# Patient Record
Sex: FEMALE | Race: OTHER | Employment: PART TIME | URBAN - METROPOLITAN AREA
[De-identification: names, ages, dates, MRNs, and addresses within clinical notes are randomized per-mention and may not be internally consistent; named-entity substitution may affect disease eponyms.]

---

## 2017-01-25 ENCOUNTER — OFFICE VISIT (OUTPATIENT)
Dept: INTERNAL MEDICINE CLINIC | Facility: HOSPITAL | Age: 32
End: 2017-01-25
Attending: EMERGENCY MEDICINE

## 2017-01-25 DIAGNOSIS — J11.1 INFLUENZA: Primary | ICD-10-CM

## 2017-01-25 LAB
FLUAV + FLUBV RNA SPEC NAA+PROBE: NEGATIVE
FLUAV + FLUBV RNA SPEC NAA+PROBE: NEGATIVE
FLUAV + FLUBV RNA SPEC NAA+PROBE: POSITIVE

## 2017-01-25 PROCEDURE — 87631 RESP VIRUS 3-5 TARGETS: CPT

## 2017-11-27 ENCOUNTER — OFFICE VISIT (OUTPATIENT)
Dept: INTERNAL MEDICINE CLINIC | Facility: CLINIC | Age: 32
End: 2017-11-27

## 2017-11-27 ENCOUNTER — TELEPHONE (OUTPATIENT)
Dept: OTHER | Age: 32
End: 2017-11-27

## 2017-11-27 VITALS
HEART RATE: 81 BPM | TEMPERATURE: 98 F | SYSTOLIC BLOOD PRESSURE: 134 MMHG | DIASTOLIC BLOOD PRESSURE: 88 MMHG | HEIGHT: 62 IN | BODY MASS INDEX: 37.73 KG/M2 | WEIGHT: 205 LBS

## 2017-11-27 DIAGNOSIS — Z00.00 ANNUAL PHYSICAL EXAM: Primary | ICD-10-CM

## 2017-11-27 PROCEDURE — 99385 PREV VISIT NEW AGE 18-39: CPT | Performed by: INTERNAL MEDICINE

## 2017-11-27 RX ORDER — MONTELUKAST SODIUM 4 MG/1
4 TABLET, CHEWABLE ORAL DAILY
Qty: 90 TABLET | Refills: 3 | Status: SHIPPED | OUTPATIENT
Start: 2017-11-27 | End: 2017-11-27 | Stop reason: DRUGHIGH

## 2017-11-27 RX ORDER — MONTELUKAST SODIUM 10 MG/1
10 TABLET ORAL DAILY
Qty: 90 TABLET | Refills: 1 | Status: SHIPPED | OUTPATIENT
Start: 2017-11-27 | End: 2018-05-26

## 2017-11-27 NOTE — TELEPHONE ENCOUNTER
Pharmacy called to verify the dose of the Montelukast Sodium 4 MG as the usual adult dose is 10 mg. Please advise.

## 2017-11-27 NOTE — PROGRESS NOTES
Georgina Grant is a 29year old female. Patient presents with:  Physical      HPI:   Here for physical.  Doing well, no complaints. Exercised 2 times week. Tries to eat healthy, but has room for improvement. She is on BCP.     Family history -2 maternal lesions. HEENT: AT/NC, ear canal clear bilaterally, tympanic membrane clear bilaterally, nasal mucosa normal, posterior pharynx normal.  Tonsils not enlarged. No exudates.   NECK: supple, no thyromegaly, no thyroid nodules, No palpable lymphadenopathy, no

## 2017-11-28 ENCOUNTER — LAB ENCOUNTER (OUTPATIENT)
Dept: LAB | Facility: HOSPITAL | Age: 32
End: 2017-11-28
Attending: INTERNAL MEDICINE
Payer: COMMERCIAL

## 2017-11-28 DIAGNOSIS — Z00.00 ANNUAL PHYSICAL EXAM: ICD-10-CM

## 2017-11-28 PROCEDURE — 84443 ASSAY THYROID STIM HORMONE: CPT

## 2017-11-28 PROCEDURE — 36415 COLL VENOUS BLD VENIPUNCTURE: CPT

## 2017-11-28 PROCEDURE — 81015 MICROSCOPIC EXAM OF URINE: CPT

## 2017-11-28 PROCEDURE — 80061 LIPID PANEL: CPT

## 2017-11-28 PROCEDURE — 80053 COMPREHEN METABOLIC PANEL: CPT

## 2017-11-28 PROCEDURE — 85025 COMPLETE CBC W/AUTO DIFF WBC: CPT

## 2017-11-29 ENCOUNTER — TELEPHONE (OUTPATIENT)
Dept: INTERNAL MEDICINE CLINIC | Facility: CLINIC | Age: 32
End: 2017-11-29

## 2017-11-29 RX ORDER — SULFAMETHOXAZOLE AND TRIMETHOPRIM 800; 160 MG/1; MG/1
1 TABLET ORAL 2 TIMES DAILY
Qty: 3 TABLET | Refills: 0 | Status: SHIPPED | OUTPATIENT
Start: 2017-11-29 | End: 2017-12-02

## 2017-11-30 NOTE — TELEPHONE ENCOUNTER
Order for Bactrim sent to the pharmacy. UA was abnormal with bacteriuria. please inform the patient to stay hydrated and complete the course of antibiotics.

## 2017-12-27 ENCOUNTER — TELEPHONE (OUTPATIENT)
Dept: INTERNAL MEDICINE CLINIC | Facility: CLINIC | Age: 32
End: 2017-12-27

## 2017-12-27 DIAGNOSIS — N30.00 ACUTE CYSTITIS WITHOUT HEMATURIA: Primary | ICD-10-CM

## 2017-12-27 NOTE — TELEPHONE ENCOUNTER
Pt was sent antibiotics to pharmacy and still has same symptoms and also has line of bruising down right thigh

## 2017-12-28 NOTE — TELEPHONE ENCOUNTER
Spoke with patient (verified name and ), reviewed information, patient verbalized understanding and agrees with plan. Pt states she is still having frequency, urgency, cloudy, malodorous urine, nausea, very mild dysuria. She denies hematuria.  She took

## 2017-12-28 NOTE — TELEPHONE ENCOUNTER
Attempted to call the patient, no answer. Order for u/a and culture signed. Will treat for uti based on the results. It is difficult to treat the rash on the thigh to be evaluated via telephone, needs OV. Please inform th patient.

## 2017-12-28 NOTE — TELEPHONE ENCOUNTER
Advised patient on Dr. Ezra Chou information and recommendations. Patient verbalized understanding and will get the urinalysis/culture done. 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  Patient asked if Dr. Selam Galloway could see her bennie

## 2017-12-29 ENCOUNTER — LAB ENCOUNTER (OUTPATIENT)
Dept: LAB | Facility: HOSPITAL | Age: 32
End: 2017-12-29
Attending: INTERNAL MEDICINE
Payer: COMMERCIAL

## 2017-12-29 DIAGNOSIS — N30.00 ACUTE CYSTITIS WITHOUT HEMATURIA: ICD-10-CM

## 2017-12-29 PROCEDURE — 85025 COMPLETE CBC W/AUTO DIFF WBC: CPT

## 2017-12-29 PROCEDURE — 36415 COLL VENOUS BLD VENIPUNCTURE: CPT

## 2017-12-29 PROCEDURE — 87086 URINE CULTURE/COLONY COUNT: CPT

## 2017-12-29 PROCEDURE — 81003 URINALYSIS AUTO W/O SCOPE: CPT

## 2017-12-29 NOTE — TELEPHONE ENCOUNTER
Informed the patient to follow-up with me next week the earliest possible date to assess the bruises. Also ordering CBC with platelets to evaluate for possible thrombocytopenia.

## 2018-11-21 ENCOUNTER — LAB ENCOUNTER (OUTPATIENT)
Dept: LAB | Facility: HOSPITAL | Age: 33
End: 2018-11-21
Attending: INTERNAL MEDICINE
Payer: COMMERCIAL

## 2018-11-21 ENCOUNTER — OFFICE VISIT (OUTPATIENT)
Dept: INTERNAL MEDICINE CLINIC | Facility: CLINIC | Age: 33
End: 2018-11-21
Payer: COMMERCIAL

## 2018-11-21 VITALS
DIASTOLIC BLOOD PRESSURE: 86 MMHG | TEMPERATURE: 99 F | HEART RATE: 93 BPM | SYSTOLIC BLOOD PRESSURE: 121 MMHG | HEIGHT: 62 IN | BODY MASS INDEX: 37.69 KG/M2 | WEIGHT: 204.81 LBS

## 2018-11-21 DIAGNOSIS — Z00.00 ANNUAL PHYSICAL EXAM: Primary | ICD-10-CM

## 2018-11-21 DIAGNOSIS — Z00.00 ANNUAL PHYSICAL EXAM: ICD-10-CM

## 2018-11-21 PROCEDURE — 36415 COLL VENOUS BLD VENIPUNCTURE: CPT

## 2018-11-21 PROCEDURE — 99395 PREV VISIT EST AGE 18-39: CPT | Performed by: INTERNAL MEDICINE

## 2018-11-21 PROCEDURE — 85025 COMPLETE CBC W/AUTO DIFF WBC: CPT

## 2018-11-21 PROCEDURE — 84443 ASSAY THYROID STIM HORMONE: CPT

## 2018-11-21 PROCEDURE — 80053 COMPREHEN METABOLIC PANEL: CPT

## 2018-11-21 PROCEDURE — 80061 LIPID PANEL: CPT

## 2018-11-21 RX ORDER — TRIAMCINOLONE ACETONIDE OINTMENT USP, 0.05% 0.5 MG/G
1 OINTMENT TOPICAL 2 TIMES DAILY
Qty: 17 G | Refills: 0 | Status: SHIPPED | OUTPATIENT
Start: 2018-11-21 | End: 2018-11-28

## 2018-11-21 NOTE — PROGRESS NOTES
Benji Mello is a 34year old female. Patient presents with:  Physical      HPI:     HPI  Patient is here for physical.  Doing well  Requests for refill on triamcinolone cream for eczema.  Usually clears within 2-3 days of steroid use.       Family hist (BP Location: Right arm, Patient Position: Sitting, Cuff Size: adult)   Pulse 93   Temp 98.6 °F (37 °C) (Oral)   Ht 5' 2\" (1.575 m)   Wt 204 lb 12.8 oz (92.9 kg)   BMI 37.46 kg/m²   Physical Exam   Constitutional: She is oriented to person, place, and ina Future    Other orders  -     Triamcinolone Acetonide 0.05 % External Ointment; Apply 1 Application topically 2 (two) times daily for 7 days.     Had flu shot this year  Recommended jean marie D3 supplements 1000IU atleast 3-4 times a week in winter months    Tda

## 2019-01-03 ENCOUNTER — OFFICE VISIT (OUTPATIENT)
Dept: INTERNAL MEDICINE CLINIC | Facility: CLINIC | Age: 34
End: 2019-01-03
Payer: COMMERCIAL

## 2019-01-03 VITALS
DIASTOLIC BLOOD PRESSURE: 83 MMHG | WEIGHT: 206.88 LBS | BODY MASS INDEX: 38.07 KG/M2 | SYSTOLIC BLOOD PRESSURE: 123 MMHG | HEART RATE: 80 BPM | TEMPERATURE: 98 F | HEIGHT: 62 IN

## 2019-01-03 DIAGNOSIS — J03.90 ACUTE TONSILLITIS, UNSPECIFIED ETIOLOGY: Primary | ICD-10-CM

## 2019-01-03 PROCEDURE — 99213 OFFICE O/P EST LOW 20 MIN: CPT | Performed by: INTERNAL MEDICINE

## 2019-01-03 PROCEDURE — 99212 OFFICE O/P EST SF 10 MIN: CPT | Performed by: INTERNAL MEDICINE

## 2019-01-03 RX ORDER — AMOXICILLIN 875 MG/1
875 TABLET, COATED ORAL 2 TIMES DAILY
Qty: 20 TABLET | Refills: 0 | Status: SHIPPED | OUTPATIENT
Start: 2019-01-03 | End: 2019-01-13

## 2019-01-03 NOTE — PROGRESS NOTES
Lakeisha Aguirre is a 34year old female. Patient presents with:  Sore Throat  Ear Pain      HPI:     HPI  Patient here with complaints of sore throat, ear pain, body aches and fever for 7 days. Has difficulty swallowing and the throat is sore.   History o does not have insomnia.           EXAM:   /83 (BP Location: Right arm, Patient Position: Sitting, Cuff Size: large)   Pulse 80   Temp 98.2 °F (36.8 °C) (Oral)   Ht 5' 2\" (1.575 m)   Wt 206 lb 14.4 oz (93.8 kg)   BMI 37.84 kg/m²   Physical Exam   Cons and agrees to the plan.               Stefano Buenrostro MD  1/3/2019  3:11 PM

## 2019-01-09 ENCOUNTER — PATIENT MESSAGE (OUTPATIENT)
Dept: INTERNAL MEDICINE CLINIC | Facility: CLINIC | Age: 34
End: 2019-01-09

## 2019-01-09 NOTE — TELEPHONE ENCOUNTER
From: Christina Hdez  To: Evelyn Parks MD  Sent: 1/9/2019 12:56 PM CST  Subject: Visit Follow-up Question    Hi Dr. Celia Sethi,    Today is day 7 for the Amoxicillin that I’ve been taking, and my tonsils continue to be very swollen and painful (which is also

## 2019-01-10 ENCOUNTER — OFFICE VISIT (OUTPATIENT)
Dept: INTERNAL MEDICINE CLINIC | Facility: CLINIC | Age: 34
End: 2019-01-10
Payer: COMMERCIAL

## 2019-01-10 ENCOUNTER — APPOINTMENT (OUTPATIENT)
Dept: LAB | Facility: HOSPITAL | Age: 34
End: 2019-01-10
Attending: INTERNAL MEDICINE
Payer: COMMERCIAL

## 2019-01-10 VITALS
HEART RATE: 67 BPM | BODY MASS INDEX: 19.54 KG/M2 | TEMPERATURE: 99 F | DIASTOLIC BLOOD PRESSURE: 84 MMHG | HEIGHT: 62 IN | SYSTOLIC BLOOD PRESSURE: 125 MMHG | WEIGHT: 106.19 LBS

## 2019-01-10 DIAGNOSIS — J02.9 PHARYNGITIS, UNSPECIFIED ETIOLOGY: Primary | ICD-10-CM

## 2019-01-10 DIAGNOSIS — J02.9 PHARYNGITIS, UNSPECIFIED ETIOLOGY: ICD-10-CM

## 2019-01-10 PROCEDURE — 99213 OFFICE O/P EST LOW 20 MIN: CPT | Performed by: INTERNAL MEDICINE

## 2019-01-10 PROCEDURE — 86308 HETEROPHILE ANTIBODY SCREEN: CPT

## 2019-01-10 PROCEDURE — 99212 OFFICE O/P EST SF 10 MIN: CPT | Performed by: INTERNAL MEDICINE

## 2019-01-10 PROCEDURE — 36415 COLL VENOUS BLD VENIPUNCTURE: CPT

## 2019-01-10 NOTE — PROGRESS NOTES
Brianna Land is a 34year old female. Patient presents with: Tonsillitis: f/u  Earache      HPI:     HPI  Patient is here with the complaints of persistent sore throat despite of 8 days of amoxicillin. She was last seen on 1/3/2019 for pharyngitis. myalgias. Negative for joint pain. Skin: Negative for rash. Neurological: Negative for dizziness and headaches. Endo/Heme/Allergies: Negative for environmental allergies. Psychiatric/Behavioral: The patient does not have insomnia.           EXAM: day.  Over-the-counter Tylenol/Advil for pain. Patient to contact me next 3-4 days if the pain and symptoms persist, will consider steroids. The patient indicates understanding of these issues and agrees to the plan.               Saintclair Fleeting, MD  1

## 2019-01-11 LAB — HETEROPH AB SER QL: NEGATIVE

## 2019-01-24 ENCOUNTER — OFFICE VISIT (OUTPATIENT)
Dept: INTERNAL MEDICINE CLINIC | Facility: CLINIC | Age: 34
End: 2019-01-24
Payer: COMMERCIAL

## 2019-01-24 ENCOUNTER — NURSE TRIAGE (OUTPATIENT)
Dept: INTERNAL MEDICINE CLINIC | Facility: CLINIC | Age: 34
End: 2019-01-24

## 2019-01-24 VITALS
OXYGEN SATURATION: 98 % | BODY MASS INDEX: 37.56 KG/M2 | HEART RATE: 84 BPM | WEIGHT: 204.13 LBS | DIASTOLIC BLOOD PRESSURE: 85 MMHG | HEIGHT: 62 IN | SYSTOLIC BLOOD PRESSURE: 138 MMHG | TEMPERATURE: 98 F

## 2019-01-24 DIAGNOSIS — J40 BRONCHITIS: Primary | ICD-10-CM

## 2019-01-24 DIAGNOSIS — R09.81 SINUS CONGESTION: ICD-10-CM

## 2019-01-24 PROCEDURE — 99213 OFFICE O/P EST LOW 20 MIN: CPT | Performed by: INTERNAL MEDICINE

## 2019-01-24 PROCEDURE — 99212 OFFICE O/P EST SF 10 MIN: CPT | Performed by: INTERNAL MEDICINE

## 2019-01-24 RX ORDER — METHYLPREDNISOLONE 4 MG/1
TABLET ORAL
Qty: 1 KIT | Refills: 0 | Status: SHIPPED | OUTPATIENT
Start: 2019-01-24 | End: 2019-12-02 | Stop reason: ALTCHOICE

## 2019-01-24 RX ORDER — FLUTICASONE PROPIONATE 50 MCG
2 SPRAY, SUSPENSION (ML) NASAL DAILY
Qty: 1 BOTTLE | Refills: 0 | Status: SHIPPED | OUTPATIENT
Start: 2019-01-24 | End: 2019-02-20

## 2019-01-24 RX ORDER — ALBUTEROL SULFATE 90 UG/1
2 AEROSOL, METERED RESPIRATORY (INHALATION) EVERY 6 HOURS PRN
Qty: 1 INHALER | Refills: 2 | Status: SHIPPED | OUTPATIENT
Start: 2019-01-24 | End: 2021-11-16

## 2019-01-24 NOTE — TELEPHONE ENCOUNTER
Action Requested: Summary for Provider     []  Critical Lab, Recommendations Needed  [] Need Additional Advice  [x]   FYI    []   Need Orders  [] Need Medications Sent to Pharmacy  []  Other     SUMMARY: Three weeks ago was treated with amoxicillin for \"s

## 2019-01-24 NOTE — PROGRESS NOTES
Adelina Denney is a 34year old female. Patient presents with:  Shortness Of Breath  Tonsillitis: f/u       HPI:     HPI  Patient is here with complaints of shortness of breath for last 3-4 days and for tonsillitis follow-up.   She was prescribed amoxicil Never Smoker      Smokeless tobacco: Never Used    Alcohol use: Yes    Drug use: Not on file       REVIEW OF SYSTEMS:   Review of Systems   Constitutional: Positive for malaise/fatigue. Negative for chills, fever and weight loss.    HENT: Positive for conge Neck: Neck supple. Cardiovascular: Normal rate, regular rhythm and normal heart sounds. Pulmonary/Chest: Effort normal and breath sounds normal. She has no wheezes. Abdominal: Soft.  Bowel sounds are normal.   Neurological: She is alert and oriented Each Nare route daily.  -     Spacer/Aero Chamber Mouthpiece Does not apply Misc; Use for inhalation  -     triamcinolone acetonide 0.1 % External Cream; Apply topically 2 (two) times daily as needed. Cream.  Do not use for extended periods of time.

## 2019-01-24 NOTE — TELEPHONE ENCOUNTER
Pt scheduled appt through [address] with following sx:    Visit Type: MYCHART EXAM (2964)      1/24/2019   11:10 AM  20 mins. Roberto Carlos Valdivia MD          [facility]      Patient Comments:   shortness of breath     Please advise.

## 2019-02-03 ENCOUNTER — PATIENT MESSAGE (OUTPATIENT)
Dept: INTERNAL MEDICINE CLINIC | Facility: CLINIC | Age: 34
End: 2019-02-03

## 2019-02-04 ENCOUNTER — TELEPHONE (OUTPATIENT)
Dept: OTHER | Age: 34
End: 2019-02-04

## 2019-02-04 RX ORDER — VALACYCLOVIR HYDROCHLORIDE 1 G/1
TABLET, FILM COATED ORAL
Qty: 10 TABLET | Refills: 0 | Status: SHIPPED | OUTPATIENT
Start: 2019-02-04 | End: 2019-12-02

## 2019-02-04 NOTE — TELEPHONE ENCOUNTER
From: Rory Gutierrez  To: Princess Daniela MD  Sent: 2/3/2019 2:49 PM CST  Subject: Visit Follow-up Question    Dr. Little Brightly- I feel a lot better since finishing the steroids.  I however tend to get terrible cold sores after colds, and randomly at least once a m

## 2019-02-04 NOTE — TELEPHONE ENCOUNTER
[facility]  [facility]    Regarding: Visit Follow-up Question  Contact: [phone]  ----- Message from Mychart Generic sent at 2/3/2019  2:49 PM CST -----    Dr. Mily Smith- I feel a lot better since finishing the steroids.  I however tend to get terrible c

## 2019-02-20 DIAGNOSIS — Z20.822 SUSPECTED COVID-19 VIRUS INFECTION: Primary | ICD-10-CM

## 2019-02-20 RX ORDER — FLUTICASONE PROPIONATE 50 MCG
SPRAY, SUSPENSION (ML) NASAL
Qty: 3 BOTTLE | Refills: 0 | Status: SHIPPED | OUTPATIENT
Start: 2019-02-20 | End: 2019-12-02 | Stop reason: ALTCHOICE

## 2019-08-05 ENCOUNTER — OFFICE VISIT (OUTPATIENT)
Dept: OBGYN CLINIC | Facility: CLINIC | Age: 34
End: 2019-08-05
Payer: COMMERCIAL

## 2019-08-05 VITALS
WEIGHT: 208 LBS | DIASTOLIC BLOOD PRESSURE: 89 MMHG | HEART RATE: 76 BPM | BODY MASS INDEX: 38 KG/M2 | SYSTOLIC BLOOD PRESSURE: 129 MMHG

## 2019-08-05 DIAGNOSIS — Z30.432 ENCOUNTER FOR REMOVAL OF INTRAUTERINE CONTRACEPTIVE DEVICE: ICD-10-CM

## 2019-08-05 DIAGNOSIS — Z11.3 SCREENING FOR STD (SEXUALLY TRANSMITTED DISEASE): ICD-10-CM

## 2019-08-05 DIAGNOSIS — Z20.2 STD EXPOSURE: ICD-10-CM

## 2019-08-05 DIAGNOSIS — Z01.419 WELL WOMAN EXAM WITH ROUTINE GYNECOLOGICAL EXAM: Primary | ICD-10-CM

## 2019-08-05 DIAGNOSIS — Z12.4 CERVICAL CANCER SCREENING: ICD-10-CM

## 2019-08-05 DIAGNOSIS — Z30.432 ENCOUNTER FOR IUD REMOVAL: ICD-10-CM

## 2019-08-05 LAB
CONTROL LINE PRESENT WITH A CLEAR BACKGROUND (YES/NO): YES YES/NO
KIT LOT #: NORMAL NUMERIC

## 2019-08-05 PROCEDURE — 81025 URINE PREGNANCY TEST: CPT | Performed by: CLINICAL NURSE SPECIALIST

## 2019-08-05 PROCEDURE — 99385 PREV VISIT NEW AGE 18-39: CPT | Performed by: CLINICAL NURSE SPECIALIST

## 2019-08-05 PROCEDURE — 58301 REMOVE INTRAUTERINE DEVICE: CPT | Performed by: CLINICAL NURSE SPECIALIST

## 2019-08-05 NOTE — PROGRESS NOTES
Lauryn Manrique is a 34year old female  No LMP recorded. (Menstrual status: IUD - Intrauterine Device). Patient presents with:  Gyn Exam: NP, Annual, possible IUD removal pt has Jannie  New pt. Last annual exam and pap was 2017.  Pap was normal. Rafael Naranjo Days per week: Not on file        Minutes per session: Not on file      Stress: Not on file    Relationships      Social connections:        Talks on phone: Not on file        Gets together: Not on file        Attends [employer] witness service: Not on file dysuria, incontinence, abnormal vaginal discharge, vaginal itching  Musculoskeletal:  denies back pain. Skin/Breast:  Denies any breast pain, lumps, or discharge. Neurological:  denies headaches, extremity weakness or numbness.   Psychiatric: denies depr ONLY    Screening for STD (sexually transmitted disease)  -     CHLAMYDIA/GONOCOCCUS, MODE; Future  -     TRICH VAG BY MODE;  Future  -     TRICH VAG BY MODE  -     CHLAMYDIA/GONOCOCCUS, MODE    Cervical cancer screening  -     THINPREP PAP SMEAR B; Future  -

## 2019-08-06 LAB
C TRACH DNA SPEC QL NAA+PROBE: NEGATIVE
HPV I/H RISK 1 DNA SPEC QL NAA+PROBE: NEGATIVE
N GONORRHOEA DNA SPEC QL NAA+PROBE: NEGATIVE

## 2019-08-07 LAB — T VAGINALIS RRNA SPEC QL NAA+PROBE: POSITIVE

## 2019-08-08 ENCOUNTER — APPOINTMENT (OUTPATIENT)
Dept: LAB | Facility: HOSPITAL | Age: 34
End: 2019-08-08
Attending: CLINICAL NURSE SPECIALIST
Payer: COMMERCIAL

## 2019-08-08 DIAGNOSIS — Z20.2 EXPOSURE TO STD: Primary | ICD-10-CM

## 2019-08-08 PROCEDURE — 87808 TRICHOMONAS ASSAY W/OPTIC: CPT

## 2019-08-08 PROCEDURE — 87205 SMEAR GRAM STAIN: CPT

## 2019-08-08 PROCEDURE — 87591 N.GONORRHOEAE DNA AMP PROB: CPT

## 2019-08-08 PROCEDURE — 87491 CHLMYD TRACH DNA AMP PROBE: CPT

## 2019-08-08 PROCEDURE — 87106 FUNGI IDENTIFICATION YEAST: CPT

## 2019-08-08 NOTE — PROGRESS NOTES
Spoke with pt regarding pap with + BV and + trich. Pt is shocked by this. Repeat culture done today to confirm results.        MAF

## 2019-08-08 NOTE — PROCEDURES
IUD Removal     Pregnancy Results: negative from urine test   Birth control method(s) used:  Jannie IUD   Consent signed. Procedure discussed with the patient in detail including indication, risks, benefits, alternatives and complications.     Pelvic Exam F

## 2019-08-09 ENCOUNTER — TELEPHONE (OUTPATIENT)
Dept: OBGYN CLINIC | Facility: CLINIC | Age: 34
End: 2019-08-09

## 2019-08-09 LAB
C TRACH DNA SPEC QL NAA+PROBE: NEGATIVE
N GONORRHOEA DNA SPEC QL NAA+PROBE: NEGATIVE

## 2019-08-09 RX ORDER — METRONIDAZOLE 500 MG/1
500 TABLET ORAL 2 TIMES DAILY
Qty: 14 TABLET | Refills: 0 | Status: SHIPPED | OUTPATIENT
Start: 2019-08-09 | End: 2019-08-16

## 2019-08-09 NOTE — TELEPHONE ENCOUNTER
Spoke with pt on telephone and aware repeat vaginal culture is negative for Trich and + for BV, so the + trich from the pap must have been a false +. Will send in Rx for Flagyl 500 mg po BID x 7 days. Pt denies any questions at this time.     MAF

## 2019-08-10 LAB
GENITAL VAGINOSIS SCREEN: POSITIVE
TRICHOMONAS SCREEN: NEGATIVE

## 2019-08-29 ENCOUNTER — TELEPHONE (OUTPATIENT)
Dept: OBGYN CLINIC | Facility: CLINIC | Age: 34
End: 2019-08-29

## 2019-08-29 NOTE — TELEPHONE ENCOUNTER
Pt was treated for BV by [facility]. She was given rx for Metronidazole and did not take the last three pills. She stated that she left it in a hotel.   She is still having symptoms of vag discharge that is a yellowish brown thick discharge, vaginal itching and

## 2019-09-09 RX ORDER — METRONIDAZOLE 7.5 MG/G
1 GEL VAGINAL NIGHTLY
Qty: 1 TUBE | Refills: 0 | Status: SHIPPED | OUTPATIENT
Start: 2019-09-09 | End: 2019-09-14

## 2019-09-09 NOTE — TELEPHONE ENCOUNTER
Pt was advised Rx Megtrogel Vaginal Gel will be sent to her pharmacy. If pt does not have relief she will need to be seen in the office for repeat cultures. Pt agrees with plan. Rx sent to pharmacy.

## 2019-12-02 ENCOUNTER — LAB ENCOUNTER (OUTPATIENT)
Dept: LAB | Age: 34
End: 2019-12-02
Attending: FAMILY MEDICINE
Payer: COMMERCIAL

## 2019-12-02 ENCOUNTER — OFFICE VISIT (OUTPATIENT)
Dept: FAMILY MEDICINE CLINIC | Facility: CLINIC | Age: 34
End: 2019-12-02
Payer: COMMERCIAL

## 2019-12-02 VITALS
SYSTOLIC BLOOD PRESSURE: 128 MMHG | BODY MASS INDEX: 37.81 KG/M2 | TEMPERATURE: 98 F | RESPIRATION RATE: 18 BRPM | HEART RATE: 82 BPM | WEIGHT: 213.38 LBS | DIASTOLIC BLOOD PRESSURE: 86 MMHG | HEIGHT: 63 IN

## 2019-12-02 DIAGNOSIS — Z00.00 ANNUAL PHYSICAL EXAM: Primary | ICD-10-CM

## 2019-12-02 DIAGNOSIS — Z00.00 ANNUAL PHYSICAL EXAM: ICD-10-CM

## 2019-12-02 PROBLEM — J40 BRONCHITIS: Status: RESOLVED | Noted: 2019-01-24 | Resolved: 2019-12-02

## 2019-12-02 PROCEDURE — 90471 IMMUNIZATION ADMIN: CPT | Performed by: FAMILY MEDICINE

## 2019-12-02 PROCEDURE — 90715 TDAP VACCINE 7 YRS/> IM: CPT | Performed by: FAMILY MEDICINE

## 2019-12-02 PROCEDURE — 84443 ASSAY THYROID STIM HORMONE: CPT

## 2019-12-02 PROCEDURE — 85025 COMPLETE CBC W/AUTO DIFF WBC: CPT

## 2019-12-02 PROCEDURE — 99385 PREV VISIT NEW AGE 18-39: CPT | Performed by: FAMILY MEDICINE

## 2019-12-02 PROCEDURE — 80053 COMPREHEN METABOLIC PANEL: CPT

## 2019-12-02 PROCEDURE — 80061 LIPID PANEL: CPT

## 2019-12-02 PROCEDURE — 36415 COLL VENOUS BLD VENIPUNCTURE: CPT

## 2019-12-02 RX ORDER — VALACYCLOVIR HYDROCHLORIDE 1 G/1
TABLET, FILM COATED ORAL
Qty: 10 TABLET | Refills: 3 | Status: SHIPPED | OUTPATIENT
Start: 2019-12-02 | End: 2021-05-24

## 2019-12-03 NOTE — PROGRESS NOTES
HPI:    Kristyn Tomlinson is a 30year old female presents to clinic as a new patient to establish care and for physical exam.  Denies acute concerns. Patient denies chronic medical conditions, takes no over-the-counter/prescription medications.   Normal a membrane, external ear and ear canal normal.   Nose: Nose normal.   Mouth/Throat: Uvula is midline, oropharynx is clear and moist and mucous membranes are normal.   Eyes: Pupils are equal, round, and reactive to light.  Conjunctivae and EOM are normal.   Ne

## 2019-12-06 ENCOUNTER — TELEPHONE (OUTPATIENT)
Dept: FAMILY MEDICINE CLINIC | Facility: CLINIC | Age: 34
End: 2019-12-06

## 2020-01-13 ENCOUNTER — OFFICE VISIT (OUTPATIENT)
Dept: FAMILY MEDICINE CLINIC | Facility: CLINIC | Age: 35
End: 2020-01-13
Payer: COMMERCIAL

## 2020-01-13 VITALS
HEIGHT: 63 IN | WEIGHT: 209 LBS | RESPIRATION RATE: 18 BRPM | TEMPERATURE: 98 F | HEART RATE: 108 BPM | SYSTOLIC BLOOD PRESSURE: 137 MMHG | DIASTOLIC BLOOD PRESSURE: 89 MMHG | BODY MASS INDEX: 37.03 KG/M2

## 2020-01-13 DIAGNOSIS — J02.0 STREP PHARYNGITIS: Primary | ICD-10-CM

## 2020-01-13 LAB
CONTROL LINE PRESENT WITH A CLEAR BACKGROUND (YES/NO): YES YES/NO
KIT LOT #: NORMAL NUMERIC

## 2020-01-13 PROCEDURE — 99213 OFFICE O/P EST LOW 20 MIN: CPT | Performed by: FAMILY MEDICINE

## 2020-01-13 PROCEDURE — 87880 STREP A ASSAY W/OPTIC: CPT | Performed by: FAMILY MEDICINE

## 2020-01-13 RX ORDER — PENICILLIN V POTASSIUM 500 MG/1
500 TABLET ORAL 3 TIMES DAILY
Qty: 30 TABLET | Refills: 0 | Status: SHIPPED | OUTPATIENT
Start: 2020-01-13 | End: 2020-01-23

## 2020-01-13 NOTE — PROGRESS NOTES
HPI:    Ulysses Perking is a 30year old female presents to clinic with a 2-day history of sore throat. Patient has been congested for the past week. Reports occasional headaches and some fatigue. Had some chills last week that resolved.   Denies fever Posterior oropharyngeal edema and posterior oropharyngeal erythema present. Eyes: Pupils are equal, round, and reactive to light. Conjunctivae and EOM are normal.   Neck: Normal range of motion. Neck supple. No thyromegaly present.    Cardiovascular: Norm

## 2020-04-10 ENCOUNTER — NURSE TRIAGE (OUTPATIENT)
Dept: OTHER | Age: 35
End: 2020-04-10

## 2020-04-10 RX ORDER — POLYMYXIN B SULFATE AND TRIMETHOPRIM 1; 10000 MG/ML; [USP'U]/ML
1 SOLUTION OPHTHALMIC EVERY 4 HOURS
Qty: 1 BOTTLE | Refills: 0 | Status: SHIPPED | OUTPATIENT
Start: 2020-04-10 | End: 2020-04-17

## 2020-04-10 NOTE — TELEPHONE ENCOUNTER
Called patient and reviewed provider's recommendations from below. Also reviewed medication directions with patient. She verbalized understanding.

## 2020-04-10 NOTE — TELEPHONE ENCOUNTER
Action Requested: Summary for Provider     []  Critical Lab, Recommendations Needed  [x] Need Additional Advice  []   FYI    []   Need Orders  [] Need Medications Sent to Pharmacy  []  Other     SUMMARY: Patient calling with complaint of right eye itching,

## 2020-04-10 NOTE — TELEPHONE ENCOUNTER
Polytrim drops to pharmacy. Please instruct patient to throw out all of her eye make-up that she is used in the past 48 hours, and frequent handwashing.

## 2020-07-16 DIAGNOSIS — Z78.9 PARTICIPANT IN HEALTH AND WELLNESS PLAN: Primary | ICD-10-CM

## 2020-07-28 ENCOUNTER — NURSE ONLY (OUTPATIENT)
Dept: LAB | Age: 35
End: 2020-07-28
Attending: PREVENTIVE MEDICINE

## 2020-07-28 DIAGNOSIS — Z78.9 PARTICIPANT IN HEALTH AND WELLNESS PLAN: ICD-10-CM

## 2020-07-28 PROCEDURE — 86769 SARS-COV-2 COVID-19 ANTIBODY: CPT

## 2020-07-29 LAB — SARS-COV-2 IGG SERPLBLD QL IA.RAPID: NEGATIVE

## 2020-09-21 ENCOUNTER — OFFICE VISIT (OUTPATIENT)
Dept: OTHER | Facility: HOSPITAL | Age: 35
End: 2020-09-21
Attending: ORTHOPAEDIC SURGERY

## 2020-09-21 ENCOUNTER — HOSPITAL ENCOUNTER (OUTPATIENT)
Dept: GENERAL RADIOLOGY | Facility: HOSPITAL | Age: 35
Discharge: HOME | End: 2020-09-21
Attending: ORTHOPAEDIC SURGERY
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
## 2020-09-21 PROCEDURE — 73130 X-RAY EXAM OF HAND: CPT | Performed by: ORTHOPAEDIC SURGERY

## 2020-09-28 ENCOUNTER — APPOINTMENT (OUTPATIENT)
Dept: OTHER | Facility: HOSPITAL | Age: 35
End: 2020-09-28
Attending: ORTHOPAEDIC SURGERY

## 2020-11-04 ENCOUNTER — TELEPHONE (OUTPATIENT)
Dept: INTERNAL MEDICINE CLINIC | Facility: HOSPITAL | Age: 35
End: 2020-11-04

## 2020-11-04 ENCOUNTER — LAB ENCOUNTER (OUTPATIENT)
Dept: LAB | Age: 35
End: 2020-11-04
Attending: PREVENTIVE MEDICINE
Payer: COMMERCIAL

## 2020-11-04 DIAGNOSIS — Z20.822 SUSPECTED COVID-19 VIRUS INFECTION: ICD-10-CM

## 2020-11-04 DIAGNOSIS — Z20.822 SUSPECTED COVID-19 VIRUS INFECTION: Primary | ICD-10-CM

## 2020-12-02 ENCOUNTER — NURSE ONLY (OUTPATIENT)
Dept: OBGYN CLINIC | Facility: CLINIC | Age: 35
End: 2020-12-02
Payer: COMMERCIAL

## 2020-12-02 DIAGNOSIS — Z34.01 ENCOUNTER FOR SUPERVISION OF NORMAL FIRST PREGNANCY IN FIRST TRIMESTER: Primary | ICD-10-CM

## 2020-12-02 RX ORDER — CHOLECALCIFEROL (VITAMIN D3) 25 MCG
1 TABLET,CHEWABLE ORAL DAILY
COMMUNITY
End: 2021-11-16 | Stop reason: ALTCHOICE

## 2020-12-02 NOTE — PROGRESS NOTES
Pt seen for OBN appt today with no complaints. Normal PN labs, qual, 1 hr gtt, and hep c ordered. Pt advised all labs must be completed and resulted prior to MD appt. NPN appt with MD scheduled on 12/8/2020 with CAP in [address] [place] [address]. Pt desires FTS.     Par No   Neural tube defect (Meningomyelocele, Spina bifida, or Anencephaly): No   Congenital heart defect: No   Down syndrome: Yes (Comment: pt's maternal uncle has Down Syndrome)   Aaron ([address] [place]): No   Canavan disease (A

## 2020-12-05 ENCOUNTER — LAB ENCOUNTER (OUTPATIENT)
Dept: LAB | Facility: HOSPITAL | Age: 35
End: 2020-12-05
Attending: OBSTETRICS & GYNECOLOGY
Payer: COMMERCIAL

## 2020-12-05 DIAGNOSIS — Z34.01 ENCOUNTER FOR SUPERVISION OF NORMAL FIRST PREGNANCY IN FIRST TRIMESTER: ICD-10-CM

## 2020-12-05 PROCEDURE — 86850 RBC ANTIBODY SCREEN: CPT

## 2020-12-05 PROCEDURE — 86780 TREPONEMA PALLIDUM: CPT

## 2020-12-05 PROCEDURE — 84703 CHORIONIC GONADOTROPIN ASSAY: CPT

## 2020-12-05 PROCEDURE — 87389 HIV-1 AG W/HIV-1&-2 AB AG IA: CPT

## 2020-12-05 PROCEDURE — 85025 COMPLETE CBC W/AUTO DIFF WBC: CPT

## 2020-12-05 PROCEDURE — 87340 HEPATITIS B SURFACE AG IA: CPT

## 2020-12-05 PROCEDURE — 86803 HEPATITIS C AB TEST: CPT

## 2020-12-05 PROCEDURE — 86762 RUBELLA ANTIBODY: CPT

## 2020-12-05 PROCEDURE — 86900 BLOOD TYPING SEROLOGIC ABO: CPT

## 2020-12-05 PROCEDURE — 87086 URINE CULTURE/COLONY COUNT: CPT

## 2020-12-05 PROCEDURE — 86901 BLOOD TYPING SEROLOGIC RH(D): CPT

## 2020-12-05 PROCEDURE — 36415 COLL VENOUS BLD VENIPUNCTURE: CPT

## 2020-12-07 ENCOUNTER — LAB ENCOUNTER (OUTPATIENT)
Dept: LAB | Facility: HOSPITAL | Age: 35
End: 2020-12-07
Attending: OBSTETRICS & GYNECOLOGY
Payer: COMMERCIAL

## 2020-12-07 DIAGNOSIS — Z34.01 ENCOUNTER FOR SUPERVISION OF NORMAL FIRST PREGNANCY IN FIRST TRIMESTER: ICD-10-CM

## 2020-12-07 PROCEDURE — 36415 COLL VENOUS BLD VENIPUNCTURE: CPT

## 2020-12-07 PROCEDURE — 82950 GLUCOSE TEST: CPT

## 2020-12-08 ENCOUNTER — HOSPITAL ENCOUNTER (OUTPATIENT)
Dept: ULTRASOUND IMAGING | Age: 35
Discharge: HOME | End: 2020-12-08
Attending: OBSTETRICS & GYNECOLOGY
Payer: COMMERCIAL

## 2020-12-08 ENCOUNTER — INITIAL PRENATAL (OUTPATIENT)
Dept: OBGYN CLINIC | Facility: CLINIC | Age: 35
End: 2020-12-08
Payer: COMMERCIAL

## 2020-12-08 VITALS
DIASTOLIC BLOOD PRESSURE: 84 MMHG | SYSTOLIC BLOOD PRESSURE: 131 MMHG | HEART RATE: 89 BPM | WEIGHT: 221.38 LBS | BODY MASS INDEX: 39 KG/M2

## 2020-12-08 DIAGNOSIS — Z34.91 PREGNANCY WITH UNCERTAIN DATES IN FIRST TRIMESTER: ICD-10-CM

## 2020-12-08 DIAGNOSIS — Z34.01 ENCOUNTER FOR SUPERVISION OF NORMAL FIRST PREGNANCY IN FIRST TRIMESTER: Primary | ICD-10-CM

## 2020-12-08 PROBLEM — N39.0 FREQUENT UTI: Status: ACTIVE | Noted: 2020-12-08

## 2020-12-08 PROCEDURE — 3075F SYST BP GE 130 - 139MM HG: CPT | Performed by: OBSTETRICS & GYNECOLOGY

## 2020-12-08 PROCEDURE — 76817 TRANSVAGINAL US OBSTETRIC: CPT | Performed by: OBSTETRICS & GYNECOLOGY

## 2020-12-08 PROCEDURE — 81002 URINALYSIS NONAUTO W/O SCOPE: CPT | Performed by: OBSTETRICS & GYNECOLOGY

## 2020-12-08 PROCEDURE — 3079F DIAST BP 80-89 MM HG: CPT | Performed by: OBSTETRICS & GYNECOLOGY

## 2020-12-08 NOTE — PROGRESS NOTES
Gest sac not well visualized on office US- sent for TVUS- suspect wrong dates- no menses in October but preg test neg x2 during that month.   Wants FTS, h/o freq UTI- d/w pt q trimester urine cxs, gc/chlamydia done

## 2020-12-09 ENCOUNTER — TELEPHONE (OUTPATIENT)
Dept: OBGYN CLINIC | Facility: CLINIC | Age: 35
End: 2020-12-09

## 2020-12-09 NOTE — TELEPHONE ENCOUNTER
Order placed, mychart sent, [facility] number provided     OK to add Level 2 per CAP and KIRSTY due to BMI

## 2021-01-08 ENCOUNTER — ROUTINE PRENATAL (OUTPATIENT)
Dept: OBGYN CLINIC | Facility: CLINIC | Age: 36
End: 2021-01-08
Payer: COMMERCIAL

## 2021-01-08 VITALS
WEIGHT: 225 LBS | HEART RATE: 92 BPM | DIASTOLIC BLOOD PRESSURE: 86 MMHG | BODY MASS INDEX: 40 KG/M2 | SYSTOLIC BLOOD PRESSURE: 130 MMHG

## 2021-01-08 DIAGNOSIS — Z34.92 ENCOUNTER FOR SUPERVISION OF NORMAL PREGNANCY IN SECOND TRIMESTER, UNSPECIFIED GRAVIDITY: Primary | ICD-10-CM

## 2021-01-08 LAB
APPEARANCE: CLEAR
MULTISTIX LOT#: NORMAL NUMERIC
PH, URINE: 7 (ref 4.5–8)
SPECIFIC GRAVITY: 1.01 (ref 1–1.03)
URINE-COLOR: YELLOW

## 2021-01-08 PROCEDURE — 81002 URINALYSIS NONAUTO W/O SCOPE: CPT | Performed by: OBSTETRICS & GYNECOLOGY

## 2021-01-08 PROCEDURE — 3075F SYST BP GE 130 - 139MM HG: CPT | Performed by: OBSTETRICS & GYNECOLOGY

## 2021-01-08 PROCEDURE — 3079F DIAST BP 80-89 MM HG: CPT | Performed by: OBSTETRICS & GYNECOLOGY

## 2021-01-14 ENCOUNTER — IMMUNIZATION (OUTPATIENT)
Dept: LAB | Facility: HOSPITAL | Age: 36
End: 2021-01-14
Attending: EMERGENCY MEDICINE
Payer: COMMERCIAL

## 2021-01-14 DIAGNOSIS — Z23 NEED FOR VACCINATION: Primary | ICD-10-CM

## 2021-01-14 PROCEDURE — 0011A SARSCOV2 VAC 100MCG/0.5ML IM: CPT

## 2021-01-18 ENCOUNTER — APPOINTMENT (OUTPATIENT)
Dept: ULTRASOUND IMAGING | Facility: HOSPITAL | Age: 36
End: 2021-01-18
Attending: NURSE PRACTITIONER
Payer: COMMERCIAL

## 2021-01-18 ENCOUNTER — TELEPHONE (OUTPATIENT)
Dept: OBGYN CLINIC | Facility: CLINIC | Age: 36
End: 2021-01-18

## 2021-01-18 ENCOUNTER — HOSPITAL ENCOUNTER (EMERGENCY)
Facility: HOSPITAL | Age: 36
Discharge: HOME | End: 2021-01-18
Payer: COMMERCIAL

## 2021-01-18 VITALS
SYSTOLIC BLOOD PRESSURE: 135 MMHG | BODY MASS INDEX: 41.41 KG/M2 | DIASTOLIC BLOOD PRESSURE: 85 MMHG | TEMPERATURE: 97 F | WEIGHT: 225 LBS | RESPIRATION RATE: 20 BRPM | OXYGEN SATURATION: 98 % | HEIGHT: 62 IN | HEART RATE: 72 BPM

## 2021-01-18 DIAGNOSIS — O46.90 VAGINAL BLEEDING DURING PREGNANCY: Primary | ICD-10-CM

## 2021-01-18 LAB
ANTIBODY SCREEN: NEGATIVE
B-HCG SERPL-ACNC: ABNORMAL MIU/ML
BASOPHILS # BLD AUTO: 0.04 X10(3) UL (ref 0–0.2)
BASOPHILS NFR BLD AUTO: 0.4 %
DEPRECATED RDW RBC AUTO: 38.2 FL (ref 35.1–46.3)
EOSINOPHIL # BLD AUTO: 0.13 X10(3) UL (ref 0–0.7)
EOSINOPHIL NFR BLD AUTO: 1.2 %
ERYTHROCYTE [DISTWIDTH] IN BLOOD BY AUTOMATED COUNT: 11.9 % (ref 11–15)
HCT VFR BLD AUTO: 38.9 %
HGB BLD-MCNC: 13.3 G/DL
IMM GRANULOCYTES # BLD AUTO: 0.04 X10(3) UL (ref 0–1)
IMM GRANULOCYTES NFR BLD: 0.4 %
LYMPHOCYTES # BLD AUTO: 2.6 X10(3) UL (ref 1–4)
LYMPHOCYTES NFR BLD AUTO: 23.2 %
MCH RBC QN AUTO: 30 PG (ref 26–34)
MCHC RBC AUTO-ENTMCNC: 34.2 G/DL (ref 31–37)
MCV RBC AUTO: 87.6 FL
MONOCYTES # BLD AUTO: 0.77 X10(3) UL (ref 0.1–1)
MONOCYTES NFR BLD AUTO: 6.9 %
NEUTROPHILS # BLD AUTO: 7.65 X10 (3) UL (ref 1.5–7.7)
NEUTROPHILS # BLD AUTO: 7.65 X10(3) UL (ref 1.5–7.7)
NEUTROPHILS NFR BLD AUTO: 67.9 %
PLATELET # BLD AUTO: 261 10(3)UL (ref 150–450)
RBC # BLD AUTO: 4.44 X10(6)UL
RH BLOOD TYPE: NEGATIVE
WBC # BLD AUTO: 11.2 X10(3) UL (ref 4–11)

## 2021-01-18 PROCEDURE — 85025 COMPLETE CBC W/AUTO DIFF WBC: CPT | Performed by: NURSE PRACTITIONER

## 2021-01-18 PROCEDURE — 96361 HYDRATE IV INFUSION ADD-ON: CPT

## 2021-01-18 PROCEDURE — 99284 EMERGENCY DEPT VISIT MOD MDM: CPT

## 2021-01-18 PROCEDURE — 86901 BLOOD TYPING SEROLOGIC RH(D): CPT | Performed by: NURSE PRACTITIONER

## 2021-01-18 PROCEDURE — 86850 RBC ANTIBODY SCREEN: CPT | Performed by: NURSE PRACTITIONER

## 2021-01-18 PROCEDURE — 76815 OB US LIMITED FETUS(S): CPT | Performed by: NURSE PRACTITIONER

## 2021-01-18 PROCEDURE — 86900 BLOOD TYPING SEROLOGIC ABO: CPT | Performed by: NURSE PRACTITIONER

## 2021-01-18 PROCEDURE — 96374 THER/PROPH/DIAG INJ IV PUSH: CPT

## 2021-01-18 PROCEDURE — 84702 CHORIONIC GONADOTROPIN TEST: CPT | Performed by: NURSE PRACTITIONER

## 2021-01-18 NOTE — TELEPHONE ENCOUNTER
13w0d states was laying down to take a nap and left a \"little gush\" 10 minutes ago. States went to the bathroom and noticed red spotting when wiping x3 times and on toilet bowl. Denies any cramping or pain. States IC last night. Denies any straining.  Pt Please follow up in 5-6 weeks. You are advised to contact us if your condition worsens. Look into a [employer] ankle brace at [email or web address]  Address: [address] [place] [address], [place], [facility] [address]  Phone: [phone]      Broken Lower Leg: Care Instructions  Your Care Instructions    Treatment for your broken leg will depend on how bad the break is. Your doctor may have put your lower leg in a splint or a cast to allow it to heal or keep it stable until you see another doctor. It may take weeks or months for your leg to heal. You can help it heal with some care at home. You heal best when you take good care of yourself. Eat a variety of healthy foods, and don't smoke. Follow-up care is a key part of your treatment and safety. Be sure to make and go to all appointments, and call your doctor if you are having problems. It's also a good idea to know your test results and keep a list of the medicines you take. How can you care for yourself at home? · Put ice or a cold pack on your lower leg for 10 to 20 minutes at a time. Try to do this every 1 to 2 hours for the next 3 days (when you are awake). Put a thin cloth between the ice and your cast or splint. Keep your cast or splint dry. · Follow the cast care instructions your doctor gives you. If you have a splint, do not take it off unless your doctor tells you to. · Be safe with medicines. Take pain medicines exactly as directed. ? If the doctor gave you a prescription medicine for pain, take it as prescribed. ? If you are not taking a prescription pain medicine, ask your doctor if you can take an over-the-counter medicine. · Do not put weight on your leg unless your doctor tells you to. Use crutches to walk. · Prop up your leg on pillows when you sit or lie down in the first few days after the injury. Keep your leg higher than the level of your heart. This will help reduce swelling.   · Follow instructions for exercises to keep your leg strong. · Wiggle your toes often to reduce swelling and stiffness. When should you call for help? Call 911 anytime you think you may need emergency care. For example, call if:    · You have chest pain, are short of breath, or you cough up blood.     · You are very sleepy and you have trouble waking up.    Call your doctor now or seek immediate medical care if:    · You have new or worse nausea or vomiting.     · You have new or worse pain.     · Your foot is cool or pale or changes color.     · You have tingling, weakness, or numbness in your toes.     · Your cast or splint feels too tight.     · You have signs of a blood clot in your leg (called a deep vein thrombosis), such as:  ? Pain in your calf, back of the knee, thigh, or groin. ? Redness or swelling in your leg.    Watch closely for changes in your health, and be sure to contact your doctor if:    · You have a problem with your splint or cast.     · You do not get better as expected. Where can you learn more? Go to http://angela-michelle.info/. Enter L198 in the search box to learn more about \"Broken Lower Leg: Care Instructions. \"  Current as of: September 20, 2018  Content Version: 11.9  © 9410-8559 [employer]. Care instructions adapted under license by [employer] (which disclaims liability or warranty for this information). If you have questions about a medical condition or this instruction, always ask your healthcare professional. [place] [address] any warranty or liability for your use of this information.

## 2021-01-18 NOTE — TELEPHONE ENCOUNTER
Pt now calling to report has experienced 3 episodes of bleeding like a period. States she soaked one pad in one hour. And about 1/2 pad the other two bleeding episodes. Denies any cramping or pain. Denies passing any clots. Denies SOB, or dizziness.  States

## 2021-01-19 NOTE — ED NOTES
Patient safe to DC home per NP. Able to dress self. DC teaching done, instructions reviewed with patient including when and how to follow up with healthcare providers and when to seek emergency care. The patient verbalizes understanding.   Patient ambulator

## 2021-01-19 NOTE — ED PROVIDER NOTES
Patient Seen in: [facility] AND [facility] Emergency Department      History   Patient presents with:  Pregnancy Issues    Stated Complaint: 15 w preg vag bleed    HPI/Subjective:   42-year-old female who is approximately 13 weeks gestation presenting with vagin (Within Days)   SpO2 98%   BMI 41.15 kg/m²         Physical Exam  Vitals signs and nursing note reviewed. Exam conducted with a chaperone present. Constitutional:       Appearance: Normal appearance. HENT:      Head: Normocephalic.       Right Ear: Exte Abnormality         Status                     ---------                               -----------         ------                     CBC W/ DIFFERENTIAL[[id number]]          Abnormal            Final result                 Please view res weeks, 1 day ñ 6 days 07/25/2021          CONCLUSION:  1. Single live intrauterine pregnancy with sonographic gestational age of 17 weeks, 0 days and sonographic estimated date of delivery of 07/26/2021 (established by the 12/08/2020 examination).  Measurem

## 2021-01-19 NOTE — ED INITIAL ASSESSMENT (HPI)
Pt , approximately 13 weeks pregnant. Pt c/o pink vaginal bleeding that is darkening into deep red vaginal bleeding. Denies abd cramping. Denies passing tissue.

## 2021-01-19 NOTE — TELEPHONE ENCOUNTER
Called pt for f/u. Pt stated that her bleeding is much less then yesterday and states that it is mild spotting (light pink in color) just with wiping. Pt stated she a minor cramping but it is not painful.  Pt has ER F/u appt with [facility] tomorrow AM. Pt confirm

## 2021-01-19 NOTE — ED NOTES
Patient presents to ER With complaints of vaginal bleeding. Patient admits she is about 13 weeks pregnant, and that the bleeding began earlier today. States it started out as like a pinkish tint, and has gradually gotten darker. Denies any cramping.

## 2021-01-20 ENCOUNTER — HOSPITAL ENCOUNTER (OUTPATIENT)
Dept: PERINATAL CARE | Facility: HOSPITAL | Age: 36
Discharge: HOME | End: 2021-01-20
Attending: OBSTETRICS & GYNECOLOGY
Payer: COMMERCIAL

## 2021-01-20 ENCOUNTER — ROUTINE PRENATAL (OUTPATIENT)
Dept: OBGYN CLINIC | Facility: CLINIC | Age: 36
End: 2021-01-20
Payer: COMMERCIAL

## 2021-01-20 VITALS
WEIGHT: 228 LBS | HEART RATE: 90 BPM | BODY MASS INDEX: 42 KG/M2 | SYSTOLIC BLOOD PRESSURE: 124 MMHG | DIASTOLIC BLOOD PRESSURE: 80 MMHG

## 2021-01-20 VITALS
HEART RATE: 88 BPM | SYSTOLIC BLOOD PRESSURE: 139 MMHG | WEIGHT: 225 LBS | DIASTOLIC BLOOD PRESSURE: 84 MMHG | BODY MASS INDEX: 41 KG/M2

## 2021-01-20 DIAGNOSIS — Z36.9 FIRST TRIMESTER SCREENING: ICD-10-CM

## 2021-01-20 DIAGNOSIS — E66.01 MORBID OBESITY (HCC): ICD-10-CM

## 2021-01-20 DIAGNOSIS — Z36.9 FIRST TRIMESTER SCREENING: Primary | ICD-10-CM

## 2021-01-20 DIAGNOSIS — Z34.92 ENCOUNTER FOR SUPERVISION OF NORMAL PREGNANCY IN SECOND TRIMESTER, UNSPECIFIED GRAVIDITY: Primary | ICD-10-CM

## 2021-01-20 PROBLEM — O26.899 RH NEGATIVE STATE IN ANTEPARTUM PERIOD (HCC): Status: ACTIVE | Noted: 2021-01-20

## 2021-01-20 PROBLEM — Z67.91 RH NEGATIVE STATE IN ANTEPARTUM PERIOD (HCC): Status: ACTIVE | Noted: 2021-01-20

## 2021-01-20 PROBLEM — O26.899 RH NEGATIVE STATE IN ANTEPARTUM PERIOD: Status: ACTIVE | Noted: 2021-01-20

## 2021-01-20 PROBLEM — Z67.91 RH NEGATIVE STATE IN ANTEPARTUM PERIOD: Status: ACTIVE | Noted: 2021-01-20

## 2021-01-20 LAB
APPEARANCE: CLEAR
MULTISTIX LOT#: NORMAL NUMERIC
PH, URINE: 6.5 (ref 4.5–8)
SPECIFIC GRAVITY: 1.02 (ref 1–1.03)
URINE-COLOR: YELLOW

## 2021-01-20 PROCEDURE — 81002 URINALYSIS NONAUTO W/O SCOPE: CPT | Performed by: OBSTETRICS & GYNECOLOGY

## 2021-01-20 PROCEDURE — 76813 OB US NUCHAL MEAS 1 GEST: CPT | Performed by: OBSTETRICS & GYNECOLOGY

## 2021-01-20 PROCEDURE — 3074F SYST BP LT 130 MM HG: CPT | Performed by: OBSTETRICS & GYNECOLOGY

## 2021-01-20 PROCEDURE — 3079F DIAST BP 80-89 MM HG: CPT | Performed by: OBSTETRICS & GYNECOLOGY

## 2021-01-20 PROCEDURE — 99243 OFF/OP CNSLTJ NEW/EST LOW 30: CPT | Performed by: OBSTETRICS & GYNECOLOGY

## 2021-01-20 NOTE — PROGRESS NOTES
Problem visit:   Seen in ED on 1/18 for bleeding. FHTs and IUP on US. Bleeding now light brown spotting. No issues. FTS scheduled for today.  Reviewed hydration and rest. Received Rhogam in ED  RTC as scheduled

## 2021-01-20 NOTE — PROGRESS NOTES
Reason for Consult:   Dear Dr. Kirstin Snyder,    Thank you for requesting ultrasound evaluation and maternal fetal medicine consultation on [facility]. As you are aware she is a 37year old female with a Cash pregnancy at 13w2d.   A maternal-fetal medi Abdomen:  soft, nontender, no contractions noted.            extremities:  nontender, no edema    DISCUSSION  During her visit we discussed and reviewed the following issues:    OBESITY:  Obesity during pregnancy is associated with numerous maternal and risk factors for preeclampsia.              Studies have found that the increased risk of  birth in obese gravidas is primarily associated with obesity-related medical and  complications, rather than an intrinsic predisposition to spontaneou rate.  We discussed options for additional testing should the screen return with an increased risk. We discussed amniocentesis and cell free DNA screening testing.   We discussed the detection rate for Down syndrome is ~90% and the false positive rate is 5 The nasal bone is present. Fetus: arms and legs seen suboptimally. Fetal head/skull and abdomen appeared normal. Stomach and bladder seen. Uterus and adnexa appeared normal    Uterus  Fibroid(s)   1. Size 75 mm x 70 mm x 63 mm. Mean 69.3 mm.  Vol 172.51

## 2021-01-25 ENCOUNTER — TELEPHONE (OUTPATIENT)
Dept: PERINATAL CARE | Facility: HOSPITAL | Age: 36
End: 2021-01-25

## 2021-01-25 NOTE — TELEPHONE ENCOUNTER
After some thought, patient called back. She desires NIPT screen due to abnormal hormone level that is low for BHCG.  We will order NIPT>

## 2021-01-25 NOTE — TELEPHONE ENCOUNTER
We discussed her first trimester screen results. Her screen is low risk for Down syndrome, trisomy 25, trisomy 15. The beta-hCG hormone level is low at the 2.5 percentile.   In this case, we recommend a level 2  anatomy ultrasound at 20 weeks which she is

## 2021-02-01 ENCOUNTER — TELEPHONE (OUTPATIENT)
Dept: OBGYN CLINIC | Facility: CLINIC | Age: 36
End: 2021-02-01

## 2021-02-01 ENCOUNTER — ROUTINE PRENATAL (OUTPATIENT)
Dept: OBGYN CLINIC | Facility: CLINIC | Age: 36
End: 2021-02-01
Payer: COMMERCIAL

## 2021-02-01 VITALS
HEART RATE: 98 BPM | DIASTOLIC BLOOD PRESSURE: 84 MMHG | BODY MASS INDEX: 42 KG/M2 | SYSTOLIC BLOOD PRESSURE: 139 MMHG | WEIGHT: 229.19 LBS

## 2021-02-01 DIAGNOSIS — Z34.02 ENCOUNTER FOR SUPERVISION OF NORMAL FIRST PREGNANCY IN SECOND TRIMESTER: Primary | ICD-10-CM

## 2021-02-01 LAB
APPEARANCE: CLEAR
MULTISTIX LOT#: 1044 NUMERIC
PH, URINE: 6.5 (ref 4.5–8)
SPECIFIC GRAVITY: 1.02 (ref 1–1.03)
URINE-COLOR: YELLOW
UROBILINOGEN,SEMI-QN: 0.2 MG/DL (ref 0–1.9)

## 2021-02-01 PROCEDURE — 81002 URINALYSIS NONAUTO W/O SCOPE: CPT | Performed by: OBSTETRICS & GYNECOLOGY

## 2021-02-01 PROCEDURE — 3075F SYST BP GE 130 - 139MM HG: CPT | Performed by: OBSTETRICS & GYNECOLOGY

## 2021-02-01 PROCEDURE — 3079F DIAST BP 80-89 MM HG: CPT | Performed by: OBSTETRICS & GYNECOLOGY

## 2021-02-01 NOTE — PROGRESS NOTES
Problem visit. Works in [employer]. Having spotting and cramping for 3 days. No recent intercourse. VE-- small amount of brown discharge. No active bleeding.    Keep scheduled PN visit

## 2021-02-01 NOTE — TELEPHONE ENCOUNTER
Pt states she has been cramping for the last few days and started spotting.  Please advise pt is 15 weeks

## 2021-02-01 NOTE — TELEPHONE ENCOUNTER
Pt states that she has been having cramping for the third day. She has brown spotting for three days. Pt has lower abd pelvic area cramping and lower back cramping. Pt states she did have a bm in the am before all the cramping and spotting started.     A

## 2021-02-02 ENCOUNTER — TELEPHONE (OUTPATIENT)
Dept: OBGYN CLINIC | Facility: CLINIC | Age: 36
End: 2021-02-02

## 2021-02-09 ENCOUNTER — ROUTINE PRENATAL (OUTPATIENT)
Dept: OBGYN CLINIC | Facility: CLINIC | Age: 36
End: 2021-02-09
Payer: COMMERCIAL

## 2021-02-09 VITALS
HEART RATE: 93 BPM | BODY MASS INDEX: 41 KG/M2 | SYSTOLIC BLOOD PRESSURE: 126 MMHG | DIASTOLIC BLOOD PRESSURE: 88 MMHG | WEIGHT: 225 LBS

## 2021-02-09 DIAGNOSIS — Z34.02 ENCOUNTER FOR SUPERVISION OF NORMAL FIRST PREGNANCY IN SECOND TRIMESTER: Primary | ICD-10-CM

## 2021-02-09 LAB
APPEARANCE: CLEAR
MULTISTIX LOT#: 5077 NUMERIC
URINE-COLOR: YELLOW

## 2021-02-09 PROCEDURE — 81002 URINALYSIS NONAUTO W/O SCOPE: CPT | Performed by: OBSTETRICS & GYNECOLOGY

## 2021-02-09 PROCEDURE — 3074F SYST BP LT 130 MM HG: CPT | Performed by: OBSTETRICS & GYNECOLOGY

## 2021-02-09 PROCEDURE — 3079F DIAST BP 80-89 MM HG: CPT | Performed by: OBSTETRICS & GYNECOLOGY

## 2021-02-09 NOTE — TELEPHONE ENCOUNTER
Dr. Gary Cross,    Pt is requesting intermittent FMLA due to recurrent vaginal bleeding. Pt is requesting 1-2 flare up per month until the end of of her pregnancy. Do you support? Thank you.   Kristy Michelle

## 2021-02-11 ENCOUNTER — IMMUNIZATION (OUTPATIENT)
Dept: LAB | Facility: HOSPITAL | Age: 36
End: 2021-02-11
Attending: PREVENTIVE MEDICINE
Payer: COMMERCIAL

## 2021-02-11 DIAGNOSIS — Z23 NEED FOR VACCINATION: Primary | ICD-10-CM

## 2021-02-11 PROCEDURE — 0012A SARSCOV2 VAC 100MCG/0.5ML IM: CPT

## 2021-02-15 PROBLEM — D25.9 LEIOMYOMA OF UTERUS AFFECTING PREGNANCY, ANTEPARTUM (HCC): Status: ACTIVE | Noted: 2021-02-15

## 2021-02-15 PROBLEM — O46.92 VAGINAL BLEEDING IN PREGNANCY, SECOND TRIMESTER: Status: ACTIVE | Noted: 2021-02-15

## 2021-02-15 PROBLEM — O34.10 LEIOMYOMA OF UTERUS AFFECTING PREGNANCY, ANTEPARTUM (HCC): Status: ACTIVE | Noted: 2021-02-15

## 2021-02-15 PROBLEM — O34.10 LEIOMYOMA OF UTERUS AFFECTING PREGNANCY, ANTEPARTUM: Status: ACTIVE | Noted: 2021-02-15

## 2021-02-15 PROBLEM — O46.92 VAGINAL BLEEDING IN PREGNANCY, SECOND TRIMESTER (HCC): Status: ACTIVE | Noted: 2021-02-15

## 2021-02-15 PROBLEM — D25.9 LEIOMYOMA OF UTERUS AFFECTING PREGNANCY, ANTEPARTUM: Status: ACTIVE | Noted: 2021-02-15

## 2021-03-08 ENCOUNTER — TELEPHONE (OUTPATIENT)
Dept: PERINATAL CARE | Facility: HOSPITAL | Age: 36
End: 2021-03-08

## 2021-03-10 ENCOUNTER — ROUTINE PRENATAL (OUTPATIENT)
Dept: OBGYN CLINIC | Facility: CLINIC | Age: 36
End: 2021-03-10
Payer: COMMERCIAL

## 2021-03-10 ENCOUNTER — HOSPITAL ENCOUNTER (OUTPATIENT)
Dept: PERINATAL CARE | Facility: HOSPITAL | Age: 36
Discharge: HOME | End: 2021-03-10
Attending: OBSTETRICS & GYNECOLOGY
Payer: COMMERCIAL

## 2021-03-10 VITALS
BODY MASS INDEX: 42 KG/M2 | DIASTOLIC BLOOD PRESSURE: 83 MMHG | WEIGHT: 232 LBS | SYSTOLIC BLOOD PRESSURE: 145 MMHG | HEART RATE: 111 BPM

## 2021-03-10 VITALS
SYSTOLIC BLOOD PRESSURE: 130 MMHG | DIASTOLIC BLOOD PRESSURE: 89 MMHG | BODY MASS INDEX: 42 KG/M2 | HEART RATE: 98 BPM | WEIGHT: 231.38 LBS

## 2021-03-10 DIAGNOSIS — O34.10 LEIOMYOMA OF UTERUS AFFECTING PREGNANCY, ANTEPARTUM: ICD-10-CM

## 2021-03-10 DIAGNOSIS — Z36.3 SCREENING, ANTENATAL, FOR MALFORMATION BY ULTRASOUND: ICD-10-CM

## 2021-03-10 DIAGNOSIS — D25.9 LEIOMYOMA OF UTERUS AFFECTING PREGNANCY, ANTEPARTUM: ICD-10-CM

## 2021-03-10 DIAGNOSIS — O99.212 OBESITY AFFECTING PREGNANCY IN SECOND TRIMESTER: Primary | ICD-10-CM

## 2021-03-10 DIAGNOSIS — O99.212 OBESITY AFFECTING PREGNANCY IN SECOND TRIMESTER: ICD-10-CM

## 2021-03-10 DIAGNOSIS — Z34.02 ENCOUNTER FOR SUPERVISION OF NORMAL FIRST PREGNANCY IN SECOND TRIMESTER: Primary | ICD-10-CM

## 2021-03-10 DIAGNOSIS — E66.01 MORBID OBESITY (HCC): ICD-10-CM

## 2021-03-10 LAB
APPEARANCE: CLEAR
MULTISTIX LOT#: 5077 NUMERIC
PH, URINE: 6.5 (ref 4.5–8)
SPECIFIC GRAVITY: 1.02 (ref 1–1.03)
URINE-COLOR: YELLOW
UROBILINOGEN,SEMI-QN: 0.2 MG/DL (ref 0–1.9)

## 2021-03-10 PROCEDURE — 81002 URINALYSIS NONAUTO W/O SCOPE: CPT | Performed by: OBSTETRICS & GYNECOLOGY

## 2021-03-10 PROCEDURE — 3079F DIAST BP 80-89 MM HG: CPT | Performed by: OBSTETRICS & GYNECOLOGY

## 2021-03-10 PROCEDURE — 99214 OFFICE O/P EST MOD 30 MIN: CPT | Performed by: OBSTETRICS & GYNECOLOGY

## 2021-03-10 PROCEDURE — 76811 OB US DETAILED SNGL FETUS: CPT | Performed by: OBSTETRICS & GYNECOLOGY

## 2021-03-10 PROCEDURE — 3075F SYST BP GE 130 - 139MM HG: CPT | Performed by: OBSTETRICS & GYNECOLOGY

## 2021-03-10 NOTE — PROGRESS NOTES
Reason for Consult:   Dear Dr. Melanie Ulloa,    Thank you for requesting ultrasound evaluation and maternal fetal medicine consultation on [facility]. As you are aware she is a 37year old female with a Cash pregnancy.   A maternal-fetal medicine cons Oriented. No acute distress          Abdomen:  soft, nontender, no contractions noted.            extremities:  nontender, no edema    DISCUSSION  During her visit we discussed and reviewed the following issues:      Low beta-hCG - Adverse outcomes have be x 30 mm. Mean 27.6 mm. Left lateral      Fetal Anatomy:  Visualized with normal appearance: head, face, spine, neck, skin, chest, abdominal wall, gastrointestinal tract, kidneys, bladder, extremities.    Brain: Visualized and normal appearances: brain paren

## 2021-04-07 ENCOUNTER — ROUTINE PRENATAL (OUTPATIENT)
Dept: OBGYN CLINIC | Facility: CLINIC | Age: 36
End: 2021-04-07
Payer: COMMERCIAL

## 2021-04-07 ENCOUNTER — LAB ENCOUNTER (OUTPATIENT)
Dept: LAB | Facility: HOSPITAL | Age: 36
End: 2021-04-07
Attending: OBSTETRICS & GYNECOLOGY
Payer: COMMERCIAL

## 2021-04-07 VITALS
SYSTOLIC BLOOD PRESSURE: 126 MMHG | WEIGHT: 236 LBS | BODY MASS INDEX: 43 KG/M2 | HEART RATE: 97 BPM | DIASTOLIC BLOOD PRESSURE: 85 MMHG

## 2021-04-07 DIAGNOSIS — R30.0 DYSURIA: ICD-10-CM

## 2021-04-07 DIAGNOSIS — Z34.92 ENCOUNTER FOR SUPERVISION OF NORMAL PREGNANCY IN SECOND TRIMESTER, UNSPECIFIED GRAVIDITY: Primary | ICD-10-CM

## 2021-04-07 PROCEDURE — 87086 URINE CULTURE/COLONY COUNT: CPT

## 2021-04-07 PROCEDURE — 87186 SC STD MICRODIL/AGAR DIL: CPT

## 2021-04-07 PROCEDURE — 3079F DIAST BP 80-89 MM HG: CPT | Performed by: OBSTETRICS & GYNECOLOGY

## 2021-04-07 PROCEDURE — 3074F SYST BP LT 130 MM HG: CPT | Performed by: OBSTETRICS & GYNECOLOGY

## 2021-04-07 PROCEDURE — 81002 URINALYSIS NONAUTO W/O SCOPE: CPT | Performed by: OBSTETRICS & GYNECOLOGY

## 2021-04-07 PROCEDURE — 87077 CULTURE AEROBIC IDENTIFY: CPT

## 2021-04-07 PROCEDURE — 81001 URINALYSIS AUTO W/SCOPE: CPT

## 2021-04-07 RX ORDER — VALACYCLOVIR HYDROCHLORIDE 1 G/1
TABLET, FILM COATED ORAL
Qty: 10 TABLET | Refills: 3 | OUTPATIENT
Start: 2021-04-07

## 2021-04-08 ENCOUNTER — TELEPHONE (OUTPATIENT)
Dept: OBGYN CLINIC | Facility: CLINIC | Age: 36
End: 2021-04-08

## 2021-04-08 RX ORDER — NITROFURANTOIN 25; 75 MG/1; MG/1
100 CAPSULE ORAL 2 TIMES DAILY
Qty: 14 CAPSULE | Refills: 0 | Status: SHIPPED | OUTPATIENT
Start: 2021-04-08 | End: 2021-04-15

## 2021-04-08 NOTE — TELEPHONE ENCOUNTER
Reviewed with NJG on call. Pt to start Macrobid BID x 7 days. Pt to call tomorrow to be sure it is the appropriate treatment. Pt verbalized understanding. Rx sent.

## 2021-04-08 NOTE — TELEPHONE ENCOUNTER
Pt is 24w3d, here at office asking for UA/Culture results. + for infection. Will review with NJG on call and notify pt once rx is sent. Pt works here. Will her here til 4.

## 2021-04-09 RX ORDER — CEPHALEXIN 500 MG/1
500 CAPSULE ORAL 2 TIMES DAILY
Qty: 14 CAPSULE | Refills: 0 | Status: SHIPPED | OUTPATIENT
Start: 2021-04-09 | End: 2021-06-02

## 2021-04-09 NOTE — TELEPHONE ENCOUNTER
Pt asking for urine cx results. Informed pt Macrobid is intermediate. Informed pt message will be sent to [address] [place] [address] (on-call). Pt verbalized understanding. NJG- should pt continue with Macrobid? Or switch Rx?

## 2021-04-20 ENCOUNTER — LAB ENCOUNTER (OUTPATIENT)
Dept: LAB | Facility: HOSPITAL | Age: 36
End: 2021-04-20
Attending: OBSTETRICS & GYNECOLOGY
Payer: COMMERCIAL

## 2021-04-20 DIAGNOSIS — Z34.92 ENCOUNTER FOR SUPERVISION OF NORMAL PREGNANCY IN SECOND TRIMESTER, UNSPECIFIED GRAVIDITY: ICD-10-CM

## 2021-04-20 PROCEDURE — 82950 GLUCOSE TEST: CPT

## 2021-04-20 PROCEDURE — 86901 BLOOD TYPING SEROLOGIC RH(D): CPT

## 2021-04-20 PROCEDURE — 85027 COMPLETE CBC AUTOMATED: CPT

## 2021-04-20 PROCEDURE — 86850 RBC ANTIBODY SCREEN: CPT

## 2021-04-20 PROCEDURE — 36415 COLL VENOUS BLD VENIPUNCTURE: CPT

## 2021-04-20 PROCEDURE — 86900 BLOOD TYPING SEROLOGIC ABO: CPT

## 2021-05-05 ENCOUNTER — HOSPITAL ENCOUNTER (OUTPATIENT)
Dept: PERINATAL CARE | Facility: HOSPITAL | Age: 36
Discharge: HOME | End: 2021-05-05
Attending: OBSTETRICS & GYNECOLOGY
Payer: COMMERCIAL

## 2021-05-05 ENCOUNTER — ROUTINE PRENATAL (OUTPATIENT)
Dept: OBGYN CLINIC | Facility: CLINIC | Age: 36
End: 2021-05-05
Payer: COMMERCIAL

## 2021-05-05 VITALS
BODY MASS INDEX: 45 KG/M2 | SYSTOLIC BLOOD PRESSURE: 151 MMHG | WEIGHT: 245 LBS | HEART RATE: 114 BPM | DIASTOLIC BLOOD PRESSURE: 82 MMHG

## 2021-05-05 VITALS
DIASTOLIC BLOOD PRESSURE: 84 MMHG | WEIGHT: 243 LBS | BODY MASS INDEX: 44 KG/M2 | HEART RATE: 97 BPM | SYSTOLIC BLOOD PRESSURE: 126 MMHG

## 2021-05-05 DIAGNOSIS — E66.01 MORBID OBESITY (HCC): ICD-10-CM

## 2021-05-05 DIAGNOSIS — O26.893 RH NEGATIVE STATUS DURING PREGNANCY IN THIRD TRIMESTER: ICD-10-CM

## 2021-05-05 DIAGNOSIS — O99.213 OBESITY AFFECTING PREGNANCY IN THIRD TRIMESTER: ICD-10-CM

## 2021-05-05 DIAGNOSIS — Z34.92 ENCOUNTER FOR SUPERVISION OF NORMAL PREGNANCY IN SECOND TRIMESTER, UNSPECIFIED GRAVIDITY: Primary | ICD-10-CM

## 2021-05-05 DIAGNOSIS — O99.213 OBESITY AFFECTING PREGNANCY IN THIRD TRIMESTER: Primary | ICD-10-CM

## 2021-05-05 DIAGNOSIS — D25.9 LEIOMYOMA OF UTERUS AFFECTING PREGNANCY, ANTEPARTUM: ICD-10-CM

## 2021-05-05 DIAGNOSIS — O34.10 LEIOMYOMA OF UTERUS AFFECTING PREGNANCY, ANTEPARTUM: ICD-10-CM

## 2021-05-05 DIAGNOSIS — Z67.91 RH NEGATIVE STATUS DURING PREGNANCY IN THIRD TRIMESTER: ICD-10-CM

## 2021-05-05 PROCEDURE — 76816 OB US FOLLOW-UP PER FETUS: CPT | Performed by: OBSTETRICS & GYNECOLOGY

## 2021-05-05 PROCEDURE — 81002 URINALYSIS NONAUTO W/O SCOPE: CPT | Performed by: CLINICAL NURSE SPECIALIST

## 2021-05-05 PROCEDURE — 3074F SYST BP LT 130 MM HG: CPT | Performed by: CLINICAL NURSE SPECIALIST

## 2021-05-05 PROCEDURE — 96372 THER/PROPH/DIAG INJ SC/IM: CPT | Performed by: CLINICAL NURSE SPECIALIST

## 2021-05-05 PROCEDURE — 3079F DIAST BP 80-89 MM HG: CPT | Performed by: CLINICAL NURSE SPECIALIST

## 2021-05-05 PROCEDURE — 99213 OFFICE O/P EST LOW 20 MIN: CPT | Performed by: OBSTETRICS & GYNECOLOGY

## 2021-05-05 NOTE — PROGRESS NOTES
Prenatal patient in today for Routine Rhogam injection. Patient is 28w2d today. Patient without complaints. Injection given Left Ventrogluteal . Patient tolerated well. Rhogam information sheet provided.  Patient instructed to contact office with any questi

## 2021-05-05 NOTE — PROGRESS NOTES
Rhogam given today. Fibroids stable on US this week. Next US with MFM at 34 weeks and aware she will start NST's at that time. Denies any vaginal bleeding or cramping.  RTO in 2 weeks

## 2021-05-05 NOTE — PROGRESS NOTES
Reason for Consult:   Dear Dr. Vikas Baker,    Thank you for requesting ultrasound evaluation and maternal fetal medicine consultation on [facility]. As you are aware she is a 37year old female with a Cash pregnancy.   A maternal-fetal medicine cons 151/82   Pulse 114   Wt 245 lb (111.1 kg)   LMP 09/15/2020 (Within Days)   BMI 44.81 kg/m²             Alert and Oriented. No acute distress          Abdomen:  soft, nontender, no contractions noted.            extremities:  nontender, no edema    DISCUSSI 98 mm x 92 mm. Right lateral  2. 54 mm x 21 mm x 52 mm. Anterior   3. 39 mm x 20 mm x 40 mm. Left lateral       IMPRESSION:   1. IUP @  28w2d   2. Scan consistent with dates  3. No fetal structural abnormalities seen   4. Morbid obesity BMI 41  5.   Fibro

## 2021-05-05 NOTE — PROGRESS NOTES
Pt for Growth US  Denies pregnancy complaints  States active fetus  Initial /82, P 114. Repeat /85, P 93. Dr Nithya Duran aware.

## 2021-05-14 ENCOUNTER — TELEPHONE (OUTPATIENT)
Dept: OBGYN CLINIC | Facility: CLINIC | Age: 36
End: 2021-05-14

## 2021-05-14 NOTE — TELEPHONE ENCOUNTER
Received breast pump order via fax. Order form completed and faxed back to [facility].

## 2021-05-19 ENCOUNTER — ROUTINE PRENATAL (OUTPATIENT)
Dept: OBGYN CLINIC | Facility: CLINIC | Age: 36
End: 2021-05-19
Payer: COMMERCIAL

## 2021-05-19 VITALS
SYSTOLIC BLOOD PRESSURE: 122 MMHG | HEART RATE: 92 BPM | DIASTOLIC BLOOD PRESSURE: 81 MMHG | WEIGHT: 244 LBS | BODY MASS INDEX: 45 KG/M2

## 2021-05-19 DIAGNOSIS — Z34.03 ENCOUNTER FOR SUPERVISION OF NORMAL FIRST PREGNANCY IN THIRD TRIMESTER: Primary | ICD-10-CM

## 2021-05-19 PROCEDURE — 3074F SYST BP LT 130 MM HG: CPT | Performed by: OBSTETRICS & GYNECOLOGY

## 2021-05-19 PROCEDURE — 3079F DIAST BP 80-89 MM HG: CPT | Performed by: OBSTETRICS & GYNECOLOGY

## 2021-05-19 PROCEDURE — 90715 TDAP VACCINE 7 YRS/> IM: CPT | Performed by: OBSTETRICS & GYNECOLOGY

## 2021-05-19 PROCEDURE — 90471 IMMUNIZATION ADMIN: CPT | Performed by: OBSTETRICS & GYNECOLOGY

## 2021-05-19 PROCEDURE — 81002 URINALYSIS NONAUTO W/O SCOPE: CPT | Performed by: OBSTETRICS & GYNECOLOGY

## 2021-05-19 NOTE — PROGRESS NOTES
Tdap vaccine administered to pts right deltoid. pt tolerated injection well. VIS info sheet regarding tdap given to pt.

## 2021-05-24 ENCOUNTER — TELEMEDICINE (OUTPATIENT)
Dept: FAMILY MEDICINE CLINIC | Facility: CLINIC | Age: 36
End: 2021-05-24

## 2021-05-24 DIAGNOSIS — B00.1 RECURRENT COLD SORES: Primary | ICD-10-CM

## 2021-05-24 DIAGNOSIS — B35.1 ONYCHOMYCOSIS: ICD-10-CM

## 2021-05-24 PROCEDURE — 99214 OFFICE O/P EST MOD 30 MIN: CPT | Performed by: FAMILY MEDICINE

## 2021-05-24 RX ORDER — VALACYCLOVIR HYDROCHLORIDE 1 G/1
TABLET, FILM COATED ORAL
Qty: 30 TABLET | Refills: 1 | Status: ON HOLD | OUTPATIENT
Start: 2021-05-24 | End: 2021-07-11

## 2021-05-24 NOTE — PROGRESS NOTES
HPI:    Marlys Erickson is a 37year old female presents for video visit with multiple concerns. Cold sores-recurrent issue for patient. Typically takes Valtrex. Outbreaks, needs refill.   Also, patient has noticed that her large toenail is thick/yello visit.  Physical Exam  Constitutional:       General: She is not in acute distress. Pulmonary:      Comments: Breathing appears nonlabored  Neurological:      Mental Status: She is alert.          ASSESSMENT/PLAN:   (B00.1) Recurrent cold sores  (primary e 5/24/2021  Meme Weaver MD

## 2021-06-02 ENCOUNTER — ROUTINE PRENATAL (OUTPATIENT)
Dept: OBGYN CLINIC | Facility: CLINIC | Age: 36
End: 2021-06-02
Payer: COMMERCIAL

## 2021-06-02 VITALS
SYSTOLIC BLOOD PRESSURE: 117 MMHG | WEIGHT: 249 LBS | HEART RATE: 97 BPM | BODY MASS INDEX: 46 KG/M2 | DIASTOLIC BLOOD PRESSURE: 79 MMHG

## 2021-06-02 DIAGNOSIS — O99.210 OBESITY IN PREGNANCY: ICD-10-CM

## 2021-06-02 DIAGNOSIS — Z34.93 ENCOUNTER FOR SUPERVISION OF NORMAL PREGNANCY IN THIRD TRIMESTER, UNSPECIFIED GRAVIDITY: Primary | ICD-10-CM

## 2021-06-02 PROCEDURE — 3078F DIAST BP <80 MM HG: CPT | Performed by: OBSTETRICS & GYNECOLOGY

## 2021-06-02 PROCEDURE — 81002 URINALYSIS NONAUTO W/O SCOPE: CPT | Performed by: OBSTETRICS & GYNECOLOGY

## 2021-06-02 PROCEDURE — 3074F SYST BP LT 130 MM HG: CPT | Performed by: OBSTETRICS & GYNECOLOGY

## 2021-06-16 ENCOUNTER — HOSPITAL ENCOUNTER (OUTPATIENT)
Dept: PERINATAL CARE | Facility: HOSPITAL | Age: 36
Discharge: HOME | End: 2021-06-16
Attending: OBSTETRICS & GYNECOLOGY
Payer: COMMERCIAL

## 2021-06-16 ENCOUNTER — ROUTINE PRENATAL (OUTPATIENT)
Dept: OBGYN CLINIC | Facility: CLINIC | Age: 36
End: 2021-06-16
Payer: COMMERCIAL

## 2021-06-16 VITALS
SYSTOLIC BLOOD PRESSURE: 127 MMHG | HEART RATE: 86 BPM | WEIGHT: 254.81 LBS | BODY MASS INDEX: 47 KG/M2 | DIASTOLIC BLOOD PRESSURE: 85 MMHG

## 2021-06-16 VITALS
HEART RATE: 88 BPM | DIASTOLIC BLOOD PRESSURE: 93 MMHG | WEIGHT: 255 LBS | SYSTOLIC BLOOD PRESSURE: 135 MMHG | BODY MASS INDEX: 47 KG/M2

## 2021-06-16 DIAGNOSIS — O34.10 LEIOMYOMA OF UTERUS AFFECTING PREGNANCY, ANTEPARTUM: ICD-10-CM

## 2021-06-16 DIAGNOSIS — O99.213 OBESITY AFFECTING PREGNANCY IN THIRD TRIMESTER: ICD-10-CM

## 2021-06-16 DIAGNOSIS — D25.9 LEIOMYOMA OF UTERUS AFFECTING PREGNANCY, ANTEPARTUM: ICD-10-CM

## 2021-06-16 DIAGNOSIS — Z34.91 ENCOUNTER FOR SUPERVISION OF NORMAL PREGNANCY IN FIRST TRIMESTER, UNSPECIFIED GRAVIDITY: Primary | ICD-10-CM

## 2021-06-16 DIAGNOSIS — O99.213 OBESITY AFFECTING PREGNANCY IN THIRD TRIMESTER: Primary | ICD-10-CM

## 2021-06-16 PROCEDURE — 99213 OFFICE O/P EST LOW 20 MIN: CPT | Performed by: OBSTETRICS & GYNECOLOGY

## 2021-06-16 PROCEDURE — 76819 FETAL BIOPHYS PROFIL W/O NST: CPT | Performed by: OBSTETRICS & GYNECOLOGY

## 2021-06-16 PROCEDURE — 76816 OB US FOLLOW-UP PER FETUS: CPT | Performed by: OBSTETRICS & GYNECOLOGY

## 2021-06-16 PROCEDURE — 81002 URINALYSIS NONAUTO W/O SCOPE: CPT | Performed by: OBSTETRICS & GYNECOLOGY

## 2021-06-16 PROCEDURE — 3074F SYST BP LT 130 MM HG: CPT | Performed by: OBSTETRICS & GYNECOLOGY

## 2021-06-16 PROCEDURE — 3079F DIAST BP 80-89 MM HG: CPT | Performed by: OBSTETRICS & GYNECOLOGY

## 2021-06-16 NOTE — PROGRESS NOTES
Pt for Growth US  Denies pregnancy complaints  States active fetus  Initial /93, P 88.    Repeat /81, P 82

## 2021-06-16 NOTE — PROGRESS NOTES
Reason for Consult:   Dear Dr. Melanie Ulloa,    Thank you for requesting ultrasound evaluation and maternal fetal medicine consultation on [facility]. As you are aware she is a 37year old female with a Cash pregnancy.   A maternal-fetal medicine cons Oriented. No acute distress          Abdomen:  soft, nontender, no contractions noted.            extremities:  nontender, no edema    DISCUSSION  During her visit we discussed and reviewed the following issues:      Low beta-hCG - Adverse outcomes have be lateral         38 x 20 x 44 mm      IMPRESSION:   1. IUP @  34w2d   2. Scan consistent with dates  3. No fetal structural abnormalities seen   4. Morbid obesity BMI 41  5. Fibroids  6. Low hCG on First trimester screen  RECOMMENDATIONS:   1.   Weekly NS

## 2021-06-16 NOTE — PROGRESS NOTES
No issues. MFM growth today. Needs to schedule NSTs. Has 3T labs and I added urine culture due to recurrent UTIs. RTC 2 wks .

## 2021-06-28 ENCOUNTER — NST DOCUMENTATION (OUTPATIENT)
Dept: OBGYN CLINIC | Facility: CLINIC | Age: 36
End: 2021-06-28

## 2021-06-28 ENCOUNTER — HOSPITAL ENCOUNTER (OUTPATIENT)
Facility: HOSPITAL | Age: 36
Discharge: HOME | End: 2021-06-28
Attending: OBSTETRICS & GYNECOLOGY | Admitting: OBSTETRICS & GYNECOLOGY
Payer: COMMERCIAL

## 2021-06-28 ENCOUNTER — APPOINTMENT (OUTPATIENT)
Dept: OBGYN CLINIC | Facility: HOSPITAL | Age: 36
End: 2021-06-28
Payer: COMMERCIAL

## 2021-06-28 VITALS — HEART RATE: 74 BPM | DIASTOLIC BLOOD PRESSURE: 53 MMHG | SYSTOLIC BLOOD PRESSURE: 133 MMHG

## 2021-06-28 PROCEDURE — 59025 FETAL NON-STRESS TEST: CPT

## 2021-06-28 PROCEDURE — 59025 FETAL NON-STRESS TEST: CPT | Performed by: OBSTETRICS & GYNECOLOGY

## 2021-06-29 ENCOUNTER — LAB ENCOUNTER (OUTPATIENT)
Dept: LAB | Facility: HOSPITAL | Age: 36
End: 2021-06-29
Attending: OBSTETRICS & GYNECOLOGY
Payer: COMMERCIAL

## 2021-06-29 DIAGNOSIS — Z34.91 ENCOUNTER FOR SUPERVISION OF NORMAL PREGNANCY IN FIRST TRIMESTER, UNSPECIFIED GRAVIDITY: ICD-10-CM

## 2021-06-29 LAB
DEPRECATED RDW RBC AUTO: 42.2 FL (ref 35.1–46.3)
ERYTHROCYTE [DISTWIDTH] IN BLOOD BY AUTOMATED COUNT: 13.1 % (ref 11–15)
HCT VFR BLD AUTO: 36.7 %
HGB BLD-MCNC: 12 G/DL
MCH RBC QN AUTO: 29.1 PG (ref 26–34)
MCHC RBC AUTO-ENTMCNC: 32.7 G/DL (ref 31–37)
MCV RBC AUTO: 88.9 FL
PLATELET # BLD AUTO: 225 10(3)UL (ref 150–450)
RBC # BLD AUTO: 4.13 X10(6)UL
WBC # BLD AUTO: 10.8 X10(3) UL (ref 4–11)

## 2021-06-29 PROCEDURE — 86780 TREPONEMA PALLIDUM: CPT

## 2021-06-29 PROCEDURE — 85027 COMPLETE CBC AUTOMATED: CPT

## 2021-06-29 PROCEDURE — 87389 HIV-1 AG W/HIV-1&-2 AB AG IA: CPT

## 2021-06-29 PROCEDURE — 36415 COLL VENOUS BLD VENIPUNCTURE: CPT

## 2021-06-29 PROCEDURE — 87086 URINE CULTURE/COLONY COUNT: CPT

## 2021-06-29 NOTE — NST
Nonstress Test   Patient: Dyllan Amin    Gestation: 36w0d    Diagnosis from order: Obesity in pregnancy       NST:         NST DOCUMENTATION 6/28/2021   Variability 6-25 BPM   Accelerations Yes   Decelerations None   Baseline 135   Uterine Irritability

## 2021-06-30 ENCOUNTER — ROUTINE PRENATAL (OUTPATIENT)
Dept: OBGYN CLINIC | Facility: CLINIC | Age: 36
End: 2021-06-30
Payer: COMMERCIAL

## 2021-06-30 ENCOUNTER — HOSPITAL ENCOUNTER (OUTPATIENT)
Facility: HOSPITAL | Age: 36
Setting detail: OBSERVATION
Discharge: HOME | End: 2021-06-30
Attending: OBSTETRICS & GYNECOLOGY | Admitting: OBSTETRICS & GYNECOLOGY
Payer: COMMERCIAL

## 2021-06-30 ENCOUNTER — TELEPHONE (OUTPATIENT)
Dept: OBGYN CLINIC | Facility: CLINIC | Age: 36
End: 2021-06-30

## 2021-06-30 VITALS — DIASTOLIC BLOOD PRESSURE: 78 MMHG | SYSTOLIC BLOOD PRESSURE: 136 MMHG | HEART RATE: 65 BPM | RESPIRATION RATE: 16 BRPM

## 2021-06-30 VITALS
HEART RATE: 84 BPM | WEIGHT: 257 LBS | DIASTOLIC BLOOD PRESSURE: 89 MMHG | SYSTOLIC BLOOD PRESSURE: 137 MMHG | BODY MASS INDEX: 47 KG/M2

## 2021-06-30 DIAGNOSIS — Z34.93 ENCOUNTER FOR SUPERVISION OF NORMAL PREGNANCY IN THIRD TRIMESTER, UNSPECIFIED GRAVIDITY: Primary | ICD-10-CM

## 2021-06-30 PROBLEM — R03.0 ELEVATED BP WITHOUT DIAGNOSIS OF HYPERTENSION: Status: ACTIVE | Noted: 2021-06-30

## 2021-06-30 LAB
ALBUMIN SERPL-MCNC: 2.7 G/DL (ref 3.4–5)
ALBUMIN/GLOB SERPL: 0.7 {RATIO} (ref 1–2)
ALP LIVER SERPL-CCNC: 95 U/L
ALT SERPL-CCNC: 16 U/L
ANION GAP SERPL CALC-SCNC: 9 MMOL/L (ref 0–18)
AST SERPL-CCNC: 13 U/L (ref 15–37)
BASOPHILS # BLD AUTO: 0.03 X10(3) UL (ref 0–0.2)
BASOPHILS NFR BLD AUTO: 0.3 %
BILIRUB SERPL-MCNC: 0.3 MG/DL (ref 0.1–2)
BUN BLD-MCNC: 8 MG/DL (ref 7–18)
BUN/CREAT SERPL: 18.6 (ref 10–20)
CALCIUM BLD-MCNC: 9 MG/DL (ref 8.5–10.1)
CHLORIDE SERPL-SCNC: 107 MMOL/L (ref 98–112)
CO2 SERPL-SCNC: 21 MMOL/L (ref 21–32)
CREAT BLD-MCNC: 0.43 MG/DL
CREAT UR-SCNC: 117 MG/DL
DEPRECATED RDW RBC AUTO: 42.5 FL (ref 35.1–46.3)
EOSINOPHIL # BLD AUTO: 0.08 X10(3) UL (ref 0–0.7)
EOSINOPHIL NFR BLD AUTO: 0.8 %
ERYTHROCYTE [DISTWIDTH] IN BLOOD BY AUTOMATED COUNT: 13.2 % (ref 11–15)
GLOBULIN PLAS-MCNC: 4.1 G/DL (ref 2.8–4.4)
GLUCOSE BLD-MCNC: 80 MG/DL (ref 70–99)
HCT VFR BLD AUTO: 37.3 %
HGB BLD-MCNC: 12.4 G/DL
IMM GRANULOCYTES # BLD AUTO: 0.05 X10(3) UL (ref 0–1)
IMM GRANULOCYTES NFR BLD: 0.5 %
LYMPHOCYTES # BLD AUTO: 1.33 X10(3) UL (ref 1–4)
LYMPHOCYTES NFR BLD AUTO: 13.9 %
M PROTEIN MFR SERPL ELPH: 6.8 G/DL (ref 6.4–8.2)
MCH RBC QN AUTO: 29.5 PG (ref 26–34)
MCHC RBC AUTO-ENTMCNC: 33.2 G/DL (ref 31–37)
MCV RBC AUTO: 88.6 FL
MONOCYTES # BLD AUTO: 0.68 X10(3) UL (ref 0.1–1)
MONOCYTES NFR BLD AUTO: 7.1 %
NEUTROPHILS # BLD AUTO: 7.4 X10 (3) UL (ref 1.5–7.7)
NEUTROPHILS # BLD AUTO: 7.4 X10(3) UL (ref 1.5–7.7)
NEUTROPHILS NFR BLD AUTO: 77.4 %
OSMOLALITY SERPL CALC.SUM OF ELEC: 281 MOSM/KG (ref 275–295)
PLATELET # BLD AUTO: 214 10(3)UL (ref 150–450)
POTASSIUM SERPL-SCNC: 4.2 MMOL/L (ref 3.5–5.1)
PROT UR-MCNC: 36.7 MG/DL
PROT/CREAT UR-RTO: 0.31
RBC # BLD AUTO: 4.21 X10(6)UL
SODIUM SERPL-SCNC: 137 MMOL/L (ref 136–145)
T PALLIDUM AB SER QL: NEGATIVE
WBC # BLD AUTO: 9.6 X10(3) UL (ref 4–11)

## 2021-06-30 PROCEDURE — 3075F SYST BP GE 130 - 139MM HG: CPT | Performed by: OBSTETRICS & GYNECOLOGY

## 2021-06-30 PROCEDURE — 84156 ASSAY OF PROTEIN URINE: CPT | Performed by: OBSTETRICS & GYNECOLOGY

## 2021-06-30 PROCEDURE — 80053 COMPREHEN METABOLIC PANEL: CPT | Performed by: OBSTETRICS & GYNECOLOGY

## 2021-06-30 PROCEDURE — 3079F DIAST BP 80-89 MM HG: CPT | Performed by: OBSTETRICS & GYNECOLOGY

## 2021-06-30 PROCEDURE — 81002 URINALYSIS NONAUTO W/O SCOPE: CPT | Performed by: OBSTETRICS & GYNECOLOGY

## 2021-06-30 PROCEDURE — 59025 FETAL NON-STRESS TEST: CPT

## 2021-06-30 PROCEDURE — 99213 OFFICE O/P EST LOW 20 MIN: CPT

## 2021-06-30 PROCEDURE — 82570 ASSAY OF URINE CREATININE: CPT | Performed by: OBSTETRICS & GYNECOLOGY

## 2021-06-30 PROCEDURE — 85025 COMPLETE CBC W/AUTO DIFF WBC: CPT | Performed by: OBSTETRICS & GYNECOLOGY

## 2021-06-30 PROCEDURE — 36415 COLL VENOUS BLD VENIPUNCTURE: CPT

## 2021-06-30 PROCEDURE — 59025 FETAL NON-STRESS TEST: CPT | Performed by: OBSTETRICS & GYNECOLOGY

## 2021-06-30 NOTE — PROGRESS NOTES
Pt is a 37year old female admitted to TR2/TR2-A. Patient presents with:  R/o Pih: pt. sent from the office to r/o Preeclampsia. Labwork ordered  Non-stress Test     Pt is  36w2d intra-uterine pregnancy. History obtained, pt.  Oriented to room,

## 2021-06-30 NOTE — TELEPHONE ENCOUNTER
Dr. Latonia Moya,     Please sign off on 2 forms:  **FMLA and short term disability**    -Highlight the patient and hit \"Chart\" button.   -In Chart Review, w/in the Encounter tab - click 1 time on the Telephone call encounter for 6/30/21 Scroll down the tele

## 2021-06-30 NOTE — PROGRESS NOTES
No issues. Swelling worsening. No preE symptoms otherwise. Had elevated BP with MFM growth and borderline today with 2+ protein on Udip. Missed recent NST so will send to [facility] for NST, labs, BP monitoring. GBS collected. RTC 1 wk.

## 2021-07-01 ENCOUNTER — LAB ENCOUNTER (OUTPATIENT)
Dept: LAB | Facility: HOSPITAL | Age: 36
End: 2021-07-01
Attending: OBSTETRICS & GYNECOLOGY
Payer: COMMERCIAL

## 2021-07-01 ENCOUNTER — TELEPHONE (OUTPATIENT)
Dept: OBGYN CLINIC | Facility: CLINIC | Age: 36
End: 2021-07-01

## 2021-07-01 DIAGNOSIS — Z34.93 ENCOUNTER FOR SUPERVISION OF NORMAL PREGNANCY IN THIRD TRIMESTER: Primary | ICD-10-CM

## 2021-07-01 PROBLEM — O12.13 PROTEINURIA AFFECTING PREGNANCY IN THIRD TRIMESTER (HCC): Status: ACTIVE | Noted: 2021-07-01

## 2021-07-01 PROBLEM — O12.13 PROTEINURIA AFFECTING PREGNANCY IN THIRD TRIMESTER: Status: ACTIVE | Noted: 2021-07-01

## 2021-07-01 LAB
CREAT UR-SCNC: 1.56 G/24 HR (ref 0.6–1.8)
M PROTEIN 24H UR ELPH-MRATE: 319.6 MG/24 HR (ref ?–149.1)
SPECIMEN VOL UR: 1700 ML
SPECIMEN VOL UR: 1700 ML

## 2021-07-01 PROCEDURE — 84156 ASSAY OF PROTEIN URINE: CPT

## 2021-07-01 PROCEDURE — 82570 ASSAY OF URINE CREATININE: CPT

## 2021-07-02 ENCOUNTER — ROUTINE PRENATAL (OUTPATIENT)
Dept: OBGYN CLINIC | Facility: CLINIC | Age: 36
End: 2021-07-02
Payer: COMMERCIAL

## 2021-07-02 ENCOUNTER — TELEPHONE (OUTPATIENT)
Dept: OBGYN CLINIC | Facility: CLINIC | Age: 36
End: 2021-07-02

## 2021-07-02 ENCOUNTER — NST DOCUMENTATION (OUTPATIENT)
Dept: OBGYN CLINIC | Facility: CLINIC | Age: 36
End: 2021-07-02

## 2021-07-02 ENCOUNTER — ORDERS FOR ADMISSION (OUTPATIENT)
Dept: OBGYN CLINIC | Facility: CLINIC | Age: 36
End: 2021-07-02

## 2021-07-02 VITALS
BODY MASS INDEX: 47 KG/M2 | DIASTOLIC BLOOD PRESSURE: 86 MMHG | WEIGHT: 256.19 LBS | SYSTOLIC BLOOD PRESSURE: 139 MMHG | HEART RATE: 83 BPM

## 2021-07-02 DIAGNOSIS — Z34.03 ENCOUNTER FOR SUPERVISION OF NORMAL FIRST PREGNANCY IN THIRD TRIMESTER: Primary | ICD-10-CM

## 2021-07-02 DIAGNOSIS — Z34.93 ENCOUNTER FOR SUPERVISION OF NORMAL PREGNANCY IN THIRD TRIMESTER, UNSPECIFIED GRAVIDITY: Primary | ICD-10-CM

## 2021-07-02 PROBLEM — O14.93 PRE-ECLAMPSIA IN THIRD TRIMESTER (HCC): Status: ACTIVE | Noted: 2021-07-02

## 2021-07-02 PROBLEM — O14.93 PRE-ECLAMPSIA IN THIRD TRIMESTER: Status: ACTIVE | Noted: 2021-07-02

## 2021-07-02 LAB
BILIRUBIN: NEGATIVE
GLUCOSE (URINE DIPSTICK): NEGATIVE MG/DL
LEUKOCYTES: NEGATIVE
MULTISTIX EXPIRATION DATE: NORMAL DATE
MULTISTIX LOT#: 1027 NUMERIC
NITRITE, URINE: NEGATIVE
OCCULT BLOOD: NEGATIVE
PH, URINE: 7.5 (ref 4.5–8)
SPECIFIC GRAVITY: 1.01 (ref 1–1.03)
UROBILINOGEN,SEMI-QN: 0.2 MG/DL (ref 0–1.9)

## 2021-07-02 PROCEDURE — 3079F DIAST BP 80-89 MM HG: CPT | Performed by: OBSTETRICS & GYNECOLOGY

## 2021-07-02 PROCEDURE — 81002 URINALYSIS NONAUTO W/O SCOPE: CPT | Performed by: OBSTETRICS & GYNECOLOGY

## 2021-07-02 PROCEDURE — 3075F SYST BP GE 130 - 139MM HG: CPT | Performed by: OBSTETRICS & GYNECOLOGY

## 2021-07-02 RX ORDER — ONDANSETRON 2 MG/ML
4 INJECTION INTRAMUSCULAR; INTRAVENOUS EVERY 6 HOURS PRN
Status: CANCELLED | OUTPATIENT
Start: 2021-07-02

## 2021-07-02 RX ORDER — AMMONIA INHALANTS 0.04 G/.3ML
0.3 INHALANT RESPIRATORY (INHALATION) AS NEEDED
Status: CANCELLED | OUTPATIENT
Start: 2021-07-02

## 2021-07-02 RX ORDER — SODIUM CHLORIDE, SODIUM LACTATE, POTASSIUM CHLORIDE, CALCIUM CHLORIDE 600; 310; 30; 20 MG/100ML; MG/100ML; MG/100ML; MG/100ML
INJECTION, SOLUTION INTRAVENOUS CONTINUOUS
Status: CANCELLED | OUTPATIENT
Start: 2021-07-02

## 2021-07-02 RX ORDER — MISOPROSTOL 100 UG/1
25 TABLET ORAL EVERY 4 HOURS
Status: CANCELLED | OUTPATIENT
Start: 2021-07-02 | End: 2021-07-03

## 2021-07-02 RX ORDER — LIDOCAINE HYDROCHLORIDE 10 MG/ML
30 INJECTION, SOLUTION EPIDURAL; INFILTRATION; INTRACAUDAL; PERINEURAL ONCE
Status: CANCELLED | OUTPATIENT
Start: 2021-07-02 | End: 2021-07-02

## 2021-07-02 RX ORDER — IBUPROFEN 600 MG/1
600 TABLET ORAL EVERY 6 HOURS PRN
Status: CANCELLED | OUTPATIENT
Start: 2021-07-02 | End: 2021-07-04

## 2021-07-02 RX ORDER — ACETAMINOPHEN 500 MG
500 TABLET ORAL EVERY 6 HOURS PRN
Status: CANCELLED | OUTPATIENT
Start: 2021-07-02

## 2021-07-02 RX ORDER — NALBUPHINE HCL 10 MG/ML
10 AMPUL (ML) INJECTION
Status: CANCELLED | OUTPATIENT
Start: 2021-07-02

## 2021-07-02 RX ORDER — TERBUTALINE SULFATE 1 MG/ML
0.25 INJECTION, SOLUTION SUBCUTANEOUS AS NEEDED
Status: CANCELLED | OUTPATIENT
Start: 2021-07-02

## 2021-07-02 RX ORDER — DEXTROSE, SODIUM CHLORIDE, SODIUM LACTATE, POTASSIUM CHLORIDE, AND CALCIUM CHLORIDE 5; .6; .31; .03; .02 G/100ML; G/100ML; G/100ML; G/100ML; G/100ML
INJECTION, SOLUTION INTRAVENOUS AS NEEDED
Status: CANCELLED | OUTPATIENT
Start: 2021-07-02

## 2021-07-02 RX ORDER — TRISODIUM CITRATE DIHYDRATE AND CITRIC ACID MONOHYDRATE 500; 334 MG/5ML; MG/5ML
30 SOLUTION ORAL AS NEEDED
Status: CANCELLED | OUTPATIENT
Start: 2021-07-02

## 2021-07-02 NOTE — TELEPHONE ENCOUNTER
Called and spoke to pt regarding COVID testing, informed pt she would need to be tested no later then 7/3. Number to CS given to schedule test. Pt states understanding and has no questions.

## 2021-07-02 NOTE — TELEPHONE ENCOUNTER
Abnormal protein -- has OB nurse appt for BP check tomorrow.  Need to review w/ MD in office plan of IOL at 37 wks based on BP check in office given >300 protein on urine

## 2021-07-02 NOTE — TELEPHONE ENCOUNTER
Spoke to DR Maritza Brothers from [place] [address]. She recs IOL at 37 weeks for preE. IOL scheduled 7/6 at 6pm due to [facility] availability. Reviewed with [facility] and instructions given. Pt to check BP TID and call with severe range BPs or preE symptoms.   Will have RNs coordinate

## 2021-07-02 NOTE — PROGRESS NOTES
Here for [facility] follow up. Was sent earlier this week for borderline BP and 2+ protein on dip  2 elevated BPs in triage but otherwise BP okay. Her PC ratio was 0.31. Dr Ajit Meraz sent patient home with 24 h urine protein collection. Resulted 319mg.   Pt feels we

## 2021-07-02 NOTE — NST
Nonstress Test   Patient: Toya Estrada    Gestation: 36w4d    Diagnosis from order: Obesity in pregnancy, PIH       NST:         NST DOCUMENTATION 6/30/2021   Variability 6-25 BPM   Accelerations Yes   Decelerations None   Baseline 135   Uterine Irritab

## 2021-07-02 NOTE — TELEPHONE ENCOUNTER
Pt has an appt today, 7/2, at 1140am for BP and urine dip.     (Note from [address] yesterday stated abnormal protein --  Need to review w/ MD in office plan of IOL at 37 wks based on BP check in office given >300 protein on urine.)

## 2021-07-02 NOTE — TELEPHONE ENCOUNTER
Pt. discussed at provider meeting this morning. Pt. To be seen by MD today instead of nurse schedule. Appt scheduled with KIRSTY at 11:40 and BP appt with RN at 1pm cancelled. Pt. Notified.

## 2021-07-03 ENCOUNTER — LAB ENCOUNTER (OUTPATIENT)
Dept: LAB | Facility: HOSPITAL | Age: 36
End: 2021-07-03
Attending: OBSTETRICS & GYNECOLOGY
Payer: COMMERCIAL

## 2021-07-03 DIAGNOSIS — Z34.93 ENCOUNTER FOR SUPERVISION OF NORMAL PREGNANCY IN THIRD TRIMESTER, UNSPECIFIED GRAVIDITY: ICD-10-CM

## 2021-07-03 LAB — SARS-COV-2 RNA RESP QL NAA+PROBE: NOT DETECTED

## 2021-07-05 ENCOUNTER — APPOINTMENT (OUTPATIENT)
Dept: OBGYN CLINIC | Facility: HOSPITAL | Age: 36
End: 2021-07-05
Payer: COMMERCIAL

## 2021-07-05 ENCOUNTER — HOSPITAL ENCOUNTER (OUTPATIENT)
Facility: HOSPITAL | Age: 36
Discharge: HOME | End: 2021-07-05
Attending: OBSTETRICS & GYNECOLOGY | Admitting: OBSTETRICS & GYNECOLOGY
Payer: COMMERCIAL

## 2021-07-05 VITALS — SYSTOLIC BLOOD PRESSURE: 139 MMHG | HEART RATE: 79 BPM | DIASTOLIC BLOOD PRESSURE: 78 MMHG

## 2021-07-05 PROCEDURE — 59025 FETAL NON-STRESS TEST: CPT

## 2021-07-06 ENCOUNTER — HOSPITAL ENCOUNTER (INPATIENT)
Facility: HOSPITAL | Age: 36
LOS: 6 days | Discharge: HOME | End: 2021-07-12
Attending: OBSTETRICS & GYNECOLOGY | Admitting: OBSTETRICS & GYNECOLOGY
Payer: COMMERCIAL

## 2021-07-06 ENCOUNTER — APPOINTMENT (OUTPATIENT)
Dept: OBGYN CLINIC | Facility: HOSPITAL | Age: 36
End: 2021-07-06
Attending: OBSTETRICS & GYNECOLOGY
Payer: COMMERCIAL

## 2021-07-06 PROBLEM — O14.90 PREECLAMPSIA (HCC): Status: ACTIVE | Noted: 2021-07-06

## 2021-07-06 PROBLEM — O14.90 PREECLAMPSIA: Status: ACTIVE | Noted: 2021-07-06

## 2021-07-06 LAB
ALBUMIN SERPL-MCNC: 2.5 G/DL (ref 3.4–5)
ALBUMIN/GLOB SERPL: 0.6 {RATIO} (ref 1–2)
ALP LIVER SERPL-CCNC: 105 U/L
ALT SERPL-CCNC: 16 U/L
ANION GAP SERPL CALC-SCNC: 10 MMOL/L (ref 0–18)
ANTIBODY SCREEN: POSITIVE
AST SERPL-CCNC: 20 U/L (ref 15–37)
BASOPHILS # BLD AUTO: 0.02 X10(3) UL (ref 0–0.2)
BASOPHILS NFR BLD AUTO: 0.2 %
BILIRUB SERPL-MCNC: 0.2 MG/DL (ref 0.1–2)
BUN BLD-MCNC: 9 MG/DL (ref 7–18)
BUN/CREAT SERPL: 17.6 (ref 10–20)
CALCIUM BLD-MCNC: 9.1 MG/DL (ref 8.5–10.1)
CHLORIDE SERPL-SCNC: 107 MMOL/L (ref 98–112)
CO2 SERPL-SCNC: 23 MMOL/L (ref 21–32)
CREAT BLD-MCNC: 0.51 MG/DL
DEPRECATED RDW RBC AUTO: 43.9 FL (ref 35.1–46.3)
DIRECT COOMBS POLY: NEGATIVE
EOSINOPHIL # BLD AUTO: 0.07 X10(3) UL (ref 0–0.7)
EOSINOPHIL NFR BLD AUTO: 0.7 %
ERYTHROCYTE [DISTWIDTH] IN BLOOD BY AUTOMATED COUNT: 13.2 % (ref 11–15)
GLOBULIN PLAS-MCNC: 3.9 G/DL (ref 2.8–4.4)
GLUCOSE BLD-MCNC: 74 MG/DL (ref 70–99)
HCT VFR BLD AUTO: 35.8 %
HGB BLD-MCNC: 11.7 G/DL
IMM GRANULOCYTES # BLD AUTO: 0.05 X10(3) UL (ref 0–1)
IMM GRANULOCYTES NFR BLD: 0.5 %
LYMPHOCYTES # BLD AUTO: 1.75 X10(3) UL (ref 1–4)
LYMPHOCYTES NFR BLD AUTO: 18 %
M PROTEIN MFR SERPL ELPH: 6.4 G/DL (ref 6.4–8.2)
MCH RBC QN AUTO: 29.7 PG (ref 26–34)
MCHC RBC AUTO-ENTMCNC: 32.7 G/DL (ref 31–37)
MCV RBC AUTO: 90.9 FL
MONOCYTES # BLD AUTO: 0.88 X10(3) UL (ref 0.1–1)
MONOCYTES NFR BLD AUTO: 9 %
NEUTROPHILS # BLD AUTO: 6.97 X10 (3) UL (ref 1.5–7.7)
NEUTROPHILS # BLD AUTO: 6.97 X10(3) UL (ref 1.5–7.7)
NEUTROPHILS NFR BLD AUTO: 71.6 %
OSMOLALITY SERPL CALC.SUM OF ELEC: 287 MOSM/KG (ref 275–295)
PLATELET # BLD AUTO: 243 10(3)UL (ref 150–450)
POTASSIUM SERPL-SCNC: 3.7 MMOL/L (ref 3.5–5.1)
RBC # BLD AUTO: 3.94 X10(6)UL
RH BLOOD TYPE: NEGATIVE
SODIUM SERPL-SCNC: 140 MMOL/L (ref 136–145)
WBC # BLD AUTO: 9.7 X10(3) UL (ref 4–11)

## 2021-07-06 PROCEDURE — 3E033VJ INTRODUCTION OF OTHER HORMONE INTO PERIPHERAL VEIN, PERCUTANEOUS APPROACH: ICD-10-PCS | Performed by: OBSTETRICS & GYNECOLOGY

## 2021-07-06 RX ORDER — ZOLPIDEM TARTRATE 5 MG/1
10 TABLET ORAL NIGHTLY PRN
Status: DISCONTINUED | OUTPATIENT
Start: 2021-07-06 | End: 2021-07-08 | Stop reason: HOSPADM

## 2021-07-06 RX ORDER — ACETAMINOPHEN 500 MG
500 TABLET ORAL EVERY 6 HOURS PRN
Status: DISCONTINUED | OUTPATIENT
Start: 2021-07-06 | End: 2021-07-08 | Stop reason: HOSPADM

## 2021-07-06 RX ORDER — NALBUPHINE HCL 10 MG/ML
10 AMPUL (ML) INJECTION
Status: DISCONTINUED | OUTPATIENT
Start: 2021-07-06 | End: 2021-07-08 | Stop reason: HOSPADM

## 2021-07-06 RX ORDER — IBUPROFEN 600 MG/1
600 TABLET ORAL EVERY 6 HOURS PRN
Status: DISCONTINUED | OUTPATIENT
Start: 2021-07-06 | End: 2021-07-08 | Stop reason: HOSPADM

## 2021-07-06 RX ORDER — ONDANSETRON 2 MG/ML
4 INJECTION INTRAMUSCULAR; INTRAVENOUS EVERY 6 HOURS PRN
Status: DISCONTINUED | OUTPATIENT
Start: 2021-07-06 | End: 2021-07-08 | Stop reason: HOSPADM

## 2021-07-06 RX ORDER — TRISODIUM CITRATE DIHYDRATE AND CITRIC ACID MONOHYDRATE 500; 334 MG/5ML; MG/5ML
30 SOLUTION ORAL AS NEEDED
Status: COMPLETED | OUTPATIENT
Start: 2021-07-06 | End: 2021-07-08

## 2021-07-06 RX ORDER — DEXTROSE, SODIUM CHLORIDE, SODIUM LACTATE, POTASSIUM CHLORIDE, AND CALCIUM CHLORIDE 5; .6; .31; .03; .02 G/100ML; G/100ML; G/100ML; G/100ML; G/100ML
INJECTION, SOLUTION INTRAVENOUS AS NEEDED
Status: DISCONTINUED | OUTPATIENT
Start: 2021-07-06 | End: 2021-07-08 | Stop reason: HOSPADM

## 2021-07-06 RX ORDER — AMMONIA INHALANTS 0.04 G/.3ML
0.3 INHALANT RESPIRATORY (INHALATION) AS NEEDED
Status: DISCONTINUED | OUTPATIENT
Start: 2021-07-06 | End: 2021-07-08 | Stop reason: HOSPADM

## 2021-07-06 RX ORDER — SODIUM CHLORIDE, SODIUM LACTATE, POTASSIUM CHLORIDE, CALCIUM CHLORIDE 600; 310; 30; 20 MG/100ML; MG/100ML; MG/100ML; MG/100ML
INJECTION, SOLUTION INTRAVENOUS CONTINUOUS
Status: DISCONTINUED | OUTPATIENT
Start: 2021-07-06 | End: 2021-07-08 | Stop reason: HOSPADM

## 2021-07-06 RX ORDER — LIDOCAINE HYDROCHLORIDE 10 MG/ML
30 INJECTION, SOLUTION EPIDURAL; INFILTRATION; INTRACAUDAL; PERINEURAL ONCE
Status: DISCONTINUED | OUTPATIENT
Start: 2021-07-06 | End: 2021-07-08 | Stop reason: HOSPADM

## 2021-07-06 RX ORDER — TERBUTALINE SULFATE 1 MG/ML
0.25 INJECTION, SOLUTION SUBCUTANEOUS AS NEEDED
Status: DISCONTINUED | OUTPATIENT
Start: 2021-07-06 | End: 2021-07-08 | Stop reason: HOSPADM

## 2021-07-07 RX ORDER — HYDROXYZINE HYDROCHLORIDE 50 MG/ML
50 INJECTION, SOLUTION INTRAMUSCULAR ONCE
Status: COMPLETED | OUTPATIENT
Start: 2021-07-07 | End: 2021-07-07

## 2021-07-07 NOTE — PROGRESS NOTES
Pt is a 37year old female admitted to 377/377-A. No chief complaint on file. Pt is  37w1d intra-uterine pregnancy. History obtained, consents signed. Oriented to room, staff, and plan of care.

## 2021-07-07 NOTE — PROGRESS NOTES
7/7/2021, 10:06 AM    Subjective:  Patient is feeling cramping. Denies vb, lof. +FM. Denies ha, blurry vision, cp, sob, ruq pain.      Objective:   07/07/21  0700 07/07/21  0715 07/07/21  0900 07/07/21  0930   BP: 136/75 142/81 148/86 145/89   BP Loc

## 2021-07-07 NOTE — H&P
[address] Patient Status:  Inpatient    1985 MRN [id number]   Location [address] [facility] Attending Kate Willingham, [address] [facility] Day # 0 PCP Ranjith Lauren MD     Date of A work since she will have to stop work with             bleeding episodes.                          Had low bhcg on FTS      Rh negative state in antepartum period            1/18/21: Received Rhogam in ED for bleeding             WILL NEED RHOGAM AT 28 WEES Alert and Oriented  Abdomen: Soft, Gravid  Leopolds: 7#  Cervix 2/5/-3  BSUS: cephalic by US    761/EQZ/T  Irregular UCs    Lab Review:  Results for orders placed or performed during the hospital encounter of 57/28/43   Comp Metabolic Panel (14)   Result V Basophil % 0.2 %    Immature Granulocyte % 0.5 %        ASSESSMENT:    Patient is a  at 37w1d with mild preE    PLAN:    1. Admit  2. cefm/toco  3. GBS positive: start amp with labor  4. IOL: plan for cytotec til labor or max 8 doses.  Pitocin to fol

## 2021-07-08 ENCOUNTER — ANESTHESIA EVENT (OUTPATIENT)
Dept: OBGYN UNIT | Facility: HOSPITAL | Age: 36
End: 2021-07-08
Payer: COMMERCIAL

## 2021-07-08 ENCOUNTER — ANESTHESIA (OUTPATIENT)
Dept: OBGYN UNIT | Facility: HOSPITAL | Age: 36
End: 2021-07-08
Payer: COMMERCIAL

## 2021-07-08 PROBLEM — O36.8390 NON-REASSURING ELECTRONIC FETAL MONITORING TRACING: Status: ACTIVE | Noted: 2021-07-08

## 2021-07-08 PROBLEM — O36.8390 NON-REASSURING ELECTRONIC FETAL MONITORING TRACING (HCC): Status: ACTIVE | Noted: 2021-07-08

## 2021-07-08 LAB — FETAL SCREEN RESULT: NEGATIVE

## 2021-07-08 PROCEDURE — 59514 CESAREAN DELIVERY ONLY: CPT | Performed by: OBSTETRICS & GYNECOLOGY

## 2021-07-08 PROCEDURE — 3E0P7VZ INTRODUCTION OF HORMONE INTO FEMALE REPRODUCTIVE, VIA NATURAL OR ARTIFICIAL OPENING: ICD-10-PCS | Performed by: OBSTETRICS & GYNECOLOGY

## 2021-07-08 PROCEDURE — 59510 CESAREAN DELIVERY: CPT | Performed by: OBSTETRICS & GYNECOLOGY

## 2021-07-08 RX ORDER — SIMETHICONE 80 MG
80 TABLET,CHEWABLE ORAL 3 TIMES DAILY PRN
Status: DISCONTINUED | OUTPATIENT
Start: 2021-07-08 | End: 2021-07-12

## 2021-07-08 RX ORDER — NALBUPHINE HCL 10 MG/ML
2.5 AMPUL (ML) INJECTION EVERY 4 HOURS PRN
Status: ACTIVE | OUTPATIENT
Start: 2021-07-08 | End: 2021-07-09

## 2021-07-08 RX ORDER — NALOXONE HYDROCHLORIDE 0.4 MG/ML
0.08 INJECTION, SOLUTION INTRAMUSCULAR; INTRAVENOUS; SUBCUTANEOUS
Status: ACTIVE | OUTPATIENT
Start: 2021-07-08 | End: 2021-07-09

## 2021-07-08 RX ORDER — HYDROMORPHONE HYDROCHLORIDE 1 MG/ML
0.4 INJECTION, SOLUTION INTRAMUSCULAR; INTRAVENOUS; SUBCUTANEOUS
Status: ACTIVE | OUTPATIENT
Start: 2021-07-08 | End: 2021-07-09

## 2021-07-08 RX ORDER — DIPHENHYDRAMINE HYDROCHLORIDE 50 MG/ML
12.5 INJECTION INTRAMUSCULAR; INTRAVENOUS EVERY 4 HOURS PRN
Status: ACTIVE | OUTPATIENT
Start: 2021-07-08 | End: 2021-07-09

## 2021-07-08 RX ORDER — MORPHINE SULFATE 1 MG/ML
INJECTION, SOLUTION EPIDURAL; INTRATHECAL; INTRAVENOUS AS NEEDED
Status: DISCONTINUED | OUTPATIENT
Start: 2021-07-08 | End: 2021-07-08 | Stop reason: SURG

## 2021-07-08 RX ORDER — GABAPENTIN 300 MG/1
300 CAPSULE ORAL EVERY 8 HOURS PRN
Status: DISCONTINUED | OUTPATIENT
Start: 2021-07-08 | End: 2021-07-12

## 2021-07-08 RX ORDER — PROCHLORPERAZINE EDISYLATE 5 MG/ML
5 INJECTION INTRAMUSCULAR; INTRAVENOUS ONCE AS NEEDED
Status: ACTIVE | OUTPATIENT
Start: 2021-07-08 | End: 2021-07-08

## 2021-07-08 RX ORDER — BISACODYL 10 MG
10 SUPPOSITORY, RECTAL RECTAL
Status: DISCONTINUED | OUTPATIENT
Start: 2021-07-08 | End: 2021-07-12

## 2021-07-08 RX ORDER — KETOROLAC TROMETHAMINE 30 MG/ML
INJECTION, SOLUTION INTRAMUSCULAR; INTRAVENOUS
Status: COMPLETED
Start: 2021-07-08 | End: 2021-07-08

## 2021-07-08 RX ORDER — HYDROCODONE BITARTRATE AND ACETAMINOPHEN 7.5; 325 MG/1; MG/1
2 TABLET ORAL EVERY 6 HOURS PRN
Status: ACTIVE | OUTPATIENT
Start: 2021-07-08 | End: 2021-07-09

## 2021-07-08 RX ORDER — DIPHENHYDRAMINE HYDROCHLORIDE 50 MG/ML
25 INJECTION INTRAMUSCULAR; INTRAVENOUS ONCE AS NEEDED
Status: ACTIVE | OUTPATIENT
Start: 2021-07-08 | End: 2021-07-08

## 2021-07-08 RX ORDER — DEXTROSE, SODIUM CHLORIDE, SODIUM LACTATE, POTASSIUM CHLORIDE, AND CALCIUM CHLORIDE 5; .6; .31; .03; .02 G/100ML; G/100ML; G/100ML; G/100ML; G/100ML
INJECTION, SOLUTION INTRAVENOUS CONTINUOUS
Status: DISCONTINUED | OUTPATIENT
Start: 2021-07-08 | End: 2021-07-12

## 2021-07-08 RX ORDER — DEXAMETHASONE SODIUM PHOSPHATE 4 MG/ML
VIAL (ML) INJECTION AS NEEDED
Status: DISCONTINUED | OUTPATIENT
Start: 2021-07-08 | End: 2021-07-08 | Stop reason: SURG

## 2021-07-08 RX ORDER — LIDOCAINE HYDROCHLORIDE 10 MG/ML
INJECTION, SOLUTION EPIDURAL; INFILTRATION; INTRACAUDAL; PERINEURAL AS NEEDED
Status: DISCONTINUED | OUTPATIENT
Start: 2021-07-08 | End: 2021-07-08 | Stop reason: SURG

## 2021-07-08 RX ORDER — ENOXAPARIN SODIUM 100 MG/ML
30 INJECTION SUBCUTANEOUS EVERY 12 HOURS SCHEDULED
Status: DISCONTINUED | OUTPATIENT
Start: 2021-07-08 | End: 2021-07-12

## 2021-07-08 RX ORDER — POLYETHYLENE GLYCOL 3350 17 G/17G
17 POWDER, FOR SOLUTION ORAL DAILY PRN
Status: DISCONTINUED | OUTPATIENT
Start: 2021-07-08 | End: 2021-07-12

## 2021-07-08 RX ORDER — ONDANSETRON 2 MG/ML
4 INJECTION INTRAMUSCULAR; INTRAVENOUS EVERY 6 HOURS PRN
Status: DISCONTINUED | OUTPATIENT
Start: 2021-07-08 | End: 2021-07-12

## 2021-07-08 RX ORDER — FAMOTIDINE 20 MG/1
20 TABLET ORAL ONCE
Status: COMPLETED | OUTPATIENT
Start: 2021-07-08 | End: 2021-07-08

## 2021-07-08 RX ORDER — CEFAZOLIN SODIUM/WATER 2 G/20 ML
SYRINGE (ML) INTRAVENOUS
Status: DISPENSED
Start: 2021-07-08 | End: 2021-07-09

## 2021-07-08 RX ORDER — KETOROLAC TROMETHAMINE 30 MG/ML
30 INJECTION, SOLUTION INTRAMUSCULAR; INTRAVENOUS ONCE AS NEEDED
Status: COMPLETED | OUTPATIENT
Start: 2021-07-08 | End: 2021-07-08

## 2021-07-08 RX ORDER — CHOLECALCIFEROL (VITAMIN D3) 25 MCG
1 TABLET,CHEWABLE ORAL DAILY
Status: DISCONTINUED | OUTPATIENT
Start: 2021-07-08 | End: 2021-07-12

## 2021-07-08 RX ORDER — HALOPERIDOL 5 MG/ML
0.5 INJECTION INTRAMUSCULAR ONCE AS NEEDED
Status: ACTIVE | OUTPATIENT
Start: 2021-07-08 | End: 2021-07-08

## 2021-07-08 RX ORDER — CEFAZOLIN SODIUM/WATER 2 G/20 ML
2 SYRINGE (ML) INTRAVENOUS ONCE
Status: DISCONTINUED | OUTPATIENT
Start: 2021-07-08 | End: 2021-07-08 | Stop reason: HOSPADM

## 2021-07-08 RX ORDER — IBUPROFEN 600 MG/1
600 TABLET ORAL EVERY 6 HOURS
Status: DISCONTINUED | OUTPATIENT
Start: 2021-07-09 | End: 2021-07-12

## 2021-07-08 RX ORDER — ACETAMINOPHEN 500 MG
1000 TABLET ORAL EVERY 6 HOURS
Status: DISCONTINUED | OUTPATIENT
Start: 2021-07-08 | End: 2021-07-12

## 2021-07-08 RX ORDER — HYDROMORPHONE HYDROCHLORIDE 1 MG/ML
0.6 INJECTION, SOLUTION INTRAMUSCULAR; INTRAVENOUS; SUBCUTANEOUS
Status: ACTIVE | OUTPATIENT
Start: 2021-07-08 | End: 2021-07-09

## 2021-07-08 RX ORDER — ONDANSETRON 2 MG/ML
INJECTION INTRAMUSCULAR; INTRAVENOUS AS NEEDED
Status: DISCONTINUED | OUTPATIENT
Start: 2021-07-08 | End: 2021-07-08 | Stop reason: SURG

## 2021-07-08 RX ORDER — FAMOTIDINE 10 MG/ML
20 INJECTION, SOLUTION INTRAVENOUS ONCE
Status: COMPLETED | OUTPATIENT
Start: 2021-07-08 | End: 2021-07-08

## 2021-07-08 RX ORDER — METOCLOPRAMIDE HYDROCHLORIDE 5 MG/ML
10 INJECTION INTRAMUSCULAR; INTRAVENOUS ONCE
Status: COMPLETED | OUTPATIENT
Start: 2021-07-08 | End: 2021-07-08

## 2021-07-08 RX ORDER — AMMONIA INHALANTS 0.04 G/.3ML
0.3 INHALANT RESPIRATORY (INHALATION) AS NEEDED
Status: DISCONTINUED | OUTPATIENT
Start: 2021-07-08 | End: 2021-07-12

## 2021-07-08 RX ORDER — KETOROLAC TROMETHAMINE 30 MG/ML
30 INJECTION, SOLUTION INTRAMUSCULAR; INTRAVENOUS EVERY 6 HOURS
Status: COMPLETED | OUTPATIENT
Start: 2021-07-08 | End: 2021-07-09

## 2021-07-08 RX ORDER — METOCLOPRAMIDE HYDROCHLORIDE 5 MG/ML
INJECTION INTRAMUSCULAR; INTRAVENOUS
Status: COMPLETED
Start: 2021-07-08 | End: 2021-07-08

## 2021-07-08 RX ORDER — BUPIVACAINE HYDROCHLORIDE 7.5 MG/ML
INJECTION, SOLUTION INTRASPINAL AS NEEDED
Status: DISCONTINUED | OUTPATIENT
Start: 2021-07-08 | End: 2021-07-08 | Stop reason: SURG

## 2021-07-08 RX ORDER — DIPHENHYDRAMINE HCL 25 MG
25 CAPSULE ORAL EVERY 4 HOURS PRN
Status: ACTIVE | OUTPATIENT
Start: 2021-07-08 | End: 2021-07-09

## 2021-07-08 RX ORDER — SODIUM CHLORIDE, SODIUM LACTATE, POTASSIUM CHLORIDE, CALCIUM CHLORIDE 600; 310; 30; 20 MG/100ML; MG/100ML; MG/100ML; MG/100ML
INJECTION, SOLUTION INTRAVENOUS CONTINUOUS
Status: DISCONTINUED | OUTPATIENT
Start: 2021-07-08 | End: 2021-07-12

## 2021-07-08 RX ORDER — FAMOTIDINE 10 MG/ML
INJECTION, SOLUTION INTRAVENOUS
Status: COMPLETED
Start: 2021-07-08 | End: 2021-07-08

## 2021-07-08 RX ORDER — TRISODIUM CITRATE DIHYDRATE AND CITRIC ACID MONOHYDRATE 500; 334 MG/5ML; MG/5ML
SOLUTION ORAL
Status: COMPLETED
Start: 2021-07-08 | End: 2021-07-08

## 2021-07-08 RX ORDER — HYDROCODONE BITARTRATE AND ACETAMINOPHEN 7.5; 325 MG/1; MG/1
1 TABLET ORAL EVERY 6 HOURS PRN
Status: ACTIVE | OUTPATIENT
Start: 2021-07-08 | End: 2021-07-09

## 2021-07-08 RX ORDER — ONDANSETRON 2 MG/ML
4 INJECTION INTRAMUSCULAR; INTRAVENOUS ONCE AS NEEDED
Status: ACTIVE | OUTPATIENT
Start: 2021-07-08 | End: 2021-07-08

## 2021-07-08 RX ORDER — DOCUSATE SODIUM 100 MG/1
100 CAPSULE, LIQUID FILLED ORAL
Status: DISCONTINUED | OUTPATIENT
Start: 2021-07-08 | End: 2021-07-12

## 2021-07-08 RX ORDER — METOCLOPRAMIDE 10 MG/1
10 TABLET ORAL ONCE
Status: COMPLETED | OUTPATIENT
Start: 2021-07-08 | End: 2021-07-08

## 2021-07-08 RX ORDER — NALBUPHINE HCL 10 MG/ML
2.5 AMPUL (ML) INJECTION
Status: DISCONTINUED | OUTPATIENT
Start: 2021-07-08 | End: 2021-07-08

## 2021-07-08 RX ORDER — ACETAMINOPHEN 325 MG/1
650 TABLET ORAL EVERY 6 HOURS PRN
Status: DISPENSED | OUTPATIENT
Start: 2021-07-08 | End: 2021-07-09

## 2021-07-08 RX ORDER — MISOPROSTOL 200 UG/1
1000 TABLET ORAL ONCE AS NEEDED
Status: ACTIVE | OUTPATIENT
Start: 2021-07-08 | End: 2021-07-08

## 2021-07-08 RX ORDER — EPHEDRINE SULFATE 50 MG/ML
INJECTION INTRAVENOUS AS NEEDED
Status: DISCONTINUED | OUTPATIENT
Start: 2021-07-08 | End: 2021-07-08 | Stop reason: SURG

## 2021-07-08 RX ADMIN — LIDOCAINE HYDROCHLORIDE 3 ML: 10 INJECTION, SOLUTION EPIDURAL; INFILTRATION; INTRACAUDAL; PERINEURAL at 14:56:00

## 2021-07-08 RX ADMIN — SODIUM CHLORIDE, SODIUM LACTATE, POTASSIUM CHLORIDE, CALCIUM CHLORIDE: 600; 310; 30; 20 INJECTION, SOLUTION INTRAVENOUS at 15:14:00

## 2021-07-08 RX ADMIN — DEXAMETHASONE SODIUM PHOSPHATE 8 MG: 4 MG/ML VIAL (ML) INJECTION at 15:02:00

## 2021-07-08 RX ADMIN — MORPHINE SULFATE 0.3 MG: 1 INJECTION, SOLUTION EPIDURAL; INTRATHECAL; INTRAVENOUS at 14:58:00

## 2021-07-08 RX ADMIN — BUPIVACAINE HYDROCHLORIDE 1.5 ML: 7.5 INJECTION, SOLUTION INTRASPINAL at 14:58:00

## 2021-07-08 RX ADMIN — ONDANSETRON 4 MG: 2 INJECTION INTRAMUSCULAR; INTRAVENOUS at 15:02:00

## 2021-07-08 RX ADMIN — EPHEDRINE SULFATE 10 MG: 50 INJECTION INTRAVENOUS at 15:02:00

## 2021-07-08 RX ADMIN — SODIUM CHLORIDE, SODIUM LACTATE, POTASSIUM CHLORIDE, CALCIUM CHLORIDE: 600; 310; 30; 20 INJECTION, SOLUTION INTRAVENOUS at 15:40:00

## 2021-07-08 NOTE — PROGRESS NOTES
7/8/2021, 12:09 PM    Subjective:    Having intermittent late decels since receiving nubain/vistral.   Received 8 doses of cytotec    Objective:   07/07/21  2145 07/08/21  0100 07/08/21  0130 07/08/21  0430   BP: 130/69 154/85 (!) 146/92 158/78   BP Locati

## 2021-07-08 NOTE — LACTATION NOTE
LACTATION NOTE - MOTHER      Evaluation Type: Inpatient    Problems identified  Problems identified: Knowledge deficit;Milk supply not WNL  Milk supply not WNL: Reduced (potential)  Problems Identified Other: baby is not latching    Maternal history  Mater

## 2021-07-08 NOTE — ANESTHESIA PREPROCEDURE EVALUATION
Anesthesia PreOp Note    HPI:     Queen Sherman is a 37year old female who presents for preoperative consultation requested by: Constanza Moreno DO    Date of Surgery: 2021    Procedure(s):   SECTION  Indication: * No pre-op diagnosis enter (ANCEF/KEFZOL) 2 GM/20ML premix IV syringe 2 g, 2 g, Intravenous, Once, Andrew Nolasco, DO  ceFAZolin sodium (ANCEF/KEFZOL) 2 GM/20ML premix IV syringe, , , ,   dextrose 5 % /lactated ringers infusion, , Intravenous, PRN, Zack Goldberg, DO, Last Rat file.        Compazine [Prochlor*        Family History   Problem Relation Age of Onset   • Other (Other) Mother         hysterectomy-menorrhagia   • Cancer Maternal Aunt         Dx with breast cancer in her 42's and passed in 52's.      Social History    S 07/06/2021    MCV 90.9 07/06/2021    MCH 29.7 07/06/2021    MCHC 32.7 07/06/2021    RDW 13.2 07/06/2021    .0 07/06/2021     Lab Results   Component Value Date     07/06/2021    K 3.7 07/06/2021     07/06/2021    CO2 23.0 07/06/2021    B relevant, major complications, and any alternative forms of anesthetic management. All of the patient's questions were answered to the best of my ability. The patient desires the anesthetic management as planned.   Marquis Jang  7/8/2021 2:44 PM

## 2021-07-08 NOTE — ANESTHESIA PROCEDURE NOTES
Spinal Block  Performed by: Hiram Lim MD  Authorized by: Hiram Lim MD       General Information and Staff    Start Time:  7/8/2021 2:50 PM  End Time:  7/8/2021 2:55 PM  Anesthesiologist:  Hiram Lim MD  Performed by:   Anesthesiologist  Brendon Fisher

## 2021-07-08 NOTE — PROGRESS NOTES
[facility]     Section Delivery / Brief Operative Note    Andrés Hudson Patient Status:  Inpatient    1985 MRN [id number]   Location [facility] Attending Lenka Johnston, [address] [facility] Day # 2 PCP

## 2021-07-08 NOTE — PROGRESS NOTES
7/7/2021, 9:56 PM    Subjective:    Received 7 doses of cytotec.   Starting to feel stronger ctx    Objective:   07/07/21  1330 07/07/21  1400 07/07/21  1715 07/07/21  2145   BP: 146/79 136/73 145/69 130/69   BP Location:   Left arm Left arm   Pulse: 63 71

## 2021-07-08 NOTE — DISCHARGE SUMMARY
[facility]    Discharge Summary    Lashay Proper Patient Status:  Inpatient    1985 MRN [id number]   Location [address] Attending Kishor Betancourt, 160[address] [facility] Day # 5       Admit date:  2021    D with self care    Plan:     Follow-up appointment in 6 weeks with  [id number]

## 2021-07-08 NOTE — PROGRESS NOTES
[facility]    Labor Progress Note    Tara Bedolla Patient Status:  Inpatient    1985 MRN [id number]   Location [address] Attending Daniel Chadwick, [address] [facility] Day # 2 PCP MD Tita Hopkins

## 2021-07-08 NOTE — LACTATION NOTE
This note was copied from a baby's chart.   LACTATION NOTE - INFANT    Evaluation Type  Evaluation Type: Inpatient    Problems & Assessment  Problems Diagnosed or Identified: Latch difficulty;Sleepy  Problems: comment/detail: fetal distress  Infant Assessme

## 2021-07-09 LAB
BASOPHILS # BLD AUTO: 0.02 X10(3) UL (ref 0–0.2)
BASOPHILS NFR BLD AUTO: 0.2 %
DEPRECATED RDW RBC AUTO: 43.2 FL (ref 35.1–46.3)
EOSINOPHIL # BLD AUTO: 0.02 X10(3) UL (ref 0–0.7)
EOSINOPHIL NFR BLD AUTO: 0.2 %
ERYTHROCYTE [DISTWIDTH] IN BLOOD BY AUTOMATED COUNT: 13.2 % (ref 11–15)
HCT VFR BLD AUTO: 28.1 %
HGB BLD-MCNC: 9.4 G/DL
IMM GRANULOCYTES # BLD AUTO: 0.07 X10(3) UL (ref 0–1)
IMM GRANULOCYTES NFR BLD: 0.5 %
LYMPHOCYTES # BLD AUTO: 1.4 X10(3) UL (ref 1–4)
LYMPHOCYTES NFR BLD AUTO: 10.5 %
MCH RBC QN AUTO: 29.9 PG (ref 26–34)
MCHC RBC AUTO-ENTMCNC: 33.5 G/DL (ref 31–37)
MCV RBC AUTO: 89.5 FL
MONOCYTES # BLD AUTO: 1.35 X10(3) UL (ref 0.1–1)
MONOCYTES NFR BLD AUTO: 10.2 %
NEUTROPHILS # BLD AUTO: 10.42 X10 (3) UL (ref 1.5–7.7)
NEUTROPHILS # BLD AUTO: 10.42 X10(3) UL (ref 1.5–7.7)
NEUTROPHILS NFR BLD AUTO: 78.4 %
PLATELET # BLD AUTO: 179 10(3)UL (ref 150–450)
RBC # BLD AUTO: 3.14 X10(6)UL
WBC # BLD AUTO: 13.3 X10(3) UL (ref 4–11)

## 2021-07-09 NOTE — PROGRESS NOTES
[facility] - [facility]    OB/GYNE Progress Note      Demetri Wilson Patient Status:  Inpatient    1985 MRN [id number]   Location [facility] 3SE Attending Isidro Morning, DO   Hosp Day # 3 PCP Yoon Art MD       Subjective     Denmelodie Manus

## 2021-07-09 NOTE — PLAN OF CARE
Problem: Patient Centered Care  Goal: Patient preferences are identified and integrated in the patient's plan of care  Description: Interventions:  - Provide timely, complete, and accurate information to patient/family  - Incorporate patient and family k (e.g., rooting, lip smacking, sucking fingers/hand) versus late cue of crying.  - Discuss/demonstrate breast feeding aids (e.g., infant sling, nursing footstool/pillows, and breast pumps).   - Encourage mother/other family members to express feelings/concer

## 2021-07-09 NOTE — ANESTHESIA POST-OP FOLLOW-UP NOTE
S/p c/s IT Duramorph  POD # 1, doing well  No HA no BA no new neurologic signs ir symptoms  Site is clean dry intact  Discharged from service

## 2021-07-09 NOTE — OPERATIVE REPORT
[facility]    PATIENT'S NAME: [facility]   ATTENDING PHYSICIAN: Israel Curry DO   OPERATING PHYSICIAN: Jessica Thacker.  DO Ayo   PATIENT ACCOUNT#:   [de-identified]    LOCATION:  [address] [place] [address]. [place] [address] #:   [id number]       DATE OF BIR bluntly as well. The vesicouterine peritoneal reflection was identified, incised, and carried bilaterally. Bladder flap was developed with sharp and blunt dissection.   A second knife was used to make a low-transverse uterine incision, carried down to the followed with skin closure using a running subcuticular suture of 4-0 Monocryl. All final counts were noted to be correct. The patient was then covered with Steri-Strips and a sterile dressing and taken to postanesthesia care unit in stable condition.

## 2021-07-09 NOTE — PROGRESS NOTES
Received patient into room 368 via bed. Bedside shift report received from [facility]. Pt transferred to bed. Bed in lowest and locked position. Side rails up x2. VSS. IV site unremarkable. Baby present in open crib. ID bands matched with L&D RN.   Patient

## 2021-07-10 RX ORDER — HYDROCODONE BITARTRATE AND ACETAMINOPHEN 5; 325 MG/1; MG/1
1 TABLET ORAL EVERY 6 HOURS PRN
Status: DISCONTINUED | OUTPATIENT
Start: 2021-07-10 | End: 2021-07-10

## 2021-07-10 NOTE — PLAN OF CARE
Problem: POSTPARTUM  Goal: Long Term Goal:Experiences normal postpartum course  Description: INTERVENTIONS:  - Assess and monitor vital signs and lab values. - Assess fundus and lochia. - Provide ice/sitz baths for perineum discomfort.   - Monitor heali for signs of nipple pain/trauma. - Instruct and provide assistance with proper latch. - Review techniques for milk expression (breast pumping) and storage of breast milk. Provide pumping equipment/supplies, instructions and assistance, as needed.   Sveta Bedolla ordered and tolerated  - Evaluate effectiveness of GI medications  - Encourage mobilization and activity  - Obtain nutritional consult as needed  - Establish a toileting routine/schedule  - Consider collaborating with pharmacy to review patient's medicatio

## 2021-07-10 NOTE — LACTATION NOTE
This note was copied from a baby's chart. LACTATION NOTE - INFANT    Evaluation Type  Evaluation Type: Inpatient    Problems & Assessment  Problems Diagnosed or Identified: Sleepy; Shallow latch  Problems: comment/detail: fetal distress  Infant Assessment:

## 2021-07-10 NOTE — PROGRESS NOTES
[facility]    OB/GYNE Progress Note      Benji Downy Patient Status:  Inpatient    1985 MRN [id number]   Location [facility] 3SE Attending Og Lemos, DO   Hosp Day # 4 PCP Danette Mccormick MD       Subjective     Alex Puga

## 2021-07-11 ENCOUNTER — TELEPHONE (OUTPATIENT)
Dept: OBGYN CLINIC | Facility: CLINIC | Age: 36
End: 2021-07-11

## 2021-07-11 RX ORDER — IBUPROFEN 600 MG/1
600 TABLET ORAL EVERY 6 HOURS PRN
Qty: 30 TABLET | Refills: 0 | Status: SHIPPED | OUTPATIENT
Start: 2021-07-11

## 2021-07-11 RX ORDER — LABETALOL 200 MG/1
200 TABLET, FILM COATED ORAL EVERY 12 HOURS SCHEDULED
Status: DISCONTINUED | OUTPATIENT
Start: 2021-07-11 | End: 2021-07-12

## 2021-07-11 RX ORDER — PSEUDOEPHEDRINE HCL 30 MG
100 TABLET ORAL 2 TIMES DAILY PRN
Qty: 30 CAPSULE | Refills: 1 | Status: SHIPPED | OUTPATIENT
Start: 2021-07-11 | End: 2021-08-27

## 2021-07-11 NOTE — PROGRESS NOTES
[facility]    OB/Gyne Post  Section Progress Note      Christen Mendoza Patient Status:  Inpatient    1985 MRN [id number]   Location [facility] 3SE Attending Lori Garcia, [address] [facility] Day # 5 PCP Bonita Delgado MD

## 2021-07-12 VITALS
RESPIRATION RATE: 16 BRPM | OXYGEN SATURATION: 100 % | SYSTOLIC BLOOD PRESSURE: 136 MMHG | HEART RATE: 89 BPM | HEIGHT: 62 IN | TEMPERATURE: 98 F | DIASTOLIC BLOOD PRESSURE: 69 MMHG | WEIGHT: 256 LBS | BODY MASS INDEX: 47.11 KG/M2

## 2021-07-12 RX ORDER — LABETALOL 200 MG/1
200 TABLET, FILM COATED ORAL 2 TIMES DAILY
Qty: 30 TABLET | Refills: 0 | Status: SHIPPED | OUTPATIENT
Start: 2021-07-12 | End: 2021-11-16 | Stop reason: ALTCHOICE

## 2021-07-12 NOTE — LACTATION NOTE
This note was copied from a baby's chart.   LACTATION NOTE - INFANT         Problems & Assessment  Problems Diagnosed or Identified: HMZHLI;18-24 weeks gestation  Infant Assessment: Minimal hunger cues present;Skin color: pink or appropriate for ethnicity

## 2021-07-12 NOTE — TELEPHONE ENCOUNTER
FMLA and disab updated with current ovn, completed and faxed to [facility] [phone].  Sent pt [facility] message

## 2021-07-12 NOTE — PROGRESS NOTES
[facility]    OB/Gyne Post  Section Progress Note      Rocío Hargrove Patient Status:  Inpatient    1985 MRN [id number]   Location [facility] 3SE Attending Andres Ortega, [address] [facility] Day # 6 PCP Lindy Amato MD

## 2021-07-13 NOTE — PAYOR COMM NOTE
--------------  DISCHARGE REVIEW    Payor: Dorinda CLARKE [employer]  Subscriber #:  [id number]  Authorization Number: [id number]    Admit date: 7/6/21  Admit time:   6:37 PM  Discharge Date: 7/12/2021 12:30 PM     Admitting Physician: Courtney Lewis not yet in labor. Hospital Course: Primary  section. No complications.  Routine delivery and postpartum care    Discharge Physical Exam:   /77 (BP Location: Left arm)   Pulse 72   Temp 97.6 °F (36.4 °C) (Oral)   Resp 18   Ht 5' 2\" (1.57

## 2021-07-13 NOTE — PAYOR COMM NOTE
--------------  ADMISSION REVIEW     Payor: Shaila CLARKE [employer]  Subscriber #:  [id number]  Authorization Number: [id number]    Admit date: 7/6/21  Admit time:  6:37 PM       Admitting Physician: Lori Garcia DO  Attending Physician: 5/5/21--  Fibroids 9.5 cm, 5.4 cm, 4 cm            3/10/21--  Fibroids  10cm, 4.6cm, 3.2cm.              01-20-21  MFM FTS shows two fibroids: 75 mm on            right and 25 mm anteriorly.                    Vaginal bleeding in pregnancy, second trimeste times daily, Disp: 30 tablet, Rfl: 1  prenatal multivitamin plus DHA 27-0.8-228 MG Oral Cap, Take 1 capsule by mouth daily.   , Disp: , Rfl:   Albuterol Sulfate  (90 Base) MCG/ACT Inhalation Aero Soln, Inhale 2 puffs into the lungs every 6 (six) hour MCH 29.7 26.0 - 34.0 pg    MCHC 32.7 31.0 - 37.0 g/dL    RDW-SD 43.9 35.1 - 46.3 fL    RDW 13.2 11.0 - 15.0 %    .0 150.0 - 450.0 10(3)uL    Neutrophil Absolute Prelim 6.97 1.50 - 7.70 x10 (3) uL    Neutrophil Absolute 6.97 1.50 - 7.70 x10(3) uL minutes respectively.   There was no nuchal cord and clear fluid was noted.     OPERATIVE TECHNIQUE:  After consent was obtained, patient was taken to the operating suite where a spinal anesthetic was administered, and the patient was then placed in the sup incision, swabbed the uterine cavity of any excess clot and membranous fragments, and the uterus was closed with a running lock suture of #1 chromic. A second layer was placed in an imbricating fashion, and no bleeding was noted.   We then removed the self bed        Results:          Recent Labs     07/09/21  0556   WBC 13.3*   HGB 9.4*   HCT 28.1*         No results found.     Assessment/Plan   POD 2: Discussed increased ambulation. Bowel stim if necessary. Anticipate DC tomorrow.      Andrew Nolasco, DO

## 2021-07-14 ENCOUNTER — NURSE ONLY (OUTPATIENT)
Dept: OBGYN CLINIC | Facility: CLINIC | Age: 36
End: 2021-07-14
Payer: COMMERCIAL

## 2021-07-14 VITALS — SYSTOLIC BLOOD PRESSURE: 122 MMHG | HEART RATE: 84 BPM | DIASTOLIC BLOOD PRESSURE: 90 MMHG

## 2021-07-14 DIAGNOSIS — Z01.30 BLOOD PRESSURE CHECK: Primary | ICD-10-CM

## 2021-07-14 PROCEDURE — 3074F SYST BP LT 130 MM HG: CPT | Performed by: OBSTETRICS & GYNECOLOGY

## 2021-07-14 PROCEDURE — 3080F DIAST BP >= 90 MM HG: CPT | Performed by: OBSTETRICS & GYNECOLOGY

## 2021-07-14 NOTE — PROGRESS NOTES
Pt is 6 days s/p c-sec and presents to clinic today for B/P check due to preeclampsia. Pt is taking labetalol 200 mg BID. B/P log verbal report 138/86, 136/88, 134/82, 140/22, 136/88. Pt reports slight HA and bilateral edema noted to feet.  No swelling t

## 2021-07-26 ENCOUNTER — TELEPHONE (OUTPATIENT)
Dept: OBGYN UNIT | Facility: HOSPITAL | Age: 36
End: 2021-07-26

## 2021-07-26 NOTE — PROGRESS NOTES
Reviewed self and infant care with mom, she verbalizes understanding of instruction reviewed. Encouraged to follow up with MD's as directed with questions/concerns.      Reviewed parameters for blood pressure and when to notify md.

## 2021-07-30 NOTE — ANESTHESIA POSTPROCEDURE EVALUATION
Patient: Felipa David    Procedure Summary     Date: 21 Room / Location: [address] [place] [address] L+D OR  [facility] L+D OR    Anesthesia Start:  Anesthesia Stop: 8274    Procedure:  SECTION (N/A ) Diagnosis:     Surgeons: Dalila Fears, DO Anesthesiologis Rifampin Counseling: I discussed with the patient the risks of rifampin including but not limited to liver damage, kidney damage, red-orange body fluids, nausea/vomiting and severe allergy.

## 2021-08-27 ENCOUNTER — POSTPARTUM (OUTPATIENT)
Dept: OBGYN CLINIC | Facility: CLINIC | Age: 36
End: 2021-08-27
Payer: COMMERCIAL

## 2021-08-27 VITALS
BODY MASS INDEX: 43 KG/M2 | HEART RATE: 80 BPM | DIASTOLIC BLOOD PRESSURE: 84 MMHG | WEIGHT: 234.19 LBS | SYSTOLIC BLOOD PRESSURE: 128 MMHG

## 2021-08-27 PROBLEM — O14.93 PRE-ECLAMPSIA IN THIRD TRIMESTER (HCC): Status: RESOLVED | Noted: 2021-07-02 | Resolved: 2021-08-27

## 2021-08-27 PROBLEM — O46.92 VAGINAL BLEEDING IN PREGNANCY, SECOND TRIMESTER (HCC): Status: RESOLVED | Noted: 2021-02-15 | Resolved: 2021-08-27

## 2021-08-27 PROBLEM — O12.13 PROTEINURIA AFFECTING PREGNANCY IN THIRD TRIMESTER (HCC): Status: RESOLVED | Noted: 2021-07-01 | Resolved: 2021-08-27

## 2021-08-27 PROBLEM — O36.8390 NON-REASSURING ELECTRONIC FETAL MONITORING TRACING (HCC): Status: RESOLVED | Noted: 2021-07-08 | Resolved: 2021-08-27

## 2021-08-27 PROBLEM — O46.92 VAGINAL BLEEDING IN PREGNANCY, SECOND TRIMESTER: Status: RESOLVED | Noted: 2021-02-15 | Resolved: 2021-08-27

## 2021-08-27 PROBLEM — O26.899 RH NEGATIVE STATE IN ANTEPARTUM PERIOD (HCC): Status: RESOLVED | Noted: 2021-01-20 | Resolved: 2021-08-27

## 2021-08-27 PROBLEM — O34.10 LEIOMYOMA OF UTERUS AFFECTING PREGNANCY, ANTEPARTUM (HCC): Status: RESOLVED | Noted: 2021-02-15 | Resolved: 2021-08-27

## 2021-08-27 PROBLEM — Z67.91 RH NEGATIVE STATE IN ANTEPARTUM PERIOD (HCC): Status: RESOLVED | Noted: 2021-01-20 | Resolved: 2021-08-27

## 2021-08-27 PROBLEM — D25.9 LEIOMYOMA OF UTERUS AFFECTING PREGNANCY, ANTEPARTUM (HCC): Status: RESOLVED | Noted: 2021-02-15 | Resolved: 2021-08-27

## 2021-08-27 PROBLEM — O34.10 LEIOMYOMA OF UTERUS AFFECTING PREGNANCY, ANTEPARTUM: Status: RESOLVED | Noted: 2021-02-15 | Resolved: 2021-08-27

## 2021-08-27 PROBLEM — O14.90 PREECLAMPSIA: Status: RESOLVED | Noted: 2021-07-06 | Resolved: 2021-08-27

## 2021-08-27 PROBLEM — D25.9 LEIOMYOMA OF UTERUS AFFECTING PREGNANCY, ANTEPARTUM: Status: RESOLVED | Noted: 2021-02-15 | Resolved: 2021-08-27

## 2021-08-27 PROBLEM — O26.899 RH NEGATIVE STATE IN ANTEPARTUM PERIOD: Status: RESOLVED | Noted: 2021-01-20 | Resolved: 2021-08-27

## 2021-08-27 PROBLEM — Z67.91 RH NEGATIVE STATE IN ANTEPARTUM PERIOD: Status: RESOLVED | Noted: 2021-01-20 | Resolved: 2021-08-27

## 2021-08-27 PROBLEM — O14.93 PRE-ECLAMPSIA IN THIRD TRIMESTER: Status: RESOLVED | Noted: 2021-07-02 | Resolved: 2021-08-27

## 2021-08-27 PROBLEM — O14.90 PREECLAMPSIA (HCC): Status: RESOLVED | Noted: 2021-07-06 | Resolved: 2021-08-27

## 2021-08-27 PROBLEM — O12.13 PROTEINURIA AFFECTING PREGNANCY IN THIRD TRIMESTER: Status: RESOLVED | Noted: 2021-07-01 | Resolved: 2021-08-27

## 2021-08-27 PROBLEM — O36.8390 NON-REASSURING ELECTRONIC FETAL MONITORING TRACING: Status: RESOLVED | Noted: 2021-07-08 | Resolved: 2021-08-27

## 2021-08-27 PROCEDURE — 3074F SYST BP LT 130 MM HG: CPT | Performed by: OBSTETRICS & GYNECOLOGY

## 2021-08-27 PROCEDURE — 3079F DIAST BP 80-89 MM HG: CPT | Performed by: OBSTETRICS & GYNECOLOGY

## 2021-08-29 RX ORDER — MISOPROSTOL 200 UG/1
400 TABLET ORAL ONCE
Qty: 2 TABLET | Refills: 0 | Status: SHIPPED | OUTPATIENT
Start: 2021-08-29 | End: 2021-08-29

## 2021-08-30 NOTE — PROGRESS NOTES
HPI    Franny Rogers is a 31year old female  here for 6 week post-partum visit. Patient delivered a  male infant on [address]. Patient unsure on method for contraception. Patient is bottle feeding since 3 weeks PP.    Patient denies symp distribution, and no lesions  Vagina:  Normal appearance without lesions, no abnormal discharge  Cervix:  Normal without tenderness on motion  Uterus: normal in size, contour, position, mobility, without tenderness  Adnexa: normal without masses or tendern

## 2021-09-21 ENCOUNTER — PATIENT MESSAGE (OUTPATIENT)
Dept: OBGYN CLINIC | Facility: CLINIC | Age: 36
End: 2021-09-21

## 2021-09-21 NOTE — TELEPHONE ENCOUNTER
From: Marlys Erickson  To: Dionicio Etienne DO  Sent: 9/21/2021 12:24 PM CDT  Subject: Return to work letter    Hello,  Can I please have a letter to be cleared to return back to work effective 9/28/2021? The letter can be sent via [employer]. Thank you.

## 2021-11-08 ENCOUNTER — TELEPHONE (OUTPATIENT)
Dept: INTERNAL MEDICINE CLINIC | Facility: HOSPITAL | Age: 36
End: 2021-11-08

## 2021-11-08 DIAGNOSIS — Z20.822 SUSPECTED COVID-19 VIRUS INFECTION: Primary | ICD-10-CM

## 2021-11-08 NOTE — TELEPHONE ENCOUNTER
Results and RTW guidelines:    COVID RESULT:    [] Viewed by employee in [address] RTW plan and instructions as indicated on triage call. Manager notified. Estimated RTW date:   [x] Discussed with employee   [] Unable to reach by phone.   Sent via [employer] worsen                - If outside testing completed, bring a copy of result to RTW appointment      Notes: Fully Vaccinated, Tested Strep Neg.   Give the [facility] \" another day to heal, before going to PCP or IC.    RTW PLAN:    [] RTW 10 days with clearanc Yes []          • Cough                          Yes [x]      • Shortness of breath  Yes []      • Congestion                 Yes [x]      • Runny nose                Yes [x]        • Loss of Smell              Yes []       •  Loss of T persistent symptoms or high suspicion, repeat with [facility] in 1-2 days.                No- Follow below guidelines-     [] High risk non-work exposure (includes residing with known COVID-19 positive individual):     [] Asymptomatic and NOT vaccinated and no result in [address] and remains fever, vomiting, and diarrhea free x 24 hours  []  May continue to work if remains asymptomatic and views negative result in [address]  [x]  Plan noted above  [x]  Length of time to obtain results  [x]  Quarantine instructions

## 2021-11-09 ENCOUNTER — NURSE ONLY (OUTPATIENT)
Dept: LAB | Facility: HOSPITAL | Age: 36
End: 2021-11-09
Attending: PREVENTIVE MEDICINE

## 2021-11-09 DIAGNOSIS — Z20.822 SUSPECTED COVID-19 VIRUS INFECTION: ICD-10-CM

## 2021-11-16 ENCOUNTER — OFFICE VISIT (OUTPATIENT)
Dept: FAMILY MEDICINE CLINIC | Facility: CLINIC | Age: 36
End: 2021-11-16
Payer: COMMERCIAL

## 2021-11-16 VITALS
SYSTOLIC BLOOD PRESSURE: 130 MMHG | DIASTOLIC BLOOD PRESSURE: 84 MMHG | RESPIRATION RATE: 17 BRPM | HEART RATE: 72 BPM | BODY MASS INDEX: 42.58 KG/M2 | WEIGHT: 231.38 LBS | HEIGHT: 62 IN | TEMPERATURE: 97 F

## 2021-11-16 DIAGNOSIS — Z00.00 ANNUAL PHYSICAL EXAM: Primary | ICD-10-CM

## 2021-11-16 PROCEDURE — 3075F SYST BP GE 130 - 139MM HG: CPT | Performed by: FAMILY MEDICINE

## 2021-11-16 PROCEDURE — 3079F DIAST BP 80-89 MM HG: CPT | Performed by: FAMILY MEDICINE

## 2021-11-16 PROCEDURE — 99395 PREV VISIT EST AGE 18-39: CPT | Performed by: FAMILY MEDICINE

## 2021-11-16 PROCEDURE — 3008F BODY MASS INDEX DOCD: CPT | Performed by: FAMILY MEDICINE

## 2021-11-16 RX ORDER — TRIAMCINOLONE ACETONIDE 0.25 MG/G
CREAM TOPICAL 2 TIMES DAILY
COMMUNITY
End: 2021-11-16

## 2021-11-16 RX ORDER — VALACYCLOVIR HYDROCHLORIDE 1 G/1
1000 TABLET, FILM COATED ORAL EVERY 12 HOURS SCHEDULED
Qty: 30 TABLET | Refills: 3 | Status: SHIPPED | OUTPATIENT
Start: 2021-11-16

## 2021-11-16 RX ORDER — ALBUTEROL SULFATE 90 UG/1
2 AEROSOL, METERED RESPIRATORY (INHALATION) EVERY 6 HOURS PRN
Qty: 1 EACH | Refills: 0 | Status: SHIPPED | OUTPATIENT
Start: 2021-11-16 | End: 2021-12-21

## 2021-11-16 RX ORDER — VALACYCLOVIR HYDROCHLORIDE 1 G/1
TABLET, FILM COATED ORAL EVERY 12 HOURS SCHEDULED
COMMUNITY
End: 2021-11-16

## 2021-11-16 RX ORDER — MISOPROSTOL 200 UG/1
TABLET ORAL
COMMUNITY
Start: 2021-08-30

## 2021-11-16 RX ORDER — TRIAMCINOLONE ACETONIDE 0.25 MG/G
1 CREAM TOPICAL 2 TIMES DAILY
Qty: 80 G | Refills: 0 | Status: SHIPPED | OUTPATIENT
Start: 2021-11-16

## 2021-11-16 NOTE — PROGRESS NOTES
HPI:    Orion Rodríguez is a 31year old female presents to clinic for annual physical exam.  Had a baby boy 4 months ago! Adjusting well to motherhood. No acute concerns. Normal appetite, tries eat healthy foods.   Normal bowel movements and urination No sig reported) 1 each 0       Allergies:    Compazine [Prochlor*          Depression Screening (PHQ-2/PHQ-9): Over the LAST 2 WEEKS                         ROS:   Review of Systems   All other systems reviewed and are negative.       PHYSICAL EXAM:      1 08/05/2022    Follow up in 1 year or sooner if needed           Responsible party/patient verbalized understanding of information discussed. No barriers to learning observed.           Orders This Visit:  Orders Placed This Encounter      ALLYN Bonner

## 2021-11-17 ENCOUNTER — LAB ENCOUNTER (OUTPATIENT)
Dept: LAB | Facility: HOSPITAL | Age: 36
End: 2021-11-17
Attending: FAMILY MEDICINE
Payer: COMMERCIAL

## 2021-11-17 DIAGNOSIS — Z00.00 ANNUAL PHYSICAL EXAM: ICD-10-CM

## 2021-11-17 PROCEDURE — 85025 COMPLETE CBC W/AUTO DIFF WBC: CPT

## 2021-11-17 PROCEDURE — 80053 COMPREHEN METABOLIC PANEL: CPT

## 2021-11-17 PROCEDURE — 84443 ASSAY THYROID STIM HORMONE: CPT

## 2021-11-17 PROCEDURE — 80061 LIPID PANEL: CPT

## 2021-11-17 PROCEDURE — 36415 COLL VENOUS BLD VENIPUNCTURE: CPT

## 2021-11-23 ENCOUNTER — TELEPHONE (OUTPATIENT)
Dept: FAMILY MEDICINE CLINIC | Facility: CLINIC | Age: 36
End: 2021-11-23

## 2021-12-19 ENCOUNTER — NURSE ONLY (OUTPATIENT)
Dept: LAB | Facility: HOSPITAL | Age: 36
End: 2021-12-19
Attending: PREVENTIVE MEDICINE
Payer: COMMERCIAL

## 2021-12-19 ENCOUNTER — TELEPHONE (OUTPATIENT)
Dept: INTERNAL MEDICINE CLINIC | Facility: HOSPITAL | Age: 36
End: 2021-12-19

## 2021-12-19 DIAGNOSIS — Z20.822 SUSPECTED COVID-19 VIRUS INFECTION: ICD-10-CM

## 2021-12-19 DIAGNOSIS — Z20.822 SUSPECTED COVID-19 VIRUS INFECTION: Primary | ICD-10-CM

## 2021-12-19 LAB — SARS-COV-2 RNA RESP QL NAA+PROBE: DETECTED

## 2021-12-19 NOTE — TELEPHONE ENCOUNTER
----- Message from Janine Bence, MD sent at 12/19/2021  3:49 PM CST -----  Covid - DETECTED results to covid center for resulting    Results and RTW guidelines:    COVID RESULT:    [] Viewed by employee in [address]   RTW plan and instructions as fatigue that restricts your physical activities    2. Is still feeling \"unwell\"    3. Within 15 days of hospitalization for COVID    4. Within 20 days of intubation for COVID    5.  Still has a fever, vomiting or diarrhea   - Keep communication open with

## 2021-12-19 NOTE — TELEPHONE ENCOUNTER
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 12/20/2021    Did you have close contact with someone on your unit while not wearing a mask? (e.g., during meal breaks):  Yes [x]   No []    If yes, who: > 15 minutes Hallie PABLO and [employer]  Do you share a workspace?  Yes [x]   No [] NOTES:   Vaccinated emp with several symp, will rapid today.                COVID-19 testing ordered: [x] Rapid    [] Alinity    Date test is to be taken:    12/19/2021    []  Outside testing       [x] Manager notifi

## 2021-12-21 RX ORDER — ALBUTEROL SULFATE 90 UG/1
2 AEROSOL, METERED RESPIRATORY (INHALATION) EVERY 6 HOURS PRN
Qty: 6.7 EACH | Refills: 2 | Status: SHIPPED | OUTPATIENT
Start: 2021-12-21

## 2021-12-21 NOTE — TELEPHONE ENCOUNTER
Refill passed per [facility] protocol.   Requested Prescriptions   Pending Prescriptions Disp Refills    ALBUTEROL 108 (90 Base) MCG/ACT Inhalation Aero Soln [Pharmacy Med Name: ALBUTEROL HFA (PROVENTIL) INH] 6.7 each 0     Sig: TAKE 2 PUFFS BY MOUTH EVERY 6 HOURS AS NEEDED FOR WHEEZE OR SHORTNESS OF BREATH        Asthma & COPD Medication Protocol Passed - 12/21/2021 12:14 AM        Passed - Appointment in past 6 or next 3 months             Recent Outpatient Visits              2 days ago Suspected COVID-19 virus infection    [facility] [employer]    Nurse Only    1 month ago Annual physical exam    [facility], [address], [place] Dyllan Jeong MD    Office Visit    1 month ago Suspected COVID-19 virus infection    [facility] [employer]    Nurse Only    3 months ago Routine postpartum follow-up    [facility], [address], [place] Heidi Nolasco,     Postpartum    5 months ago Blood pressure check    [facility], [address], [place]    Nurse Only

## 2022-02-11 ENCOUNTER — PATIENT MESSAGE (OUTPATIENT)
Dept: FAMILY MEDICINE CLINIC | Facility: CLINIC | Age: 37
End: 2022-02-11

## 2022-02-11 ENCOUNTER — TELEPHONE (OUTPATIENT)
Dept: FAMILY MEDICINE CLINIC | Facility: CLINIC | Age: 37
End: 2022-02-11

## 2022-02-12 NOTE — TELEPHONE ENCOUNTER
Patient contacted and advised to seek care at walk in clinic or immediate care. Advised of the contagious nature of pink eye, if that is what she has.

## 2022-08-05 ENCOUNTER — TELEPHONE (OUTPATIENT)
Dept: INTERNAL MEDICINE CLINIC | Facility: HOSPITAL | Age: 37
End: 2022-08-05

## 2022-08-05 ENCOUNTER — LAB ENCOUNTER (OUTPATIENT)
Dept: LAB | Facility: HOSPITAL | Age: 37
End: 2022-08-05
Attending: PREVENTIVE MEDICINE
Payer: COMMERCIAL

## 2022-08-05 DIAGNOSIS — Z20.822 SUSPECTED 2019 NOVEL CORONAVIRUS INFECTION: Primary | ICD-10-CM

## 2022-08-05 DIAGNOSIS — Z20.822 SUSPECTED 2019 NOVEL CORONAVIRUS INFECTION: ICD-10-CM

## 2022-08-05 LAB — SARS-COV-2 RNA RESP QL NAA+PROBE: DETECTED

## 2022-08-05 NOTE — TELEPHONE ENCOUNTER
Results and RTW guidelines:    COVID RESULT:    [x] Viewed by employee in [facility]. RTW plan and instructions as indicated on triage call. Manager notified. Estimated RTW date: 8/11  [x] Discussed with employee   [] Unable to reach by phone. Sent via [employer] message      Test type:    [x] Rapid         [] Alinity         [] Outside test:       [x] Positive  Is employee unvaccinated? Yes []   No [x]          If yes:  Enter Covid Positive Medical exemption on Exemption Spreadsheet    Did you have close contact with someone on your unit while not wearing a mask? (e.g., during meal breaks):  Yes []   No []     If yes, who:     - Employee should quarantine at home for at least 5 days (day 1 is day after sx onset) , follow the CDC guidelines for cleaning and                              quarantining; see CDC.gov   -This employee may RTW on day 6 if asymptomatic or mildly symptomatic (with improving symptoms). Call Employee Health on day 5 if unable to return on day 6 after                      symptom onset.    -This employee needs to call [facility] on day 5 after symptom onset. The employee needs to be cleared by [facility]. - Monitor symptoms and temperature                 - Notify PCP of result                 - Seek emergent care with worsening symptoms   - If employee is still experiencing severe symptoms on day 5 must make a RTW appt with [facility], Employee will not be cleared if:    1. Has consistent cough, shortness of breath or fatigue that restricts your physical activities    2. Is still feeling \"unwell\"    3. Within 15 days of hospitalization for COVID    4. Within 20 days of intubation for COVID    5.  Still has a fever, vomiting or diarrhea   - Keep communication open with management about RTW and if symptoms worsen                - If outside testing completed, bring a copy of result to RTW appointment           Notes:     RTW PLAN:    [x]  If COVID positive results, off work minimum of 5 days from positive test or onset of symptoms (day 0)        On day 5, if asymptomatic or mildly symptomatic (with improving symptoms) may return to work day 6          On day 5, if symptomatic, call Employee Health for RTW screening        []  COVID positive result - call Employee Health on day 5 after symptom onset. The employee needs to be cleared by Employee Health to RTW. [] RTW immediately, continue to monitor for sx  [] RTW when sx improve; must be fever free for 24 hours w/o medications, Diarrhea/Vomiting free for 24 hours w/o medications  [] Alinity ordered; continue to monitor sx and call for new/worsening sx.   Discuss RTW guidelines with manager  [] May continue to work  [] Follow up with PCP  [] Home until further instruction from hotline with Alinity results  INSTRUCTIONS PROVIDED:  [x]  Plan as noted above  []  Length of time to obtain results   [x]  Quarantine instructions  [x]  Masking protocol   [x]  S/S of worsening infection/condition and importance of prompt medical re-evaluation including when to seek emergency care  [] If symptoms develop, stay home and call hotline for rapid test order    Estimated RTW date:  8/11    [x] The employee voiced understanding of above plan/instructions  [x] Manager Notified

## 2022-09-13 ENCOUNTER — OFFICE VISIT (OUTPATIENT)
Dept: FAMILY MEDICINE CLINIC | Facility: CLINIC | Age: 37
End: 2022-09-13
Payer: COMMERCIAL

## 2022-09-13 VITALS
SYSTOLIC BLOOD PRESSURE: 131 MMHG | HEART RATE: 75 BPM | RESPIRATION RATE: 18 BRPM | WEIGHT: 222 LBS | HEIGHT: 62.3 IN | BODY MASS INDEX: 40.34 KG/M2 | DIASTOLIC BLOOD PRESSURE: 86 MMHG | OXYGEN SATURATION: 98 %

## 2022-09-13 DIAGNOSIS — R35.0 FREQUENT URINATION: ICD-10-CM

## 2022-09-13 DIAGNOSIS — B35.1 ONYCHOMYCOSIS: Primary | ICD-10-CM

## 2022-09-13 DIAGNOSIS — R03.0 ELEVATED BP WITHOUT DIAGNOSIS OF HYPERTENSION: ICD-10-CM

## 2022-09-13 DIAGNOSIS — B00.1 RECURRENT COLD SORES: ICD-10-CM

## 2022-09-13 DIAGNOSIS — L91.8 SKIN TAG: ICD-10-CM

## 2022-09-13 DIAGNOSIS — R82.90 ABNORMAL URINE ODOR: ICD-10-CM

## 2022-09-13 LAB
APPEARANCE: CLEAR
BILIRUBIN: NEGATIVE
GLUCOSE (URINE DIPSTICK): NEGATIVE MG/DL
KETONES (URINE DIPSTICK): NEGATIVE MG/DL
LEUKOCYTES: NEGATIVE
MULTISTIX LOT#: NORMAL NUMERIC
NITRITE, URINE: NEGATIVE
OCCULT BLOOD: NEGATIVE
PH, URINE: 5.5 (ref 4.5–8)
PROTEIN (URINE DIPSTICK): NEGATIVE MG/DL
SPECIFIC GRAVITY: 1.03 (ref 1–1.03)
URINE-COLOR: YELLOW
UROBILINOGEN,SEMI-QN: 0.2 MG/DL (ref 0–1.9)

## 2022-09-13 PROCEDURE — 11200 RMVL SKIN TAGS UP TO&INC 15: CPT | Performed by: FAMILY MEDICINE

## 2022-09-13 PROCEDURE — 3075F SYST BP GE 130 - 139MM HG: CPT | Performed by: FAMILY MEDICINE

## 2022-09-13 PROCEDURE — 81002 URINALYSIS NONAUTO W/O SCOPE: CPT | Performed by: FAMILY MEDICINE

## 2022-09-13 PROCEDURE — 3008F BODY MASS INDEX DOCD: CPT | Performed by: FAMILY MEDICINE

## 2022-09-13 PROCEDURE — 3079F DIAST BP 80-89 MM HG: CPT | Performed by: FAMILY MEDICINE

## 2022-09-13 PROCEDURE — 99214 OFFICE O/P EST MOD 30 MIN: CPT | Performed by: FAMILY MEDICINE

## 2022-09-13 RX ORDER — VALACYCLOVIR HYDROCHLORIDE 1 G/1
1000 TABLET, FILM COATED ORAL EVERY 12 HOURS SCHEDULED
Qty: 30 TABLET | Refills: 3 | Status: SHIPPED | OUTPATIENT
Start: 2022-09-13

## 2022-09-13 RX ORDER — TERBINAFINE HYDROCHLORIDE 250 MG/1
250 TABLET ORAL DAILY
Qty: 90 TABLET | Refills: 0 | Status: SHIPPED | OUTPATIENT
Start: 2022-09-13

## 2022-10-19 ENCOUNTER — IMMUNIZATION (OUTPATIENT)
Dept: LAB | Facility: HOSPITAL | Age: 37
End: 2022-10-19

## 2022-10-19 DIAGNOSIS — Z23 NEED FOR VACCINATION: Primary | ICD-10-CM

## 2022-10-19 PROCEDURE — 90471 IMMUNIZATION ADMIN: CPT

## 2022-11-28 ENCOUNTER — OFFICE VISIT (OUTPATIENT)
Dept: FAMILY MEDICINE CLINIC | Facility: CLINIC | Age: 37
End: 2022-11-28
Payer: COMMERCIAL

## 2022-11-28 ENCOUNTER — LAB ENCOUNTER (OUTPATIENT)
Dept: LAB | Facility: HOSPITAL | Age: 37
End: 2022-11-28
Attending: FAMILY MEDICINE
Payer: COMMERCIAL

## 2022-11-28 VITALS
RESPIRATION RATE: 17 BRPM | BODY MASS INDEX: 41.06 KG/M2 | HEART RATE: 65 BPM | WEIGHT: 226 LBS | HEIGHT: 62.3 IN | DIASTOLIC BLOOD PRESSURE: 83 MMHG | SYSTOLIC BLOOD PRESSURE: 131 MMHG | OXYGEN SATURATION: 99 %

## 2022-11-28 DIAGNOSIS — Z00.00 ANNUAL PHYSICAL EXAM: Primary | ICD-10-CM

## 2022-11-28 DIAGNOSIS — Z00.8 ENCOUNTER FOR BIOMETRIC SCREENING: Primary | ICD-10-CM

## 2022-11-28 DIAGNOSIS — Z00.8 ENCOUNTER FOR BIOMETRIC SCREENING: ICD-10-CM

## 2022-11-28 LAB
ALBUMIN SERPL-MCNC: 4 G/DL (ref 3.4–5)
ALBUMIN/GLOB SERPL: 1.2 {RATIO} (ref 1–2)
ALP LIVER SERPL-CCNC: 83 U/L
ALT SERPL-CCNC: 31 U/L
ANION GAP SERPL CALC-SCNC: 5 MMOL/L (ref 0–18)
AST SERPL-CCNC: 17 U/L (ref 15–37)
BILIRUB SERPL-MCNC: 0.4 MG/DL (ref 0.1–2)
BUN BLD-MCNC: 10 MG/DL (ref 7–18)
BUN/CREAT SERPL: 19.6 (ref 10–20)
CALCIUM BLD-MCNC: 9.2 MG/DL (ref 8.5–10.1)
CHLORIDE SERPL-SCNC: 105 MMOL/L (ref 98–112)
CHOLEST SERPL-MCNC: 178 MG/DL (ref ?–200)
CO2 SERPL-SCNC: 29 MMOL/L (ref 21–32)
CREAT BLD-MCNC: 0.51 MG/DL
FASTING PATIENT LIPID ANSWER: YES
FASTING STATUS PATIENT QL REPORTED: YES
GFR SERPLBLD BASED ON 1.73 SQ M-ARVRAT: 125 ML/MIN/1.73M2 (ref 60–?)
GLOBULIN PLAS-MCNC: 3.3 G/DL (ref 2.8–4.4)
GLUCOSE BLD-MCNC: 86 MG/DL (ref 70–99)
HDLC SERPL-MCNC: 58 MG/DL (ref 40–59)
LDLC SERPL CALC-MCNC: 103 MG/DL (ref ?–100)
NONHDLC SERPL-MCNC: 120 MG/DL (ref ?–130)
OSMOLALITY SERPL CALC.SUM OF ELEC: 286 MOSM/KG (ref 275–295)
POTASSIUM SERPL-SCNC: 3.8 MMOL/L (ref 3.5–5.1)
PROT SERPL-MCNC: 7.3 G/DL (ref 6.4–8.2)
SODIUM SERPL-SCNC: 139 MMOL/L (ref 136–145)
TRIGL SERPL-MCNC: 91 MG/DL (ref 30–149)
VLDLC SERPL CALC-MCNC: 15 MG/DL (ref 0–30)

## 2022-11-28 PROCEDURE — 36415 COLL VENOUS BLD VENIPUNCTURE: CPT

## 2022-11-28 PROCEDURE — 80053 COMPREHEN METABOLIC PANEL: CPT

## 2022-11-28 PROCEDURE — 80061 LIPID PANEL: CPT

## 2022-11-28 PROCEDURE — 3079F DIAST BP 80-89 MM HG: CPT | Performed by: FAMILY MEDICINE

## 2022-11-28 PROCEDURE — 99395 PREV VISIT EST AGE 18-39: CPT | Performed by: FAMILY MEDICINE

## 2022-11-28 PROCEDURE — 3075F SYST BP GE 130 - 139MM HG: CPT | Performed by: FAMILY MEDICINE

## 2022-11-28 PROCEDURE — 3008F BODY MASS INDEX DOCD: CPT | Performed by: FAMILY MEDICINE

## 2024-03-12 ENCOUNTER — TELEPHONE (OUTPATIENT)
Dept: OBGYN CLINIC | Facility: CLINIC | Age: 39
End: 2024-03-12

## 2024-03-12 NOTE — TELEPHONE ENCOUNTER
LMP 1/14/24. 8w2d today based on LMP. Cycles regular every 28 days. Pt agrees to see both male and female providers. Advised pt that we recommend daily pnv with DHA, folic acid and iron. Booked pt for OBN appt on 3/22/24. Pt agrees to call clinic back should she have any additional questions or concerns.

## 2024-03-22 ENCOUNTER — NURSE ONLY (OUTPATIENT)
Dept: OBGYN CLINIC | Facility: CLINIC | Age: 39
End: 2024-03-22

## 2024-03-22 ENCOUNTER — HOSPITAL ENCOUNTER (OUTPATIENT)
Dept: ULTRASOUND IMAGING | Facility: HOSPITAL | Age: 39
Discharge: HOME | End: 2024-03-22
Attending: OBSTETRICS & GYNECOLOGY
Payer: MEDICAID

## 2024-03-22 ENCOUNTER — LAB ENCOUNTER (OUTPATIENT)
Dept: LAB | Facility: HOSPITAL | Age: 39
End: 2024-03-22
Attending: OBSTETRICS & GYNECOLOGY
Payer: MEDICAID

## 2024-03-22 ENCOUNTER — TELEPHONE (OUTPATIENT)
Dept: OBGYN CLINIC | Facility: CLINIC | Age: 39
End: 2024-03-22

## 2024-03-22 ENCOUNTER — NURSE ONLY (OUTPATIENT)
Dept: OBGYN CLINIC | Facility: CLINIC | Age: 39
End: 2024-03-22
Payer: MEDICAID

## 2024-03-22 VITALS
RESPIRATION RATE: 16 BRPM | HEART RATE: 92 BPM | WEIGHT: 210 LBS | BODY MASS INDEX: 38 KG/M2 | SYSTOLIC BLOOD PRESSURE: 126 MMHG | DIASTOLIC BLOOD PRESSURE: 85 MMHG

## 2024-03-22 DIAGNOSIS — Z34.81 ENCOUNTER FOR SUPERVISION OF OTHER NORMAL PREGNANCY IN FIRST TRIMESTER (HCC): Primary | ICD-10-CM

## 2024-03-22 DIAGNOSIS — O26.859 SPOTTING IN PREGNANCY (HCC): ICD-10-CM

## 2024-03-22 DIAGNOSIS — O26.859 SPOTTING IN PREGNANCY (HCC): Primary | ICD-10-CM

## 2024-03-22 DIAGNOSIS — Z34.81 ENCOUNTER FOR SUPERVISION OF OTHER NORMAL PREGNANCY IN FIRST TRIMESTER (HCC): ICD-10-CM

## 2024-03-22 LAB
ANTIBODY SCREEN: NEGATIVE
B-HCG SERPL-ACNC: 7679.1 MIU/ML
BASOPHILS # BLD AUTO: 0.05 X10(3) UL (ref 0–0.2)
BASOPHILS NFR BLD AUTO: 0.6 %
DEPRECATED RDW RBC AUTO: 36.5 FL (ref 35.1–46.3)
EOSINOPHIL # BLD AUTO: 0.06 X10(3) UL (ref 0–0.7)
EOSINOPHIL NFR BLD AUTO: 0.7 %
ERYTHROCYTE [DISTWIDTH] IN BLOOD BY AUTOMATED COUNT: 11.7 % (ref 11–15)
HBV SURFACE AG SER-ACNC: 0.21 [IU]/L
HBV SURFACE AG SERPL QL IA: NONREACTIVE
HCT VFR BLD AUTO: 39.2 %
HCV AB SERPL QL IA: NONREACTIVE
HGB BLD-MCNC: 13.8 G/DL
IMM GRANULOCYTES # BLD AUTO: 0.04 X10(3) UL (ref 0–1)
IMM GRANULOCYTES NFR BLD: 0.4 %
LYMPHOCYTES # BLD AUTO: 1.82 X10(3) UL (ref 1–4)
LYMPHOCYTES NFR BLD AUTO: 20.3 %
MCH RBC QN AUTO: 30.3 PG (ref 26–34)
MCHC RBC AUTO-ENTMCNC: 35.2 G/DL (ref 31–37)
MCV RBC AUTO: 86 FL
MONOCYTES # BLD AUTO: 0.41 X10(3) UL (ref 0.1–1)
MONOCYTES NFR BLD AUTO: 4.6 %
NEUTROPHILS # BLD AUTO: 6.6 X10 (3) UL (ref 1.5–7.7)
NEUTROPHILS # BLD AUTO: 6.6 X10(3) UL (ref 1.5–7.7)
NEUTROPHILS NFR BLD AUTO: 73.4 %
PLATELET # BLD AUTO: 236 10(3)UL (ref 150–450)
RBC # BLD AUTO: 4.56 X10(6)UL
RH BLOOD TYPE: NEGATIVE
RUBV IGG SER QL: POSITIVE
RUBV IGG SER-ACNC: 71.9 IU/ML (ref 10–?)
T PALLIDUM AB SER QL IA: NONREACTIVE
WBC # BLD AUTO: 9 X10(3) UL (ref 4–11)

## 2024-03-22 PROCEDURE — 87086 URINE CULTURE/COLONY COUNT: CPT

## 2024-03-22 PROCEDURE — 86787 VARICELLA-ZOSTER ANTIBODY: CPT

## 2024-03-22 PROCEDURE — 86901 BLOOD TYPING SEROLOGIC RH(D): CPT

## 2024-03-22 PROCEDURE — 86900 BLOOD TYPING SEROLOGIC ABO: CPT

## 2024-03-22 PROCEDURE — 87340 HEPATITIS B SURFACE AG IA: CPT

## 2024-03-22 PROCEDURE — 87389 HIV-1 AG W/HIV-1&-2 AB AG IA: CPT

## 2024-03-22 PROCEDURE — 85025 COMPLETE CBC W/AUTO DIFF WBC: CPT

## 2024-03-22 PROCEDURE — 86850 RBC ANTIBODY SCREEN: CPT

## 2024-03-22 PROCEDURE — 86762 RUBELLA ANTIBODY: CPT

## 2024-03-22 PROCEDURE — 76801 OB US < 14 WKS SINGLE FETUS: CPT | Performed by: OBSTETRICS & GYNECOLOGY

## 2024-03-22 PROCEDURE — 36415 COLL VENOUS BLD VENIPUNCTURE: CPT

## 2024-03-22 PROCEDURE — 86803 HEPATITIS C AB TEST: CPT

## 2024-03-22 PROCEDURE — 84702 CHORIONIC GONADOTROPIN TEST: CPT

## 2024-03-22 PROCEDURE — 76817 TRANSVAGINAL US OBSTETRIC: CPT | Performed by: OBSTETRICS & GYNECOLOGY

## 2024-03-22 PROCEDURE — 96372 THER/PROPH/DIAG INJ SC/IM: CPT | Performed by: OBSTETRICS & GYNECOLOGY

## 2024-03-22 PROCEDURE — 86780 TREPONEMA PALLIDUM: CPT

## 2024-03-22 NOTE — PROGRESS NOTES
Pt seen for OBN appt today with no complaints dating 9w5d by 1/14/25 LMP with 28 d cycles.    Pt reports multiple episodes of pink/brown spotting. Blood type O NEG.   See 3/22 TE to LOUISE.     Normal PN labs ordered, plus 1 hr gtt. Pt advised all labs must be completed and resulted prior to NPN appt. If labs are not completed and resulted the NPN appt will be cancelled. Pt informed again of both male and female providers and the need to rotate PN appt with all providers since OB on-call will be the one that delivers her. Assisted pt with scheduling NPN appt with MD.     Height: 5'2\"  Weight: 220 lb  BMI: 40.2    Partner's name is Danish Suárez contact #[phone];  Race:   Occupation: stay-at-home    MEDICAL HISTORY    Anemia No    Anesthetic complications No    Anxiety/Depression  Yes Hx of anxiety, well managed.    Autoimmune Disorder No    Asthma  No    Cancer No    Diabetes  No    Gyne/breast Surgery Yes CSX 7/8/21   Heart Disease No    Hepatitis/Liver Disease  No    History of blood transfusion No    History of abnormal pap No    Hypertension  Yes Hx elevated BP in pregnancy without diagnosis of HTN, PreE in third trimester   Infertility  No    Kidney Disease/Frequent UTIs  Yes Hx frequent UTIs   Medication Allergies No    Latex Allergies No    Food Allergies  No    Neurological Disorder/Epilepsy No    Operations/Hospitalizations No CSX as above   TB exposure  No    Thyroid Dysfunction No    Trauma/Violence  No    Uterine Anomaly  No    Uterine Fibroids  Yes    Variocosities/DVTs Yes Varicose veins BL   Smoker No    Drug usage in prior year No    Alcohol No    Would you accept a blood transfusion?  Yes              INFECTION HISTORY  Chlamydia No    Pt or partner have hx of Genital Herpes No    Gonorrhea No    Hepatitis B No    HIV No    HPV No    MRSA No    Syphilis No    Tattoos Yes 3   Live with someone or Exposed to TB No    Rash or viral illness since LMP  No    Varicella Yes Age 7   Pets No         GENETICS SCREENING    Genetic Screening    Genetic Screening/Teratology Counseling- Includes patient, baby's father, or anyone in either family with:  Patient's age 35 years or older as of estimated date of delivery: Yes     Thalassemia (Italian, Greek, Mediterranean, or  background): MCV less than 80: No     Neural tube defect (Meningomyelocele, Spina bifida, or Anencephaly): No     Congenital heart defect: No     Down syndrome: Yes (Comment: Pt's maternal uncle with Downs Syndrome)     David-Sachs (Ashkenazi Voodoo, Cajun, Malawian Moody): No     Canavan disease (Ashkenazi Voodoo): No     Familial dysautonomia (Ashkenazi Voodoo): No     Sickle cell disease or trait (): No     Hemophilia or other blood disorders: No     Muscular dystrophy: No    Cystic fibrosis: No     Juan Francisco's chorea: No     Intellectual disability and/or autism: No     Other inherited genetic or chromosomal disorder: No     Maternal metabolic disorder (eg. Type 1 diabetes, PKU): No     Patient or baby's father had child with birth defects not listed above: No     Recurrent pregnancy loss, or a stillbirth: Yes (Comment: maternal grandmother with multiple miscarriages)     Medications (including supplements, vitamins, herbs, or OTC drugs)/illicit/recreational drugs/alcohol since last menstrual period: No               MISC    Infant vaccinations  Yes    Pt. Has answered NO 5P questions and has NO  risk factors.    Pt. Given What pregnant women need to know handout.

## 2024-03-22 NOTE — TELEPHONE ENCOUNTER
Discussed with SHIRA in office- okay to offer hold & call to pt.   Should receive Rhogam given rh neg status, but advise it is being given outside of 72 hour window.   SHIRA also agreeing to add New OB 3/30 at 8:45 am.   Needs ABORH prior to nurse visit.    Pt notified. She would like to proceed with hold & call. US able to add at 2pm. Instructions given for fluid prep. Pt will come for labs now to draw ABORH, then added for Rhogam at 1pm. Patient verbalized understanding.     To LOUISE on call as FYI- pt on for 2pm hold & call.

## 2024-03-22 NOTE — PROGRESS NOTES
9w5d here for Rhogam d/t spotting in early pregnancy.  Pt verified, risk and benefits discussed and questions answered to pts satisfaction. VS signs obtained Written consent obtained. MBT: O negative.  5 rights of medication administration performed. Rhogam given IM in the right upper outer gluteal and pt tolerated it well.  VIS and pocket rhogam card given. Pt discharged via ambulatory with belongings.

## 2024-03-22 NOTE — TELEPHONE ENCOUNTER
9w5d - seen for OBN today.   Pt reports occasional spotting throughout pregnancy, does not recall date of onset.  Longest episode was a few weeks ago, lasting 3 days. Spotting was only on wiping, pink/brown in color.   Spotting resumed yesterday, still pink/brown. Cramping rated up to 5/10. Offered appt today, but pt declined due to schedule.   Directed to take tylenol. Bleeding and pain precautions reviewed.  Blood type O NEG    To LOUISE on call- okay for quants? Is rhogam still indicated, given >72 hours from initial bleeding?

## 2024-03-23 NOTE — TELEPHONE ENCOUNTER
I was paged earlier from [facility] US. I was just able to call back now. Report shows no evidence of IUP. She should be 9 weeks by dates. I called Chemistry and placed order to run Quant off serum drawn earlier today for NPN labs. I then called Karen to let her know the plan. We'll await the Quant.

## 2024-03-23 NOTE — TELEPHONE ENCOUNTER
Noted. [facility] order placed under KIRSTY.    Message to KIRSTY as FYI to await results tomorrow, 3/24.

## 2024-03-23 NOTE — TELEPHONE ENCOUNTER
HCG resulted and = 7679. I called Karen to discuss result. She has had bleeding equal to early period with some clots. We have two likely possibilities: One is a spontaneous Ab in action and second is ectopic pregnancy. She has no pain. Strict instructions to call or come to ED. It is highly unlikely that this is early successful uterine pregnancy with HCG this high and nothing in uterus on US. I asked her to repeat HCG on Sunday. Can RN please place this under Dr LOFTON name so he gets results? Please let him know so he can review chart and my note.

## 2024-03-24 ENCOUNTER — LAB ENCOUNTER (OUTPATIENT)
Dept: LAB | Facility: HOSPITAL | Age: 39
End: 2024-03-24
Attending: OBSTETRICS & GYNECOLOGY
Payer: MEDICAID

## 2024-03-24 DIAGNOSIS — O26.859 SPOTTING IN PREGNANCY (HCC): ICD-10-CM

## 2024-03-24 LAB — B-HCG SERPL-ACNC: 7180.2 MIU/ML

## 2024-03-24 PROCEDURE — 36415 COLL VENOUS BLD VENIPUNCTURE: CPT

## 2024-03-24 PROCEDURE — 84702 CHORIONIC GONADOTROPIN TEST: CPT

## 2024-03-24 NOTE — TELEPHONE ENCOUNTER
HCG downt to 7180 from 7679.  I called and spoke with pt to review decrease.  She states since Friday she  has had some heavier flow similar to period and passing of small clots and tissue.  No focal unilateral pain, just cramping  I'd recommend we repeat another HCG on Tuesday-48 hours from last.  Please place order under MD on call that day as I'll be out of town this week.  It looks like it is Dr TAFOYA.  Please review this with her and I'll send this tele encounter as well so she can review.  I gave ER instructions to pt.  All questions answered

## 2024-03-25 LAB — VZV IGG SER IA-ACNC: 200.4 (ref 165–?)

## 2024-03-25 NOTE — TELEPHONE ENCOUNTER
Noted. HCG quant ordered. Pt notified and verbalized understanding. ER precautions reviewed and pt agreed.

## 2024-03-26 ENCOUNTER — OFFICE VISIT (OUTPATIENT)
Dept: OBGYN CLINIC | Facility: CLINIC | Age: 39
End: 2024-03-26
Payer: MEDICAID

## 2024-03-26 ENCOUNTER — TELEPHONE (OUTPATIENT)
Dept: OBGYN CLINIC | Facility: CLINIC | Age: 39
End: 2024-03-26

## 2024-03-26 ENCOUNTER — LAB ENCOUNTER (OUTPATIENT)
Dept: LAB | Facility: HOSPITAL | Age: 39
End: 2024-03-26
Attending: OBSTETRICS & GYNECOLOGY
Payer: MEDICAID

## 2024-03-26 VITALS
HEART RATE: 80 BPM | SYSTOLIC BLOOD PRESSURE: 129 MMHG | DIASTOLIC BLOOD PRESSURE: 80 MMHG | BODY MASS INDEX: 40 KG/M2 | WEIGHT: 219.19 LBS

## 2024-03-26 DIAGNOSIS — O20.0 THREATENED ABORTION, ANTEPARTUM (HCC): Primary | ICD-10-CM

## 2024-03-26 DIAGNOSIS — O26.859 SPOTTING IN PREGNANCY (HCC): ICD-10-CM

## 2024-03-26 LAB — B-HCG SERPL-ACNC: 5981.6 MIU/ML

## 2024-03-26 PROCEDURE — 99213 OFFICE O/P EST LOW 20 MIN: CPT | Performed by: OBSTETRICS & GYNECOLOGY

## 2024-03-26 PROCEDURE — 36415 COLL VENOUS BLD VENIPUNCTURE: CPT

## 2024-03-26 PROCEDURE — 84702 CHORIONIC GONADOTROPIN TEST: CPT

## 2024-03-26 NOTE — TELEPHONE ENCOUNTER
Pt is having a misscarge and going in for anther HCG at noon . Pt has question on the bleeding ,

## 2024-03-26 NOTE — TELEPHONE ENCOUNTER
Pt informed of SHIMAK recs. Pt states the soonest she can come in is 11:30am since she has her son with her. Pt accepts appt today with MICHELET (on-call) at 11:30am. Message to MICHELET as ARNOLD.

## 2024-03-26 NOTE — PROGRESS NOTES
Karen Espinosa is a 38 year old female  Patient's last menstrual period was 2024 (exact date).   Chief Complaint   Patient presents with    Gyn Problem     Pt reports started miscarriage on 3/22/2024 discuss US results.     Last seen in  for pregnancy    LMP 24-- EGA 10+ wk    Started spotting 3/21.  Started to bleed with clotting over the weekend.  No pelvic pain    c 3/22/24--7679  Bhcg 3/24/24--7180  O neg.  Received Rhogam on 3/22/24    Ob ultrasound on 3/22/24-- no gestational sac seen.  Normal ovaries.  2.8 cm simple right ovarian cyst.  4.8cm and 3.4 cm fibroid      OBSTETRICS HISTORY:  OB History    Para Term  AB Living   2 1 1 0 0 1   SAB IAB Ectopic Multiple Live Births   0 0 0 0 1       GYNE HISTORY        MEDICAL HISTORY:  Past Medical History:   Diagnosis Date    Anxiety     Decorative tattoo     Frequent UTI     History of chicken pox      Past Surgical History:   Procedure Laterality Date             SOCIAL HISTORY:  Social History     Socioeconomic History    Marital status: Single   Tobacco Use    Smoking status: Never    Smokeless tobacco: Never   Vaping Use    Vaping Use: Never used   Substance and Sexual Activity    Alcohol use: Not Currently     Comment: socially    Drug use: Never    Sexual activity: Yes     Partners: Male     Comment: same partner     Social Determinants of Health     Financial Resource Strain: Low Risk  (3/20/2024)    Financial Resource Strain     Difficulty of Paying Living Expenses: Not hard at all     Med Affordability: No   Food Insecurity: No Food Insecurity (3/20/2024)    Food Insecurity     Food Insecurity: Never true   Transportation Needs: No Transportation Needs (3/20/2024)    Transportation Needs     Lack of Transportation: No   Stress: No Stress Concern Present (3/20/2024)    Stress     Feeling of Stress : No   Housing Stability: Low Risk  (3/20/2024)    Housing Stability     Housing Instability: No        MEDICATIONS:  Current Outpatient Medications   Medication Sig Dispense Refill    valACYclovir 1 G Oral Tab Take 1 tablet (1,000 mg total) by mouth every 12 (twelve) hours. (Patient not taking: Reported on 3/22/2024) 30 tablet 3       ALLERGIES:    Allergies   Allergen Reactions    Compazine [Prochlorperazine]          PHYSICAL EXAM:   /80   Pulse 80   Wt 219 lb 3.2 oz (99.4 kg)   LMP 2024 (Exact Date)   BMI 39.71 kg/m²   Body mass index is 39.71 kg/m².    Constitutional: well developed, well nourished     Pelvic Exam:  External Genitalia: normal appearance, hair distribution, and no lesions  Urethral Meatus:  normal in size, location, without lesions and prolapse  Bladder:  No fullness, masses or tenderness  Vagina:  Normal appearance without lesions, no abnormal discharge.  Moderate blood in vault  Cervix:  Normal without tenderness on motion.  Cervix closed.  No active bleeding  Uterus: normal in size, contour, position, mobility, without tenderness.  Uterus 13 wk size.    Adnexa: normal without masses or tenderness      Assessment & Plan:  1. Threatened , antepartum (HCC)  Bhcg today.  If decreasing significantly, then weekly bhcg til negative.  Bleeding precautions given.          Requested Prescriptions      No prescriptions requested or ordered in this encounter

## 2024-03-26 NOTE — TELEPHONE ENCOUNTER
Pt calling to review ER precautions. Pt reports having 5/10 pain, her bleeding is not to the point of soaking a pad - stating its hard to determine the amount, and she passed a small triangular sized blood clot that is about 1/2 the size of a golf ball. Pt states she has bleeding the same for about 5 days.     RN reassured pt, that bleeding is expected with a miscarriage but advised to monitor if the bleeding becomes to the point where she is soaking through a pad every hour or less, if pain is a 7/10 or greater without the relief of pain medication, or if she passes any blood clots that are the size of a golf ball or larger then she should go to the ER. Pt advised to take Tylenol or ibuprofen for the pain and to stay well hydrated with at least 64 oz of water daily. Pt verbalized understanding.     Pt will complete repeat HCG quant today at noon per KIRSTY. See 3/22 TE.  NPN appt canceled.    Pt with more questions regarding her US on 3/22/24. Pt asking if there is more than one sac, and if the retroverted uterus is a problem, and if so if can it be reversed? Pt states she has a hx of fibroids that she knows about, but asked if the hypoechoic cyst something she already knows about or if this is new. Pt advised a message will be routed to JLK (on-call) to review. Pt appreciative and states its not urgent, but requested to speak with provider regarding US results.     Message to JLK (on-call) to please review and advise. Thank you.

## 2024-03-26 NOTE — TELEPHONE ENCOUNTER
Since she has not been seen for this pregnancy/miscarriage, if she able to come now I can see her and discuss/examine   <<-----Click on this checkbox to enter Procedure Removal and replacement of inflatable penile prosthesis  06/25/2018    Active  DPATERNOSTER

## 2024-03-30 DIAGNOSIS — O03.9 SAB (SPONTANEOUS ABORTION) (HCC): Primary | ICD-10-CM

## 2024-04-02 ENCOUNTER — LAB ENCOUNTER (OUTPATIENT)
Dept: LAB | Facility: HOSPITAL | Age: 39
End: 2024-04-02
Attending: OBSTETRICS & GYNECOLOGY
Payer: MEDICAID

## 2024-04-02 DIAGNOSIS — O03.9 SAB (SPONTANEOUS ABORTION) (HCC): ICD-10-CM

## 2024-04-02 LAB — B-HCG SERPL-ACNC: 3233.8 MIU/ML

## 2024-04-02 PROCEDURE — 84702 CHORIONIC GONADOTROPIN TEST: CPT

## 2024-04-02 PROCEDURE — 36415 COLL VENOUS BLD VENIPUNCTURE: CPT

## 2024-04-10 ENCOUNTER — LAB ENCOUNTER (OUTPATIENT)
Dept: LAB | Facility: HOSPITAL | Age: 39
End: 2024-04-10
Attending: OBSTETRICS & GYNECOLOGY
Payer: MEDICAID

## 2024-04-10 DIAGNOSIS — O03.9 SAB (SPONTANEOUS ABORTION) (HCC): ICD-10-CM

## 2024-04-10 LAB — B-HCG SERPL-ACNC: 789.6 MIU/ML

## 2024-04-10 PROCEDURE — 36415 COLL VENOUS BLD VENIPUNCTURE: CPT

## 2024-04-10 PROCEDURE — 84702 CHORIONIC GONADOTROPIN TEST: CPT

## 2024-04-16 ENCOUNTER — TELEPHONE (OUTPATIENT)
Dept: OBGYN UNIT | Facility: HOSPITAL | Age: 39
End: 2024-04-16

## 2024-04-17 ENCOUNTER — LAB ENCOUNTER (OUTPATIENT)
Dept: LAB | Facility: HOSPITAL | Age: 39
End: 2024-04-17
Attending: OBSTETRICS & GYNECOLOGY
Payer: MEDICAID

## 2024-04-17 DIAGNOSIS — O03.9 SAB (SPONTANEOUS ABORTION) (HCC): ICD-10-CM

## 2024-04-17 LAB — B-HCG SERPL-ACNC: 210.4 MIU/ML

## 2024-04-17 PROCEDURE — 36415 COLL VENOUS BLD VENIPUNCTURE: CPT

## 2024-04-17 PROCEDURE — 84702 CHORIONIC GONADOTROPIN TEST: CPT

## 2024-04-24 ENCOUNTER — LAB ENCOUNTER (OUTPATIENT)
Dept: LAB | Facility: HOSPITAL | Age: 39
End: 2024-04-24
Attending: OBSTETRICS & GYNECOLOGY
Payer: MEDICAID

## 2024-04-24 DIAGNOSIS — O03.9 SAB (SPONTANEOUS ABORTION) (HCC): ICD-10-CM

## 2024-04-24 LAB — B-HCG SERPL-ACNC: 83 MIU/ML

## 2024-04-24 PROCEDURE — 36415 COLL VENOUS BLD VENIPUNCTURE: CPT

## 2024-04-24 PROCEDURE — 84702 CHORIONIC GONADOTROPIN TEST: CPT

## 2024-05-02 ENCOUNTER — LAB ENCOUNTER (OUTPATIENT)
Dept: LAB | Facility: HOSPITAL | Age: 39
End: 2024-05-02
Attending: OBSTETRICS & GYNECOLOGY
Payer: MEDICAID

## 2024-05-02 DIAGNOSIS — O03.9 SAB (SPONTANEOUS ABORTION) (HCC): ICD-10-CM

## 2024-05-02 LAB — B-HCG SERPL-ACNC: 52 MIU/ML

## 2024-05-02 PROCEDURE — 36415 COLL VENOUS BLD VENIPUNCTURE: CPT

## 2024-05-02 PROCEDURE — 84702 CHORIONIC GONADOTROPIN TEST: CPT

## 2024-05-08 ENCOUNTER — LAB ENCOUNTER (OUTPATIENT)
Dept: LAB | Facility: HOSPITAL | Age: 39
End: 2024-05-08
Attending: OBSTETRICS & GYNECOLOGY
Payer: MEDICAID

## 2024-05-08 DIAGNOSIS — O03.9 SAB (SPONTANEOUS ABORTION) (HCC): ICD-10-CM

## 2024-05-08 LAB — B-HCG SERPL-ACNC: 40.2 MIU/ML

## 2024-05-08 PROCEDURE — 84702 CHORIONIC GONADOTROPIN TEST: CPT

## 2024-05-08 PROCEDURE — 36415 COLL VENOUS BLD VENIPUNCTURE: CPT

## 2024-05-13 ENCOUNTER — OFFICE VISIT (OUTPATIENT)
Dept: FAMILY MEDICINE CLINIC | Facility: CLINIC | Age: 39
End: 2024-05-13
Payer: MEDICAID

## 2024-05-13 VITALS
OXYGEN SATURATION: 98 % | BODY MASS INDEX: 41.41 KG/M2 | SYSTOLIC BLOOD PRESSURE: 125 MMHG | RESPIRATION RATE: 18 BRPM | HEIGHT: 62 IN | WEIGHT: 225 LBS | HEART RATE: 76 BPM | DIASTOLIC BLOOD PRESSURE: 85 MMHG

## 2024-05-13 DIAGNOSIS — M79.89 AXILLARY SWELLING: Primary | ICD-10-CM

## 2024-05-13 DIAGNOSIS — R53.83 FATIGUE, UNSPECIFIED TYPE: ICD-10-CM

## 2024-05-13 DIAGNOSIS — O03.9 MISCARRIAGE (HCC): ICD-10-CM

## 2024-05-13 PROCEDURE — 99214 OFFICE O/P EST MOD 30 MIN: CPT | Performed by: FAMILY MEDICINE

## 2024-05-13 NOTE — PROGRESS NOTES
HPI:    Karen Espinosa is a 38 year old female presents to clinic with concerns regarding underarm swellings.  Noticed tender swelling on the left side about 5 months back.  Denies any breast changes.  Had a few colds over the winter, but nothing recently.  Reports some fatigue.  Denies fevers, chills, appetite changes, weight loss.  Patient recently suffered miscarriage.  Following with OB/GYN.  States that hCG levels have been slow to come down.  For the past 2 months, patient reports fatigue.  Sleeps about 6 to 8 hours at night, still feels tired in the morning.      HISTORY:  Past Medical History:    Anxiety    Decorative tattoo    Frequent UTI    History of chicken pox      Past Surgical History:   Procedure Laterality Date            Family History   Problem Relation Age of Onset    Other (Other) Mother         hysterectomy-menorrhagia    Cancer Maternal Aunt         Dx with breast cancer in her 40's and passed in 50's.    Ovarian Cancer Maternal Aunt     Breast Cancer Maternal Aunt     Uterine Cancer Maternal Aunt       Social History:   Social History     Socioeconomic History    Marital status: Single   Tobacco Use    Smoking status: Never    Smokeless tobacco: Never   Vaping Use    Vaping status: Never Used   Substance and Sexual Activity    Alcohol use: Not Currently     Comment: socially    Drug use: Never    Sexual activity: Yes     Partners: Male     Comment: same partner     Social Determinants of Health     Financial Resource Strain: Low Risk  (3/20/2024)    Financial Resource Strain     Difficulty of Paying Living Expenses: Not hard at all     Med Affordability: No   Food Insecurity: No Food Insecurity (3/20/2024)    Food Insecurity     Food Insecurity: Never true   Transportation Needs: No Transportation Needs (3/20/2024)    Transportation Needs     Lack of Transportation: No   Stress: No Stress Concern Present (3/20/2024)    Stress     Feeling of Stress : No   Housing Stability: Low  Risk  (3/20/2024)    Housing Stability     Housing Instability: No        Medications (Active prior to today's visit):  Current Outpatient Medications   Medication Sig Dispense Refill    valACYclovir 1 G Oral Tab Take 1 tablet (1,000 mg total) by mouth every 12 (twelve) hours. (Patient not taking: Reported on 3/22/2024) 30 tablet 3       Allergies:  Allergies   Allergen Reactions    Compazine [Prochlorperazine]          Depression Screening (PHQ-2/PHQ-9): Over the LAST 2 WEEKS   Little interest or pleasure in doing things: Not at all    Feeling down, depressed, or hopeless: Not at all    PHQ-2 SCORE: 0           ROS:   Review of Systems   All other systems reviewed and are negative.      PHYSICAL EXAM:     Vitals:    05/13/24 1502   BP: 125/85   BP Location: Left arm   Patient Position: Sitting   Cuff Size: large   Pulse: 76   Resp: 18   SpO2: 98%   Weight: 225 lb (102.1 kg)   Height: 5' 2\" (1.575 m)     Physical Exam  Vitals reviewed.   Constitutional:       General: She is not in acute distress.  Cardiovascular:      Rate and Rhythm: Normal rate.   Pulmonary:      Effort: Pulmonary effort is normal. No respiratory distress.   Chest:   Breasts:     Right: Normal. No swelling, bleeding, inverted nipple, mass, nipple discharge, skin change or tenderness.      Left: Normal. No swelling, bleeding, inverted nipple, mass, nipple discharge, skin change or tenderness.      Comments: +tenderness in bilateral axillary areas   Neurological:      Mental Status: She is alert. Mental status is at baseline.   Psychiatric:         Mood and Affect: Mood normal.         ASSESSMENT/PLAN:   (M79.89) Axillary swelling  (primary encounter diagnosis)  Plan: [facility] SEMAJ 2D+3D DIAGNOSTIC [facility]  BILAT         (CPT=[id number])  -Bilateral tenderness on exam.  Diagnostic mammogram ordered.  Will await results.    (R53.83) Fatigue, unspecified type  Plan: CBC W Differential W Platelet [E], TSH W Reflex        To Free T4 [E], Vitamin D [E], Vitamin  B12 [E],        Iron And Tibc [E]  -Labs drawn to assess for underlying causes of fatigue, vitamin deficiencies.  Will await results.    (O03.9) Miscarriage (HCC)  Plan:   -Coping well, finds it helpful to speak to others who have had similar experiences.  Following with OB/GYN.  Will continue to follow.             Responsible party/patient verbalized understanding of information discussed. No barriers to learning observed.            Orders This Visit:  Orders Placed This Encounter   Procedures    CBC W Differential W Platelet [E]    TSH W Reflex To Free T4 [E]    Vitamin D [E]    Vitamin B12 [E]    Iron And Tibc [E]       Meds This Visit:  Requested Prescriptions      No prescriptions requested or ordered in this encounter       Imaging & Referrals:  [facility] SEM[facility] 2D+3D DIAGNOSTIC [facility]  BILAT (KOI=[id number])     Chaperone offered at visit today.     The 21st Century cures Act makes medical notes like these available to patients in the interest of transparency.  However, be advised that this is a medical document.  It is intended as peer to peer communication.  It is written in medical language and may contain abbreviations or verbiage that are unfamiliar.  It may appear blunt or direct.  Medical documents are intended to carry relevant information, facts as evident, and the clinical opinion of the practitioner.      This note was created by [employer] voice recognition. Errors in content may be related to improper recognition by the system; efforts to review and correct have been done but errors may still exist. Please contact me with any questions.       5/13/2024  Luis Arevalo MD

## 2024-05-15 ENCOUNTER — LAB ENCOUNTER (OUTPATIENT)
Dept: LAB | Facility: HOSPITAL | Age: 39
End: 2024-05-15
Attending: OBSTETRICS & GYNECOLOGY

## 2024-05-15 DIAGNOSIS — R53.83 FATIGUE, UNSPECIFIED TYPE: ICD-10-CM

## 2024-05-15 DIAGNOSIS — O03.9 SAB (SPONTANEOUS ABORTION) (HCC): ICD-10-CM

## 2024-05-15 LAB
B-HCG SERPL-ACNC: 30.6 MIU/ML
BASOPHILS # BLD AUTO: 0.06 X10(3) UL (ref 0–0.2)
BASOPHILS NFR BLD AUTO: 0.6 %
DEPRECATED RDW RBC AUTO: 37.5 FL (ref 35.1–46.3)
EOSINOPHIL # BLD AUTO: 0.13 X10(3) UL (ref 0–0.7)
EOSINOPHIL NFR BLD AUTO: 1.4 %
ERYTHROCYTE [DISTWIDTH] IN BLOOD BY AUTOMATED COUNT: 11.7 % (ref 11–15)
HCT VFR BLD AUTO: 37.8 %
HGB BLD-MCNC: 13 G/DL
IMM GRANULOCYTES # BLD AUTO: 0.03 X10(3) UL (ref 0–1)
IMM GRANULOCYTES NFR BLD: 0.3 %
IRON SATN MFR SERPL: 19 %
IRON SERPL-MCNC: 64 UG/DL
LYMPHOCYTES # BLD AUTO: 2.75 X10(3) UL (ref 1–4)
LYMPHOCYTES NFR BLD AUTO: 28.8 %
MCH RBC QN AUTO: 30 PG (ref 26–34)
MCHC RBC AUTO-ENTMCNC: 34.4 G/DL (ref 31–37)
MCV RBC AUTO: 87.3 FL
MONOCYTES # BLD AUTO: 0.56 X10(3) UL (ref 0.1–1)
MONOCYTES NFR BLD AUTO: 5.9 %
NEUTROPHILS # BLD AUTO: 6.01 X10 (3) UL (ref 1.5–7.7)
NEUTROPHILS # BLD AUTO: 6.01 X10(3) UL (ref 1.5–7.7)
NEUTROPHILS NFR BLD AUTO: 63 %
PLATELET # BLD AUTO: 234 10(3)UL (ref 150–450)
RBC # BLD AUTO: 4.33 X10(6)UL
TIBC SERPL-MCNC: 329 UG/DL (ref 250–425)
TRANSFERRIN SERPL-MCNC: 221 MG/DL (ref 250–380)
TSI SER-ACNC: 2.15 MIU/ML (ref 0.55–4.78)
VIT B12 SERPL-MCNC: 532 PG/ML (ref 211–911)
VIT D+METAB SERPL-MCNC: 8.2 NG/ML (ref 30–100)
WBC # BLD AUTO: 9.5 X10(3) UL (ref 4–11)

## 2024-05-15 PROCEDURE — 82306 VITAMIN D 25 HYDROXY: CPT

## 2024-05-15 PROCEDURE — 82607 VITAMIN B-12: CPT

## 2024-05-15 PROCEDURE — 36415 COLL VENOUS BLD VENIPUNCTURE: CPT

## 2024-05-15 PROCEDURE — 84466 ASSAY OF TRANSFERRIN: CPT

## 2024-05-15 PROCEDURE — 83540 ASSAY OF IRON: CPT

## 2024-05-15 PROCEDURE — 85025 COMPLETE CBC W/AUTO DIFF WBC: CPT

## 2024-05-15 PROCEDURE — 84443 ASSAY THYROID STIM HORMONE: CPT

## 2024-05-15 PROCEDURE — 84702 CHORIONIC GONADOTROPIN TEST: CPT

## 2024-05-22 ENCOUNTER — LAB ENCOUNTER (OUTPATIENT)
Dept: LAB | Facility: HOSPITAL | Age: 39
End: 2024-05-22
Attending: OBSTETRICS & GYNECOLOGY

## 2024-05-22 DIAGNOSIS — O03.9 SAB (SPONTANEOUS ABORTION) (HCC): ICD-10-CM

## 2024-05-22 LAB — B-HCG SERPL-ACNC: 18.5 MIU/ML

## 2024-05-22 PROCEDURE — 84702 CHORIONIC GONADOTROPIN TEST: CPT

## 2024-05-22 PROCEDURE — 36415 COLL VENOUS BLD VENIPUNCTURE: CPT

## 2024-05-30 ENCOUNTER — HOSPITAL ENCOUNTER (OUTPATIENT)
Dept: MAMMOGRAPHY | Facility: HOSPITAL | Age: 39
Discharge: HOME | End: 2024-05-30
Attending: FAMILY MEDICINE
Payer: MEDICAID

## 2024-05-30 ENCOUNTER — LAB ENCOUNTER (OUTPATIENT)
Dept: LAB | Facility: HOSPITAL | Age: 39
End: 2024-05-30
Attending: FAMILY MEDICINE
Payer: MEDICAID

## 2024-05-30 ENCOUNTER — TELEPHONE (OUTPATIENT)
Dept: OBGYN CLINIC | Facility: CLINIC | Age: 39
End: 2024-05-30

## 2024-05-30 ENCOUNTER — HOSPITAL ENCOUNTER (OUTPATIENT)
Dept: ULTRASOUND IMAGING | Facility: HOSPITAL | Age: 39
Discharge: HOME | End: 2024-05-30
Attending: FAMILY MEDICINE
Payer: MEDICAID

## 2024-05-30 DIAGNOSIS — M79.89 AXILLARY SWELLING: ICD-10-CM

## 2024-05-30 DIAGNOSIS — O03.4 INCOMPLETE SPONTANEOUS ABORTION (HCC): Primary | ICD-10-CM

## 2024-05-30 DIAGNOSIS — O03.4 INCOMPLETE SPONTANEOUS ABORTION (HCC): ICD-10-CM

## 2024-05-30 LAB — B-HCG SERPL-ACNC: 11.1 MIU/ML

## 2024-05-30 PROCEDURE — 77066 DX MAMMO INCL CAD BI: CPT | Performed by: FAMILY MEDICINE

## 2024-05-30 PROCEDURE — 36415 COLL VENOUS BLD VENIPUNCTURE: CPT

## 2024-05-30 PROCEDURE — 77062 BREAST TOMOSYNTHESIS BI: CPT | Performed by: FAMILY MEDICINE

## 2024-05-30 PROCEDURE — 84702 CHORIONIC GONADOTROPIN TEST: CPT

## 2024-05-30 PROCEDURE — 76642 ULTRASOUND BREAST LIMITED: CPT | Performed by: FAMILY MEDICINE

## 2024-05-30 NOTE — TELEPHONE ENCOUNTER
Susie Bess MD  5/25/2024  9:00 AM CDT Back to Top      Bhcg down to 18.  Weekly bhcg until negative  mychart     Patient presented to the lab to complete HCG blood draw, order was not in patient's chart. Order placed for standing weekly quants to zero.

## 2024-06-07 ENCOUNTER — LAB ENCOUNTER (OUTPATIENT)
Dept: LAB | Age: 39
End: 2024-06-07
Attending: OBSTETRICS & GYNECOLOGY
Payer: MEDICAID

## 2024-06-07 DIAGNOSIS — O03.4 INCOMPLETE SPONTANEOUS ABORTION (HCC): ICD-10-CM

## 2024-06-07 LAB — B-HCG SERPL-ACNC: 5.5 MIU/ML

## 2024-06-07 PROCEDURE — 84702 CHORIONIC GONADOTROPIN TEST: CPT

## 2024-06-07 PROCEDURE — 36415 COLL VENOUS BLD VENIPUNCTURE: CPT

## 2024-06-15 ENCOUNTER — HOSPITAL ENCOUNTER (OUTPATIENT)
Age: 39
Discharge: HOME | End: 2024-06-15

## 2024-06-15 ENCOUNTER — APPOINTMENT (OUTPATIENT)
Dept: GENERAL RADIOLOGY | Age: 39
End: 2024-06-15
Attending: NURSE PRACTITIONER

## 2024-06-15 VITALS
OXYGEN SATURATION: 100 % | RESPIRATION RATE: 20 BRPM | TEMPERATURE: 98 F | HEART RATE: 85 BPM | SYSTOLIC BLOOD PRESSURE: 145 MMHG | DIASTOLIC BLOOD PRESSURE: 92 MMHG

## 2024-06-15 DIAGNOSIS — Z20.822 ENCOUNTER FOR LABORATORY TESTING FOR COVID-19 VIRUS: ICD-10-CM

## 2024-06-15 DIAGNOSIS — J40 BRONCHITIS: Primary | ICD-10-CM

## 2024-06-15 DIAGNOSIS — Z20.822 LAB TEST NEGATIVE FOR COVID-19 VIRUS: ICD-10-CM

## 2024-06-15 LAB — SARS-COV-2 RNA RESP QL NAA+PROBE: NOT DETECTED

## 2024-06-15 PROCEDURE — 71046 X-RAY EXAM CHEST 2 VIEWS: CPT | Performed by: NURSE PRACTITIONER

## 2024-06-15 PROCEDURE — 99213 OFFICE O/P EST LOW 20 MIN: CPT | Performed by: NURSE PRACTITIONER

## 2024-06-15 PROCEDURE — U0002 COVID-19 LAB TEST NON-CDC: HCPCS | Performed by: NURSE PRACTITIONER

## 2024-06-15 RX ORDER — ALBUTEROL SULFATE 90 UG/1
2 AEROSOL, METERED RESPIRATORY (INHALATION) EVERY 4 HOURS PRN
Qty: 1 EACH | Refills: 0 | Status: SHIPPED | OUTPATIENT
Start: 2024-06-15 | End: 2024-07-15

## 2024-06-15 NOTE — ED PROVIDER NOTES
Patient Seen in: [facility]      History     Chief Complaint   Patient presents with    Cough/URI     Stated Complaint: Cough;Fever;Runny nose;Chills;Muscle pain;Severe headache;Shortness of breath    Subjective:   Well-appearing 38-year-old female with anxiety presents with complaints of nonproductive cough, nasal congestion, runny nose, body aches, chills, fever and intermittent wheezing since yesterday.  Patient communicates that the highest temperature has been 103.  Patient communicates that she has been taking over-the-counter Tylenol for fever.  Patient denies chest pain or shortness of breath.  Patient communicates that when she usually gets \"colds\", she needs an inhaler for relief of shortness of breath and wheezing.                Objective:   Past Medical History:    Anxiety    Decorative tattoo    Frequent UTI    History of chicken pox              Past Surgical History:   Procedure Laterality Date                      Social History     Socioeconomic History    Marital status: Single   Tobacco Use    Smoking status: Never    Smokeless tobacco: Never   Vaping Use    Vaping status: Never Used   Substance and Sexual Activity    Alcohol use: Not Currently     Comment: socially    Drug use: Never    Sexual activity: Yes     Partners: Male     Comment: same partner     Social Determinants of Health     Financial Resource Strain: Low Risk  (3/20/2024)    Financial Resource Strain     Difficulty of Paying Living Expenses: Not hard at all     Med Affordability: No   Food Insecurity: No Food Insecurity (3/20/2024)    Food Insecurity     Food Insecurity: Never true   Transportation Needs: No Transportation Needs (3/20/2024)    Transportation Needs     Lack of Transportation: No   Stress: No Stress Concern Present (3/20/2024)    Stress     Feeling of Stress : No   Housing Stability: Low Risk  (3/20/2024)    Housing Stability     Housing Instability: No              Review of  Systems    Positive for stated complaint: Cough;Fever;Runny nose;Chills;Muscle pain;Severe headache;Shortness of breath  Other systems are as noted in HPI.  Constitutional and vital signs reviewed.      All other systems reviewed and negative except as noted above.    Physical Exam     ED Triage Vitals [06/15/24 1233]   BP (!) 145/92   Pulse 85   Resp 20   Temp 97.5 °F (36.4 °C)   Temp src Temporal   SpO2 100 %   O2 Device None (Room air)       Current Vitals:   Vital Signs  BP: (!) 145/92  Pulse: 85  Resp: 20  Temp: 97.5 °F (36.4 °C)  Temp src: Temporal    Oxygen Therapy  SpO2: 100 %  O2 Device: None (Room air)    Physical Exam  VS: Vital signs reviewed. 02 saturation within normal limits for this patient.    General: Patient is awake and alert, oriented to person, place and time. Pt appears non-toxic.     HEENT: Head is normocephalic, atraumatic. Nonicteric sclera, no conjunctival injection. No facial droop or slurred speech. No oral lesions or pallor. Mucous membranes moist.      Right Ear: Tympanic membrane, ear canal and external ear normal.      Left Ear: Tympanic membrane, ear canal and external ear normal.      Nose: No congestion or rhinorrhea.      Mouth/Throat:      Lips: Pink.      Mouth: Mucous membranes are moist.      Pharynx: Oropharynx is clear.     Neck: No cervical lymphadenopathy. Supple. Normal ROM.    Lungs:      Effort: No tachypnea, prolonged expiration or respiratory distress.      Breath sounds: Expiratory wheezing present, otherwise clear.  No decreased breath sounds.     Extremities: No focal swelling or tenderness. Capillary refill noted.     Skin: Warm, dry and normal in color.     Psychiatric: Normal affect, judgement normal, insight normal.     CNS: Moves all 4 extremities. Interacts appropriately. No gait abnormality. Memory normal.        ED Course     Labs Reviewed   RAPID SARS-COV-2 BY PCR - Normal   PROCEDURE: XR CHEST PA + LAT CHEST (CPT=71046)     COMPARISON: None.      INDICATIONS: Productive cough and shortness of breath x 3 days.     TECHNIQUE:   Two views.       FINDINGS:  CARDIAC/VASC: Heart size and pulmonary vascularity normal.  MEDIAST/NIRMALA:   No visible mass or adenopathy.  LUNGS/PLEURA: No consolidation or pleural effusion.  BONES: No fracture or visible bony lesion.  OTHER: Negative.       Impression  CONCLUSION:  1. Negative chest.     Dictated by (CST): Noah Allan MD on 6/15/2024 at 1:28 PM      Finalized by (CST): Noah Allan MD on 6/15/2024 at 1:29 PM      [facility]   Medical Decision Making  Well-appearing.  Rapid COVID-19 PCR not detected.  Chest x-ray shows negative chest.  I independently reviewed the chest x-ray, negative for infiltrates.  I discussed over-the-counter cough and cold medications as needed for symptoms.  Prescription for albuterol inhaler was sent to pharmacy on file.  Close PMD follow-up as well as return precautions discussed.    Problems Addressed:  Bronchitis: acute illness or injury  Encounter for laboratory testing for COVID-19 virus: acute illness or injury  Lab test negative for COVID-19 virus: acute illness or injury    Amount and/or Complexity of Data Reviewed  Labs: ordered. Decision-making details documented in ED Course.  Radiology: ordered and independent interpretation performed. Decision-making details documented in ED Course.    Risk  OTC drugs.  Prescription drug management.        Disposition and Plan     Clinical Impression:  1. Bronchitis    2. Encounter for laboratory testing for COVID-19 virus    3. Lab test negative for COVID-19 virus         Disposition:  Discharge  6/15/2024  1:37 pm    Follow-up:  Luis Arevalo MD  [address] [place] [id number]  [phone]    In 1 week  As needed          Medications Prescribed:  Discharge Medication List as of 6/15/2024  1:39 PM        START taking these medications    Details   albuterol 108 (90 Base) MCG/ACT Inhalation Aero Soln Inhale 2 puffs into the lungs every 4  (four) hours as needed for Wheezing., Normal, Disp-1 each, R-0

## 2024-06-15 NOTE — ED INITIAL ASSESSMENT (HPI)
Pt came in due to cough, congestion, runny nose, body aches, chills, and wheezing. Pt stated she ran out of inhaler. Pt has easy non labored respirations.

## 2024-06-19 ENCOUNTER — LAB ENCOUNTER (OUTPATIENT)
Dept: LAB | Facility: HOSPITAL | Age: 39
End: 2024-06-19
Attending: OBSTETRICS & GYNECOLOGY

## 2024-06-19 ENCOUNTER — TELEPHONE (OUTPATIENT)
Dept: OBGYN CLINIC | Facility: CLINIC | Age: 39
End: 2024-06-19

## 2024-06-19 DIAGNOSIS — O03.4 INCOMPLETE SPONTANEOUS ABORTION (HCC): ICD-10-CM

## 2024-06-19 LAB — B-HCG SERPL-ACNC: 6.6 MIU/ML

## 2024-06-19 PROCEDURE — 36415 COLL VENOUS BLD VENIPUNCTURE: CPT

## 2024-06-19 PROCEDURE — 84702 CHORIONIC GONADOTROPIN TEST: CPT

## 2024-06-19 NOTE — TELEPHONE ENCOUNTER
Patient notified of Dr. Bess recommendations. Patient reports she did have unprotected intercourse 2 days ago. Patient states she missed her lab last week because she had bronchitis.     Patient advised she will need to repeat in 48 hrs, since she had unprotected intercourse. Patient states she will be out of town this weekend. Patient expressed that she is willing to go on Friday in the morning before 7am before she leaves or willing to go on Monday when she gets back. Patient advised that lab should be repeated 48-72 hrs, but a message will be routed to Dr. Bess for review.    Message to Dr. Bess to please review and advise. Thank you.     Susie Bess MD  6/19/2024  3:11 PM CDT Back to Top      Bhcg 6.6--increased from 5.5.   If she had unprotected intercourse, repeat in 48 hrs.  If not, then repeat in 1 week with appointment in office

## 2024-06-20 NOTE — TELEPHONE ENCOUNTER
This RN spoke with patient, patient states she started period today, patient states she can not come for blood work tomorrow, will come Monday for lab work.

## 2024-06-24 ENCOUNTER — LAB ENCOUNTER (OUTPATIENT)
Dept: LAB | Facility: REFERENCE LAB | Age: 39
End: 2024-06-24
Attending: OBSTETRICS & GYNECOLOGY

## 2024-06-24 DIAGNOSIS — O03.4 INCOMPLETE SPONTANEOUS ABORTION (HCC): ICD-10-CM

## 2024-06-24 LAB — B-HCG SERPL-ACNC: 4.6 MIU/ML

## 2024-06-24 PROCEDURE — 36415 COLL VENOUS BLD VENIPUNCTURE: CPT

## 2024-06-24 PROCEDURE — 84702 CHORIONIC GONADOTROPIN TEST: CPT

## 2024-06-28 ENCOUNTER — LAB ENCOUNTER (OUTPATIENT)
Dept: LAB | Age: 39
End: 2024-06-28
Attending: FAMILY MEDICINE
Payer: MEDICAID

## 2024-06-28 ENCOUNTER — OFFICE VISIT (OUTPATIENT)
Dept: FAMILY MEDICINE CLINIC | Facility: CLINIC | Age: 39
End: 2024-06-28

## 2024-06-28 VITALS
BODY MASS INDEX: 40.67 KG/M2 | HEIGHT: 62 IN | SYSTOLIC BLOOD PRESSURE: 120 MMHG | WEIGHT: 221 LBS | DIASTOLIC BLOOD PRESSURE: 84 MMHG | RESPIRATION RATE: 17 BRPM | OXYGEN SATURATION: 97 % | HEART RATE: 71 BPM

## 2024-06-28 DIAGNOSIS — Z00.00 ANNUAL PHYSICAL EXAM: Primary | ICD-10-CM

## 2024-06-28 DIAGNOSIS — E55.9 VITAMIN D DEFICIENCY: ICD-10-CM

## 2024-06-28 DIAGNOSIS — F41.9 ANXIETY: ICD-10-CM

## 2024-06-28 DIAGNOSIS — Z00.00 ANNUAL PHYSICAL EXAM: ICD-10-CM

## 2024-06-28 LAB
CHOLEST SERPL-MCNC: 173 MG/DL (ref ?–200)
FASTING PATIENT LIPID ANSWER: YES
HDLC SERPL-MCNC: 48 MG/DL (ref 40–59)
LDLC SERPL CALC-MCNC: 111 MG/DL (ref ?–100)
NONHDLC SERPL-MCNC: 125 MG/DL (ref ?–130)
TRIGL SERPL-MCNC: 75 MG/DL (ref 30–149)
VIT D+METAB SERPL-MCNC: 26.7 NG/ML (ref 30–100)
VLDLC SERPL CALC-MCNC: 13 MG/DL (ref 0–30)

## 2024-06-28 PROCEDURE — 82306 VITAMIN D 25 HYDROXY: CPT

## 2024-06-28 PROCEDURE — 99395 PREV VISIT EST AGE 18-39: CPT | Performed by: FAMILY MEDICINE

## 2024-06-28 PROCEDURE — 36415 COLL VENOUS BLD VENIPUNCTURE: CPT

## 2024-06-28 PROCEDURE — 80061 LIPID PANEL: CPT

## 2024-06-28 RX ORDER — VALACYCLOVIR HYDROCHLORIDE 1 G/1
1000 TABLET, FILM COATED ORAL EVERY 12 HOURS SCHEDULED
Qty: 30 TABLET | Refills: 3 | Status: SHIPPED | OUTPATIENT
Start: 2024-06-28

## 2024-06-28 NOTE — H&P
HPI:    Karen Espinosa is a 38 year old female presents to clinic for annual physical exam.  Overall, doing well.  No acute concerns.  Reports an increase in stress/anxiety, interested in speaking with therapist.  Normal appetite, trying to eat healthy foods.  Normal bowel movements and urination.  Trying to increase exercise.  Sometimes she has difficulty staying asleep, attributes this to stress.  Patient's last menstrual period was 2024 (exact date).  Recent miscarriage, still monitoring FBG levels.    HISTORY:  Past Medical History:    Anxiety    Decorative tattoo    Frequent UTI    History of chicken pox      Past Surgical History:   Procedure Laterality Date            Family History   Problem Relation Age of Onset    Other (Other) Mother         hysterectomy-menorrhagia    Cancer Maternal Aunt         Dx with breast cancer in her 40's and passed in 50's.    Ovarian Cancer Maternal Aunt 40    Breast Cancer Maternal Aunt 40    Uterine Cancer Maternal Aunt       Social History:   Social History     Socioeconomic History    Marital status: Single   Tobacco Use    Smoking status: Never    Smokeless tobacco: Never   Vaping Use    Vaping status: Never Used   Substance and Sexual Activity    Alcohol use: Not Currently     Comment: socially    Drug use: Never    Sexual activity: Yes     Partners: Male     Comment: same partner     Social Determinants of Health     Financial Resource Strain: Low Risk  (3/20/2024)    Financial Resource Strain     Difficulty of Paying Living Expenses: Not hard at all     Med Affordability: No   Food Insecurity: No Food Insecurity (3/20/2024)    Food Insecurity     Food Insecurity: Never true   Transportation Needs: No Transportation Needs (3/20/2024)    Transportation Needs     Lack of Transportation: No   Stress: No Stress Concern Present (3/20/2024)    Stress     Feeling of Stress : No   Housing Stability: Low Risk  (3/20/2024)    Housing Stability     Housing  Instability: No        Medications (Active prior to today's visit):  Current Outpatient Medications   Medication Sig Dispense Refill    valACYclovir 1 G Oral Tab Take 1 tablet (1,000 mg total) by mouth every 12 (twelve) hours. 30 tablet 3    albuterol 108 (90 Base) MCG/ACT Inhalation Aero Soln Inhale 2 puffs into the lungs every 4 (four) hours as needed for Wheezing. 1 each 0    ergocalciferol 1.25 MG (28394 UT) Oral Cap Take 1 capsule (50,000 Units total) by mouth once a week. 12 capsule 0       Allergies:  Allergies   Allergen Reactions    Compazine [Prochlorperazine]          Depression Screening (PHQ-2/PHQ-9): Over the LAST 2 WEEKS   Little interest or pleasure in doing things: Not at all    Feeling down, depressed, or hopeless: Not at all    PHQ-2 SCORE: 0           ROS:   Review of Systems   All other systems reviewed and are negative.      PHYSICAL EXAM:     Vitals:    06/28/24 0923   BP: 120/84   BP Location: Right arm   Patient Position: Sitting   Cuff Size: adult   Pulse: 71   Resp: 17   SpO2: 97%   Weight: 221 lb (100.2 kg)   Height: 5' 2\" (1.575 m)     Physical Exam  Vitals reviewed.   Constitutional:       General: She is not in acute distress.  HENT:      Head: Normocephalic and atraumatic.      Right Ear: Tympanic membrane, ear canal and external ear normal.      Left Ear: Tympanic membrane, ear canal and external ear normal.      Nose: Nose normal.      Mouth/Throat:      Pharynx: Uvula midline.   Eyes:      Conjunctiva/sclera: Conjunctivae normal.      Pupils: Pupils are equal, round, and reactive to light.   Neck:      Thyroid: No thyromegaly.   Cardiovascular:      Rate and Rhythm: Normal rate and regular rhythm.      Heart sounds: Normal heart sounds. No murmur heard.  Pulmonary:      Effort: Pulmonary effort is normal. No respiratory distress.      Breath sounds: Normal breath sounds. No wheezing or rales.   Abdominal:      General: Bowel sounds are normal. There is no distension.       Palpations: Abdomen is soft.      Tenderness: There is no abdominal tenderness. There is no guarding or rebound.   Musculoskeletal:      Cervical back: Normal range of motion and neck supple.   Lymphadenopathy:      Cervical: No cervical adenopathy.   Neurological:      Mental Status: She is alert.         ASSESSMENT/PLAN:   (Z00.00) Annual physical exam  (primary encounter diagnosis)  Plan: Lipid Panel [E], Vitamin D [E]  - Immunizations UTD   - Reinforced healthy diet, lifestyle, and exercise.  - Past Medical/Social/Family histories reviewed  - Regular dental visits recommended   - Regular eye exams recommended     Health Maintenance   Topic Date Due    Pap Smear  08/05/2024       Follow up in 1 year or sooner if needed      (E55.9) Vitamin D deficiency  Plan:   - Previously deficient, currently taking weekly, high dose vitamin D. Level rechecked today     (F41.9) Anxiety  Plan: [facility] Referral - In Network  [facility] referral placed. Sleep hygiene discussed. Follow up in if symptoms worsen, don't improve.            Responsible party/patient verbalized understanding of information discussed. No barriers to learning observed.            Orders This Visit:  Orders Placed This Encounter   Procedures    Lipid Panel [E]    Vitamin D [E]       Meds This Visit:  Requested Prescriptions     Signed Prescriptions Disp Refills    valACYclovir 1 G Oral Tab 30 tablet 3     Sig: Take 1 tablet (1,000 mg total) by mouth every 12 (twelve) hours.       Imaging & Referrals:  OP REFERRAL TO [facility]     Chaperone offered at visit today.     The 21st Century cures Act makes medical notes like these available to patients in the interest of transparency.  However, be advised that this is a medical document.  It is intended as peer to peer communication.  It is written in medical language and may contain abbreviations or verbiage that are unfamiliar.  It may appear blunt or direct.  Medical documents are intended to carry relevant  information, facts as evident, and the clinical opinion of the practitioner.      This note was created by Dragon voice recognition. Errors in content may be related to improper recognition by the system; efforts to review and correct have been done but errors may still exist. Please contact me with any questions.       6/28/2024  Luis Arevalo MD

## 2024-07-01 ENCOUNTER — LAB ENCOUNTER (OUTPATIENT)
Dept: LAB | Age: 39
End: 2024-07-01
Attending: OBSTETRICS & GYNECOLOGY
Payer: MEDICAID

## 2024-07-01 DIAGNOSIS — O03.4 INCOMPLETE SPONTANEOUS ABORTION (HCC): ICD-10-CM

## 2024-07-01 LAB — B-HCG SERPL-ACNC: 3.3 MIU/ML

## 2024-07-01 PROCEDURE — 84702 CHORIONIC GONADOTROPIN TEST: CPT

## 2024-07-01 PROCEDURE — 36415 COLL VENOUS BLD VENIPUNCTURE: CPT

## 2024-07-02 ENCOUNTER — TELEPHONE (OUTPATIENT)
Age: 39
End: 2024-07-02

## 2024-07-02 NOTE — TELEPHONE ENCOUNTER
Good Afternoon -     I am glad that we were able to connect today. Here are some therapy resources that may be a good fit for you. Please verify your insurance coverage with any providers that you may choose to call and schedule with directly. If there is anything else I can assist with, give me a call at [phone]. If you need more immediate assistance, or assistance outside of business hours, please contact the [facility] 24/7 helpline at [phone].    [facility]  [address] [place] [address], [facility] [address]  [place] [address]  Phone: [phone]     [place] [address] [address]  Phone: [phone]    [facility]  [address] [facility] [place] [address]  Phone: [phone]      Britni Aiken (she/her/hers)  Patient Care Navigator Mental Health   [facility]ental [facility]  [phone] or 24/7 help line: [phone]  [email or web address]  Request an assessment or support »

## 2024-10-07 ENCOUNTER — IMMUNIZATION (OUTPATIENT)
Dept: LAB | Age: 39
End: 2024-10-07
Attending: EMERGENCY MEDICINE
Payer: MEDICAID

## 2024-10-07 DIAGNOSIS — Z23 NEED FOR VACCINATION: Primary | ICD-10-CM

## 2024-10-07 PROCEDURE — 90656 IIV3 VACC NO PRSV 0.5 ML IM: CPT

## 2024-10-07 PROCEDURE — 90471 IMMUNIZATION ADMIN: CPT

## 2024-10-31 ENCOUNTER — OFFICE VISIT (OUTPATIENT)
Dept: OBGYN CLINIC | Facility: CLINIC | Age: 39
End: 2024-10-31
Payer: MEDICAID

## 2024-10-31 VITALS
HEIGHT: 62 IN | HEART RATE: 77 BPM | DIASTOLIC BLOOD PRESSURE: 82 MMHG | WEIGHT: 222 LBS | SYSTOLIC BLOOD PRESSURE: 128 MMHG | BODY MASS INDEX: 40.85 KG/M2

## 2024-10-31 DIAGNOSIS — R19.00 PELVIC FULLNESS: ICD-10-CM

## 2024-10-31 DIAGNOSIS — Z01.419 ENCOUNTER FOR GYNECOLOGICAL EXAMINATION: Primary | ICD-10-CM

## 2024-10-31 PROCEDURE — 99395 PREV VISIT EST AGE 18-39: CPT | Performed by: OBSTETRICS & GYNECOLOGY

## 2024-11-01 LAB — HPV E6+E7 MRNA CVX QL NAA+PROBE: NEGATIVE

## 2024-11-01 NOTE — PROGRESS NOTES
Karen Espinosa is a 39 year old female  Patient's last menstrual period was 10/08/2024 (exact date). here for annual exam.       Last seen 3/26/2024.   Had SAB, followed by [facility].  Last on 2024 negative.      Last pap 2019 normal with negative HPV  Last mammogram 2024    Menses q month x 3-5 days.  LMP 10/8/24.   No contraception  Declined STD screen    Had left axilla swelling so had mammogram 2024-- needs left mammogram in 6 months    Having some sharp pain that radiates down to leg    OBSTETRICS HISTORY:  OB History    Para Term  AB Living   2 1 1 0 1 1   SAB IAB Ectopic Multiple Live Births   1 0 0 0 1       GYNE HISTORY   Pap Date: 19  Pap Result Notes: pap/hpv neg    MEDICAL HISTORY:  Past Medical History:    Anxiety    Decorative tattoo    Frequent UTI    History of chicken pox     Past Surgical History:   Procedure Laterality Date             SOCIAL HISTORY:  Social History     Socioeconomic History    Marital status: Single   Tobacco Use    Smoking status: Never    Smokeless tobacco: Never   Vaping Use    Vaping status: Never Used   Substance and Sexual Activity    Alcohol use: Not Currently     Comment: socially    Drug use: Never    Sexual activity: Yes     Partners: Male     Comment: same partner     Social Drivers of Health     Financial Resource Strain: Low Risk  (3/20/2024)    Financial Resource Strain     Difficulty of Paying Living Expenses: Not hard at all     Med Affordability: No   Food Insecurity: No Food Insecurity (3/20/2024)    Food Insecurity     Food Insecurity: Never true   Transportation Needs: No Transportation Needs (3/20/2024)    Transportation Needs     Lack of Transportation: No   Stress: No Stress Concern Present (3/20/2024)    Stress     Feeling of Stress : No   Housing Stability: Low Risk  (3/20/2024)    Housing Stability     Housing Instability: No       FAMILY HISTORY:  Family History   Problem Relation Age of Onset    Other (Other)  Mother         hysterectomy-menorrhagia    Cancer Maternal Aunt         Dx with breast cancer in her 40's and passed in 50's.    Ovarian Cancer Maternal Aunt 40    Breast Cancer Maternal Aunt 40    Uterine Cancer Maternal Aunt        MEDICATIONS:  Current Outpatient Medications   Medication Sig Dispense Refill    valACYclovir 1 G Oral Tab Take 1 tablet (1,000 mg total) by mouth every 12 (twelve) hours. 30 tablet 3       ALLERGIES:  Allergies[1]      Depression Screening (PHQ-2/PHQ-9): Over the LAST 2 WEEKS   Little interest or pleasure in doing things (over the last two weeks)?: Not at all    Feeling down, depressed, or hopeless (over the last two weeks)?: Not at all    PHQ-2 SCORE: 0           Review of Systems:  Constitutional:  Denies fatigue, night sweats, hot flashes  Eyes:  denies blurred or double vision  Cardiovascular:  denies chest pain or palpitations  Respiratory:  denies shortness of breath  Gastrointestinal:  denies heartburn, abdominal pain, diarrhea or constipation  Genitourinary:  denies dysuria, incontinence, abnormal vaginal discharge, vaginal itching  Musculoskeletal:  denies back pain.  Skin/Breast:  Denies any breast pain, lumps, or discharge.   Neurological:  denies headaches, extremity weakness or numbness.  Psychiatric: denies depression or anxiety.  Endocrine:   denies excessive thirst or urination.  Heme/Lymph:  easy bruising or bleeding.    PHYSICAL EXAM:   /82   Pulse 77   Ht 5' 2\" (1.575 m)   Wt 222 lb (100.7 kg)   LMP 10/08/2024 (Exact Date)   BMI 40.60 kg/m²   Wt Readings from Last 2 Encounters:   10/31/24 222 lb (100.7 kg)   06/28/24 221 lb (100.2 kg)     Body mass index is 40.6 kg/m².    Constitutional: well developed, well nourished  Neck/Thyroid: thyroid symmetric, no nodules  Heart:  Regular rate and rhythm  Lungs:  Clear to asculation  Breast: normal without palpable masses, tenderness, asymmetry, nipple discharge, nipple retraction or skin changes  Abdomen:  soft,  nontender, nondistended, no masses  Psychiatric:  Oriented to time, place, person and situation. Appropriate mood and affect    Pelvic Exam:  External Genitalia: normal appearance, hair distribution, and no lesions  Urethral Meatus:  normal in size, location, without lesions and prolapse  Bladder:  No fullness, masses or tenderness  Vagina:  Normal appearance without lesions, no abnormal discharge  Cervix:  Normal without tenderness on motion  Uterus: normal in size, contour, position, mobility, without tenderness  Adnexa: normal without masses or tenderness.  Fullness of right adnexa  Perineum/anus: normal      Assessment & Plan:    Karen Espinosa is a 39 year old female who presents for an annual physical exam.    1. Encounter for gynecological examination  Pap and HPV.   Will do Pap/HPV q 3 years.   Annual exams encouraged.  Order for mammogram given by PCP.   RTC 1 year or prn     2. Pelvic fullness  Order for pelvic ultrasound        Requested Prescriptions      No prescriptions requested or ordered in this encounter         Susie Bess MD  11/1/2024  9:33 AM         [1]   Allergies  Allergen Reactions    Compazine [Prochlorperazine]

## 2024-11-11 ENCOUNTER — TELEPHONE (OUTPATIENT)
Dept: OBGYN CLINIC | Facility: CLINIC | Age: 39
End: 2024-11-11

## 2024-11-11 NOTE — TELEPHONE ENCOUNTER
Pt calling to report +HPT and to establish PN care. Pt delivered with our office with first pregnancy (she also has hx of SAB), and still agrees to seeing all providers. Reminded pt that the doctor that is on-call the day that she delivers is the one that will deliver her. Pt verbalized understanding.    Pt states her LMP 11/26/24 (has reg 28-32d cycles, lasting 3-5d).    Patient informed the OB Nurse Education schedule not available yet, but RN will reach out to her once it opens to assist in scheduling appt. Pt verbalized understanding. Pt will be 7 weeks on Thanksgiving week. Pt is unavailable on Wednesday 11/27, but available any other day that week and the following week.

## 2024-11-11 NOTE — TELEPHONE ENCOUNTER
Patient request for a nurse to call to schedule missed menses appointment.  Last period on 10/08/2024

## 2024-11-13 NOTE — TELEPHONE ENCOUNTER
Last menstrual period 10/8/24, patient reports regular cycles.  Patient accepts appointments for OB Nurse Education on 11/26, and New OB on 12/10.

## 2024-11-22 ENCOUNTER — HOSPITAL ENCOUNTER (OUTPATIENT)
Age: 39
Discharge: HOME | End: 2024-11-22
Payer: MEDICAID

## 2024-11-22 VITALS
SYSTOLIC BLOOD PRESSURE: 136 MMHG | RESPIRATION RATE: 20 BRPM | TEMPERATURE: 98 F | HEART RATE: 73 BPM | DIASTOLIC BLOOD PRESSURE: 84 MMHG | OXYGEN SATURATION: 99 %

## 2024-11-22 DIAGNOSIS — J02.9 ACUTE PHARYNGITIS, UNSPECIFIED ETIOLOGY: Primary | ICD-10-CM

## 2024-11-22 DIAGNOSIS — R05.1 ACUTE COUGH: ICD-10-CM

## 2024-11-22 LAB
S PYO AG THROAT QL: NEGATIVE
SARS-COV-2 RNA RESP QL NAA+PROBE: NOT DETECTED

## 2024-11-22 PROCEDURE — 87880 STREP A ASSAY W/OPTIC: CPT | Performed by: NURSE PRACTITIONER

## 2024-11-22 PROCEDURE — 99213 OFFICE O/P EST LOW 20 MIN: CPT | Performed by: NURSE PRACTITIONER

## 2024-11-22 PROCEDURE — U0002 COVID-19 LAB TEST NON-CDC: HCPCS | Performed by: NURSE PRACTITIONER

## 2024-11-22 NOTE — ED INITIAL ASSESSMENT (HPI)
Pt states having a sore throat that began yesterday woke up with swelling on left side tonsil. Pt denies cough body aches or fevers. Pt states son was recently sick.

## 2024-11-22 NOTE — ED PROVIDER NOTES
Patient Seen in: [facility]      History     Chief Complaint   Patient presents with    Sore Throat     Stated Complaint: sore throat, runny nose    Subjective:   HPI  Patient is an 39-year-old female that presents to the immediate care center today with concern for sore throat that started yesterday. Pt's son was recently ill. Pt. has been eating and drinking without difficulty.  She has had no shortness of air; no abdominal or back pain; no headache or dizziness.          Objective:     Past Medical History:    Anxiety    Decorative tattoo    Frequent UTI    History of chicken pox              Past Surgical History:   Procedure Laterality Date                      Social History     Socioeconomic History    Marital status: Single   Tobacco Use    Smoking status: Never    Smokeless tobacco: Never   Vaping Use    Vaping status: Never Used   Substance and Sexual Activity    Alcohol use: Not Currently     Comment: socially    Drug use: Never    Sexual activity: Yes     Partners: Male     Comment: same partner     Social Drivers of Health     Financial Resource Strain: Low Risk  (3/20/2024)    Financial Resource Strain     Difficulty of Paying Living Expenses: Not hard at all     Med Affordability: No   Food Insecurity: No Food Insecurity (3/20/2024)    Food Insecurity     Food Insecurity: Never true   Transportation Needs: No Transportation Needs (3/20/2024)    Transportation Needs     Lack of Transportation: No   Stress: No Stress Concern Present (3/20/2024)    Stress     Feeling of Stress : No   Housing Stability: Low Risk  (3/20/2024)    Housing Stability     Housing Instability: No              Review of Systems   Constitutional:  Negative for appetite change, chills and fever.   HENT:  Positive for sore throat. Negative for congestion and sinus pressure.    Respiratory:  Negative for cough.    Gastrointestinal:  Negative for abdominal pain.   Musculoskeletal:  Negative for arthralgias and  myalgias.   Skin:  Negative for rash.   Neurological:  Negative for dizziness, weakness and headaches.       Positive for stated complaint: sore throat, runny nose  Other systems are as noted in HPI.  Constitutional and vital signs reviewed.      All other systems reviewed and negative except as noted above.    Physical Exam     ED Triage Vitals [11/22/24 1053]   /84   Pulse 73   Resp 20   Temp 98.4 °F (36.9 °C)   Temp src Oral   SpO2 99 %   O2 Device None (Room air)       Current Vitals:   Vital Signs  BP: 136/84  Pulse: 73  Resp: 20  Temp: 98.4 °F (36.9 °C)  Temp src: Oral    Oxygen Therapy  SpO2: 99 %  O2 Device: None (Room air)        Physical Exam  Vitals and nursing note reviewed.   Constitutional:       General: She is not in acute distress.     Appearance: She is not ill-appearing.   HENT:      Right Ear: Tympanic membrane and ear canal normal.      Left Ear: Tympanic membrane and ear canal normal.      Nose: Nose normal.      Mouth/Throat:      Pharynx: No posterior oropharyngeal erythema.      Tonsils: No tonsillar exudate or tonsillar abscesses. 1+ on the right. 1+ on the left.   Eyes:      Conjunctiva/sclera: Conjunctivae normal.   Pulmonary:      Effort: Pulmonary effort is normal. No respiratory distress.   Musculoskeletal:      Cervical back: Normal range of motion and neck supple.   Skin:     General: Skin is warm and dry.      Findings: No rash.   Neurological:      Mental Status: She is alert and oriented to person, place, and time.             ED Course     Labs Reviewed   POCT RAPID STREP - Normal   RAPID SARS-COV-2 BY PCR - Normal                   MDM      Patient was offered viscous lidocaine prescription.  She declined.            Medical Decision Making  Diff dx: Viral vs bacterial pharyngitis reviewed with patient, peritonsillar abscess considered.      Problems Addressed:  Acute pharyngitis, unspecified etiology: self-limited or minor problem    Amount and/or Complexity of Data  Reviewed  Labs:  Decision-making details documented in ED Course.    Risk  OTC drugs.        Disposition and Plan     Clinical Impression:  1. Acute pharyngitis, unspecified etiology    2. Acute cough         Disposition:  Discharge  11/22/2024 11:12 am    Follow-up:  Luis Arevalo MD  [address] [place] [address]  [phone]      As needed          Medications Prescribed:  Current Discharge Medication List              Supplementary Documentation:

## 2024-11-26 ENCOUNTER — NURSE ONLY (OUTPATIENT)
Dept: OBGYN CLINIC | Facility: CLINIC | Age: 39
End: 2024-11-26
Payer: MEDICAID

## 2024-11-26 DIAGNOSIS — Z34.81 ENCOUNTER FOR SUPERVISION OF OTHER NORMAL PREGNANCY IN FIRST TRIMESTER (HCC): Primary | ICD-10-CM

## 2024-11-26 RX ORDER — CHOLECALCIFEROL (VITAMIN D3) 25 MCG
1 TABLET,CHEWABLE ORAL DAILY
COMMUNITY

## 2024-11-26 NOTE — PROGRESS NOTES
Pt seen for OBN appt today with no complaints. Patient with last menstrual period 10/8/2024. Normal PN labs ordered plus 1 hr gtt, varicella and HCG qual. Pt advised all labs must be completed and resulted prior to NPN appt. If labs are not completed and resulted the NPN appt will be cancelled. Pt informed again of both male and female providers and the need to rotate PN appt with all providers since OB on-call will be the one that delivers her. Assisted pt with scheduling NPN appt with MD.       Height: 5' 1/2''  Weight: 220 lbs  BMI: 39.6    Partner's name is Danish contact #[phone]; race:   Occupation: Not currently working    MEDICAL HISTORY    Anemia No    Anesthetic complications No    Anxiety/Depression  Yes Anxiety (no meds)   Autoimmune Disorder No    Asthma  No    Cancer No    Diabetes  No    Gyne/breast Surgery Yes C/s 7/8/2021   Heart Disease No    Hepatitis/Liver Disease  No    History of blood transfusion No    History of abnormal pap No    Hypertension  Yes Pre-eclampsia with 1st pregnancy (developed in 3rd trimester)   Infertility  No    Kidney Disease/Frequent UTIs  Yes Hx frequent UTIs   Medication Allergies Yes Compazine (muscle spasms in mouth)   Latex Allergies No    Food Allergies  No    Neurological Disorder/Epilepsy No    Operations/Hospitalizations Yes C/s 7/8/2021   TB exposure  No    Thyroid Dysfunction No    Trauma/Violence  No    Uterine Anomaly  No    Uterine Fibroids  Yes    Variocosities/DVTs Yes Varicose veins on bilateral legs   Smoker No    Drug usage in prior year No    Alcohol No    Would you accept a blood transfusion? If no, are you a Mandaen? Yes                INFECTION HISTORY    Chlamydia No    Pt or partner have hx of Genital Herpes No    Gonorrhea No    Hepatitis B No    HIV No    HPV No    MRSA No    Syphilis No    Tattoos Yes 3 tattoos   Live with someone or Exposed to TB No    Rash or viral illness since LMP  No    Varicella Yes Age 7   Pets No         GENETICS SCREENING    Genetic Screening    Genetic Screening/Teratology Counseling- Includes patient, baby's father, or anyone in either family with:  Patient's age 35 years or older as of estimated date of delivery: Yes     Thalassemia (Italian, Greek, Mediterranean, or  background): MCV less than 80: No     Neural tube defect (Meningomyelocele, Spina bifida, or Anencephaly): No     Congenital heart defect: No     Down syndrome: Yes (Comment: Pt's maternal uncle has Down Syndrome)     David-Sachs (Ashkenazi Hoahaoism, Cajun, Chinese [place]): No     Canavan disease (Ashkenazi Hoahaoism): No     Familial dysautonomia (Ashkenazi Hoahaoism): No     Sickle cell disease or trait (): No     Hemophilia or other blood disorders: No     Muscular dystrophy: No    Cystic fibrosis: No     Burlington's chorea: No     Intellectual disability and/or autism: No     Other inherited genetic or chromosomal disorder: No     Maternal metabolic disorder (eg. Type 1 diabetes, PKU): No     Patient or baby's father had child with birth defects not listed above: No     Recurrent pregnancy loss, or a stillbirth: Yes (Comment: Pt with SAB x1)     Medications (including supplements, vitamins, herbs, or OTC drugs)/illicit/recreational drugs/alcohol since last menstrual period: Yes (Comment: pnv, valtrex PRN for cold sores)                 MISC    Infant vaccinations  Yes    Pt. Has answered NO 5P questions and has NO  risk factors.    Pt. Given What pregnant women need to know handout.

## 2024-12-03 ENCOUNTER — LAB ENCOUNTER (OUTPATIENT)
Dept: LAB | Age: 39
End: 2024-12-03
Attending: OBSTETRICS & GYNECOLOGY
Payer: MEDICAID

## 2024-12-03 DIAGNOSIS — Z34.81 ENCOUNTER FOR SUPERVISION OF OTHER NORMAL PREGNANCY IN FIRST TRIMESTER (HCC): ICD-10-CM

## 2024-12-03 LAB
ANTIBODY SCREEN: NEGATIVE
BASOPHILS # BLD AUTO: 0.05 X10(3) UL (ref 0–0.2)
BASOPHILS NFR BLD AUTO: 0.6 %
DEPRECATED RDW RBC AUTO: 37.2 FL (ref 35.1–46.3)
EOSINOPHIL # BLD AUTO: 0.1 X10(3) UL (ref 0–0.7)
EOSINOPHIL NFR BLD AUTO: 1.3 %
ERYTHROCYTE [DISTWIDTH] IN BLOOD BY AUTOMATED COUNT: 11.4 % (ref 11–15)
GLUCOSE 1H P GLC SERPL-MCNC: 135 MG/DL
HBV SURFACE AG SER-ACNC: <0.1 [IU]/L
HBV SURFACE AG SERPL QL IA: NONREACTIVE
HCG SERPL QL: POSITIVE
HCT VFR BLD AUTO: 38 %
HCV AB SERPL QL IA: NONREACTIVE
HGB BLD-MCNC: 13.3 G/DL
IMM GRANULOCYTES # BLD AUTO: 0.03 X10(3) UL (ref 0–1)
IMM GRANULOCYTES NFR BLD: 0.4 %
LYMPHOCYTES # BLD AUTO: 1.69 X10(3) UL (ref 1–4)
LYMPHOCYTES NFR BLD AUTO: 21.2 %
MCH RBC QN AUTO: 30.9 PG (ref 26–34)
MCHC RBC AUTO-ENTMCNC: 35 G/DL (ref 31–37)
MCV RBC AUTO: 88.2 FL
MONOCYTES # BLD AUTO: 0.33 X10(3) UL (ref 0.1–1)
MONOCYTES NFR BLD AUTO: 4.1 %
NEUTROPHILS # BLD AUTO: 5.78 X10 (3) UL (ref 1.5–7.7)
NEUTROPHILS # BLD AUTO: 5.78 X10(3) UL (ref 1.5–7.7)
NEUTROPHILS NFR BLD AUTO: 72.4 %
PLATELET # BLD AUTO: 230 10(3)UL (ref 150–450)
RBC # BLD AUTO: 4.31 X10(6)UL
RH BLOOD TYPE: NEGATIVE
RUBV IGG SER QL: POSITIVE
RUBV IGG SER-ACNC: 47 IU/ML (ref 10–?)
T PALLIDUM AB SER QL IA: NONREACTIVE
WBC # BLD AUTO: 8 X10(3) UL (ref 4–11)

## 2024-12-03 PROCEDURE — 86850 RBC ANTIBODY SCREEN: CPT

## 2024-12-03 PROCEDURE — 82950 GLUCOSE TEST: CPT

## 2024-12-03 PROCEDURE — 87340 HEPATITIS B SURFACE AG IA: CPT

## 2024-12-03 PROCEDURE — 86803 HEPATITIS C AB TEST: CPT

## 2024-12-03 PROCEDURE — 87389 HIV-1 AG W/HIV-1&-2 AB AG IA: CPT

## 2024-12-03 PROCEDURE — 84703 CHORIONIC GONADOTROPIN ASSAY: CPT

## 2024-12-03 PROCEDURE — 87086 URINE CULTURE/COLONY COUNT: CPT

## 2024-12-03 PROCEDURE — 86900 BLOOD TYPING SEROLOGIC ABO: CPT

## 2024-12-03 PROCEDURE — 36415 COLL VENOUS BLD VENIPUNCTURE: CPT

## 2024-12-03 PROCEDURE — 86780 TREPONEMA PALLIDUM: CPT

## 2024-12-03 PROCEDURE — 85025 COMPLETE CBC W/AUTO DIFF WBC: CPT

## 2024-12-03 PROCEDURE — 86762 RUBELLA ANTIBODY: CPT

## 2024-12-03 PROCEDURE — 86787 VARICELLA-ZOSTER ANTIBODY: CPT

## 2024-12-03 PROCEDURE — 86901 BLOOD TYPING SEROLOGIC RH(D): CPT

## 2024-12-04 LAB — VZV IGG SER IA-ACNC: 2.29 (ref 1–?)

## 2024-12-05 ENCOUNTER — TELEPHONE (OUTPATIENT)
Dept: OBGYN CLINIC | Facility: CLINIC | Age: 39
End: 2024-12-05

## 2024-12-05 DIAGNOSIS — O99.810 ABNORMAL GLUCOSE TOLERANCE TEST (GTT) DURING PREGNANCY, ANTEPARTUM (HCC): Primary | ICD-10-CM

## 2024-12-06 NOTE — TELEPHONE ENCOUNTER
Order placed.  Patient informed of results and recommendations for 3 hr gtt. Patient advised to fast for 12 hours and to schedule an appointment for this test.

## 2024-12-06 NOTE — TELEPHONE ENCOUNTER
----- Message from Susie Bess sent at 12/5/2024  9:59 AM CST -----  1 hr glucola 135.  Needs 3 hr GTT

## 2024-12-10 ENCOUNTER — INITIAL PRENATAL (OUTPATIENT)
Dept: OBGYN CLINIC | Facility: CLINIC | Age: 39
End: 2024-12-10
Payer: MEDICAID

## 2024-12-10 ENCOUNTER — TELEPHONE (OUTPATIENT)
Dept: OBGYN CLINIC | Facility: CLINIC | Age: 39
End: 2024-12-10

## 2024-12-10 VITALS
DIASTOLIC BLOOD PRESSURE: 89 MMHG | SYSTOLIC BLOOD PRESSURE: 133 MMHG | HEART RATE: 101 BPM | WEIGHT: 222 LBS | BODY MASS INDEX: 41 KG/M2

## 2024-12-10 DIAGNOSIS — Z34.81 ENCOUNTER FOR SUPERVISION OF OTHER NORMAL PREGNANCY IN FIRST TRIMESTER (HCC): Primary | ICD-10-CM

## 2024-12-10 DIAGNOSIS — O09.521 AMA (ADVANCED MATERNAL AGE) MULTIGRAVIDA 35+, FIRST TRIMESTER (HCC): Primary | ICD-10-CM

## 2024-12-10 PROBLEM — Z87.59 HISTORY OF PRE-ECLAMPSIA: Status: ACTIVE | Noted: 2024-12-10

## 2024-12-10 PROBLEM — Z82.79 FAMILY HISTORY OF DOWNS SYNDROME: Status: ACTIVE | Noted: 2024-12-10

## 2024-12-10 PROBLEM — Z98.891 HISTORY OF CESAREAN DELIVERY: Status: ACTIVE | Noted: 2024-12-10

## 2024-12-10 LAB
APPEARANCE: CLEAR
GLUCOSE (URINE DIPSTICK): NEGATIVE MG/DL
MULTISTIX LOT#: ABNORMAL NUMERIC
NITRITE, URINE: NEGATIVE
PROTEIN (URINE DIPSTICK): 30 MG/DL
URINE-COLOR: YELLOW

## 2024-12-10 PROCEDURE — 76801 OB US < 14 WKS SINGLE FETUS: CPT | Performed by: OBSTETRICS & GYNECOLOGY

## 2024-12-10 PROCEDURE — 0500F INITIAL PRENATAL CARE VISIT: CPT | Performed by: OBSTETRICS & GYNECOLOGY

## 2024-12-10 PROCEDURE — 81002 URINALYSIS NONAUTO W/O SCOPE: CPT | Performed by: OBSTETRICS & GYNECOLOGY

## 2024-12-10 NOTE — PROGRESS NOTES
New OB. Pap UTD; GC/C collected.Needs 3 hr GTT. Elevated protein on 24 hr urine. Start bASA at 12 weeks. Requesting genetic screening. US SLIUP with size CWD. RTC 4 wks

## 2024-12-10 NOTE — TELEPHONE ENCOUNTER
Pt wishes / needs the following. Please do referral & send MFM order for:    [ x ]  Genetic Screening    DX:  AMA, Obesity    [  ]  medical consult    [  ]  16 wk cervical length    [ x ]  level 2 ultrasound    [   ] fetal echo    [ x ]  growth ultrasound at 32 wks    [  ]  serial ultrasound    [ x ]  NSTs once 36 weeks

## 2024-12-11 LAB
C TRACH DNA SPEC QL NAA+PROBE: NEGATIVE
N GONORRHOEA DNA SPEC QL NAA+PROBE: NEGATIVE
T VAGINALIS RRNA SPEC QL NAA+PROBE: NEGATIVE

## 2024-12-13 ENCOUNTER — LABORATORY ENCOUNTER (OUTPATIENT)
Dept: LAB | Facility: HOSPITAL | Age: 39
End: 2024-12-13
Attending: OBSTETRICS & GYNECOLOGY
Payer: MEDICAID

## 2024-12-13 DIAGNOSIS — O99.810 ABNORMAL GLUCOSE TOLERANCE TEST (GTT) DURING PREGNANCY, ANTEPARTUM (HCC): ICD-10-CM

## 2024-12-13 LAB
GLUCOSE 1H P GLC SERPL-MCNC: 153 MG/DL
GLUCOSE 2H P GLC SERPL-MCNC: 133 MG/DL
GLUCOSE 3H P GLC SERPL-MCNC: 86 MG/DL (ref 70–140)
GLUCOSE P FAST SERPL-MCNC: 86 MG/DL

## 2024-12-13 PROCEDURE — 82951 GLUCOSE TOLERANCE TEST (GTT): CPT

## 2024-12-13 PROCEDURE — 36415 COLL VENOUS BLD VENIPUNCTURE: CPT

## 2024-12-13 PROCEDURE — 82952 GTT-ADDED SAMPLES: CPT

## 2024-12-26 NOTE — TELEPHONE ENCOUNTER
Patient states that she is feeling nauseous and urine is cloudy. Would like to know if you can prescribe her antibiotics since UA results were abnormal. Please advise. No

## 2025-01-02 ENCOUNTER — HOSPITAL ENCOUNTER (OUTPATIENT)
Dept: PERINATAL CARE | Facility: HOSPITAL | Age: 40
Discharge: HOME | End: 2025-01-02
Attending: OBSTETRICS & GYNECOLOGY
Payer: MEDICAID

## 2025-01-02 VITALS
SYSTOLIC BLOOD PRESSURE: 131 MMHG | HEART RATE: 97 BPM | DIASTOLIC BLOOD PRESSURE: 81 MMHG | BODY MASS INDEX: 42 KG/M2 | WEIGHT: 229 LBS

## 2025-01-02 DIAGNOSIS — Z87.59 HISTORY OF PRE-ECLAMPSIA: ICD-10-CM

## 2025-01-02 DIAGNOSIS — Z36.9 ENCOUNTER FOR ANTENATAL SCREENING OF MOTHER (HCC): ICD-10-CM

## 2025-01-02 DIAGNOSIS — O09.521 MULTIGRAVIDA OF ADVANCED MATERNAL AGE IN FIRST TRIMESTER (HCC): ICD-10-CM

## 2025-01-02 DIAGNOSIS — Z82.79 FAMILY HISTORY OF DOWNS SYNDROME: ICD-10-CM

## 2025-01-02 DIAGNOSIS — Z36.9 ENCOUNTER FOR ANTENATAL SCREENING OF MOTHER (HCC): Primary | ICD-10-CM

## 2025-01-02 DIAGNOSIS — R03.0 ELEVATED BP WITHOUT DIAGNOSIS OF HYPERTENSION: ICD-10-CM

## 2025-01-02 DIAGNOSIS — O09.521 AMA (ADVANCED MATERNAL AGE) MULTIGRAVIDA 35+, FIRST TRIMESTER (HCC): ICD-10-CM

## 2025-01-02 DIAGNOSIS — Z98.891 HISTORY OF CESAREAN DELIVERY: ICD-10-CM

## 2025-01-02 PROCEDURE — 36415 COLL VENOUS BLD VENIPUNCTURE: CPT

## 2025-01-02 PROCEDURE — 76801 OB US < 14 WKS SINGLE FETUS: CPT | Performed by: OBSTETRICS & GYNECOLOGY

## 2025-01-02 NOTE — PROGRESS NOTES
Outpatient Maternal-Fetal Medicine Consultation    Dear Dr. Deng    Thank you for requesting ultrasound evaluation and maternal fetal medicine consultation on your patient Karen Espinosa.  As you are aware she is a 39 year old female  with a kim pregnancy and an Estimated Date of Delivery: 7/15/25.  A maternal-fetal medicine consultation was requested secondary to Advanced Maternal Age and Obesity, morbid.  Her prenatal records and labs were reviewed.    Prior pregnancy had term preeclampsia.  ROS    HISTORY  OB History    Para Term  AB Living   3 1 1 0 1 1   SAB IAB Ectopic Multiple Live Births   1 0 0 0 1      # Outcome Date GA Lbr Jonas/2nd Weight Sex Type Anes PTL Lv   3 Current            2 SAB 24 10w2d   X SAB None     1 Term 21 37w3d  6 lb 5.2 oz (2.87 kg) M Caesarean Spinal N AUDRA      Complications: Preeclampsia       Allergies:  Allergies[1]   Current Meds:  Current Outpatient Medications   Medication Sig Dispense Refill    prenatal vitamin with DHA 27-0.8-228 MG Oral Cap Take 1 capsule by mouth daily.      valACYclovir 1 G Oral Tab Take 1 tablet (1,000 mg total) by mouth every 12 (twelve) hours. (Patient not taking: Reported on 2024) 30 tablet 3        HISTORY:  Past Medical History:    Anxiety    Decorative tattoo    Frequent UTI    Used to have 2 UTIs yearly but has since resolved    History of chicken pox    7 years old      Past Surgical History:   Procedure Laterality Date      2021      Family History   Problem Relation Age of Onset    Other (Other) Mother         hysterectomy-menorrhagia    Hypertension Mother     Cancer Maternal Aunt         Dx with breast cancer in her 40's and passed in 50's.    Ovarian Cancer Maternal Aunt 40        Hysterectomy    Brain Cancer Maternal Uncle       Social History     Socioeconomic History    Marital status: Single   Tobacco Use    Smoking status: Never    Smokeless tobacco: Never   Vaping Use    Vaping  status: Never Used   Substance and Sexual Activity    Alcohol use: Not Currently    Drug use: Never    Sexual activity: Yes     Partners: Male     Comment: same partner     Social Drivers of Health     Financial Resource Strain: Low Risk  (12/8/2024)    Financial Resource Strain     Difficulty of Paying Living Expenses: Not hard at all     Med Affordability: No   Food Insecurity: No Food Insecurity (12/8/2024)    Food Insecurity     Food Insecurity: Never true   Transportation Needs: No Transportation Needs (12/8/2024)    Transportation Needs     Lack of Transportation: No   Stress: No Stress Concern Present (12/8/2024)    Stress     Feeling of Stress : No   Housing Stability: Low Risk  (12/8/2024)    Housing Stability     Housing Instability: No          PHYSICAL EXAMINATION:  /81   Pulse 97   Wt 229 lb (103.9 kg)   LMP 10/08/2024 (Exact Date)   BMI 41.88 kg/m²   Physical Exam  Constitutional:       Appearance: Normal appearance.   Abdominal:      Palpations: Abdomen is soft.      Tenderness: There is no abdominal tenderness.   Neurological:      Mental Status: She is alert.   Psychiatric:         Mood and Affect: Mood normal.         Behavior: Behavior normal.           OBSTETRIC ULTRASOUND  The patient had a first trimester ultrasound today which revealed normal first trimester anatomy with size consistent with dates.  The NT measurement was: 1.4 mm; this is within normal limits.  The nasal bone is present.  Suboptimal view: limited by maternal body habitus    Single IUP with cardiac activity 146 bpm  Amniotic fluid is normal.  Placenta location is left lateral  The CRL is consistent with gestational age. Nasal bone present. The nuchal translucency measures 1.4 mm. This is within normal limits.   The maternal uterus and ovaries appear normal.    Uterus largest Fibroid: Size 61 mm x 52 mm x 94 mm- fundal right calcified    See PACS/Imaging Tab For Complete Ultrasound Report  I interpreted the results and  reviewed them with the patient.    DISCUSSION  During her visit we discussed and reviewed the following issues:  ADVANCED MATERNAL AGE    Background  I reviewed with the patient that pregnancies in women of advanced maternal age (35 or older at delivery) are associated with elevated risks. Specifically, there is a higher rate of:  Fetal malformations  Preeclampsia  Gestational diabetes  Intrauterine fetal death    As a result, enhanced pregnancy surveillance is advised for these patients including a comprehensive ultrasound to assess for fetal malformations (at 20 weeks) and a third trimester ultrasound assessment for fetal growth (at 32 weeks). In addition, weekly NST's (initiating at 36 weeks gestation for women 35-39 years and at 32 weeks gestation for women 40 years and older) are also advised. Routine obstetric care is more than adequate to assess for gestational diabetes and preeclampsia; hence, no further significant alterations in obstetric care are advised.    Medical Complications    Women 35 years of age or older can expect to experience two to three fold higher rates of hospitalization,  delivery, and pregnancy-related complications when compared to their younger counterparts.  The two most common medical problems complicating these  pregnanccies are hypertension and diabetes.   The incidence of preeclampsia in the general obstetric population is 3 to 4 percent; this increases to 5 to 10 percent in women over age 40 and is as high as 35 percent in women over age 50.   The incidence of gestational diabetes in the general obstetric population is 3 percent, rising to 7 to 12 percent in women over age 40 and 20 percent in women over age 50.  Women 35 years of age or older are more likely to be delivered by . The  delivery rate in the general obstetric population of the [place] is almost 30 percent, compared to almost 50 percent in women over age 40 to 45 and almost 80 percent in  women age 50 to 63.          Fetal Death        A decision analysis tool using data from the [employer] [employer]  [employer] predicted a strategy of weekly antepartum testing and labor induction would lower the risk of unexplained fetal death in women 35 years of age or older. In this model, weekly testing starting at 36 weeks of gestation would drop the risk of fetal death from 5.2 to 1.3 per 1000 pregnancies. While a policy of antepartum testing in older women does increase the chance that a women will be induced (71 inductions per fetal death averted) and thereby increases her risk of having a  delivery, only 14 additional cesareans would need to be performed to avert one unexplained fetal death.  Hence, weekly NST's are advised for women of advanced maternal age; testing should be initiated at 36 weeks for women 35-39 years and at 32 weeks for women 40 years and older.    Fetal Malformations    Cardiac malformations, clubfoot, and diaphragmatic hernia appear to occur with increased frequency in offspring of older women. These abnormalities are structural and unrelated to aneuploidy, thus they would not be detected by karyotype analysis.  For these reasons a complete, detailed ultrasound (level II) is advised even if the fetus has a normal karyotype.      Fetal Aneuploidy      Invasive Testing  I offered invasive genetic testing (amniocentesis, chorionic villus sampling) after reviewing the diagnostic accuracy of these tests as well as the procedure associated loss rate (1:500 for genetic amniocentesis).    She ultimately does not desire invasive genetic testing.     We discussed  the increased risk of chromosomal abnormalities associated with advanced maternal age at age  39 year old. She understands that ultrasound exam cannot exclude potential genetic abnormalities.  Her estimated risk based on maternal age at term with any chromosome abnormality is about 1: 120 and with Down Syndrome is about 1:  200.   We also discussed the risks and benefits of having  genetic testing (CVS and amniocentesis) performed.        Non-invasive Pregnancy Testing (NIPT)  I reviewed current non-invasive screening options. Currently non-invasive pregnancy testing (NIPT) offers the highest detection rate (with the lowest false positive rate) for the detection of fetal aneuploidy amongst high-risk patients. The limitations of detailed mid-trimester sonography was reviewed with the patient. First trimester screening and second trimester multiple-marker serum serum screening as alternative aneuploidy screening options were also reviewed. However, both of these tests are associated with lower detection and higher false positive rates.     NON-DIAGNOSTIC CELL-FREE FETAL DNA RESULTS  Discussion  1 to 5 percent of cfDNA tests do not yield a result, and obese women are at increased risk of receiving a test failure.  The patient has three options in this setting:  Repeat the cfDNA test, if allowed by the laboratory (some failures, like large regions of homozygosity or dizygotic twins, will always cause the test to fail and repeat testing is not an option). Repeat testing, when allowed, is successful in 50 to 80 percent of cases .  Standard serum marker/ultrasound screening.  Invasive procedure (amniocentesis, CVS) and diagnostic testing (karyotyping/microarray).             OBESITY:  Her BMI prior to pregnancy was 41  Obesity during pregnancy is associated with numerous maternal and  risks.  It is not clear whether obesity is a direct cause of adverse pregnancy outcome or whether the association between obesity and adverse pregnancy outcome is due to factors such as diabetes mellitus.   Data suggest that obese women should be encouraged to undertake a weight reduction program (diet, exercise, behavior modification, and possibly bariatric surgery in some cases) prior to attempting to conceive.            Subfertility in obese women  is most commonly related to ovulatory dysfunction, and, in some obese women, the ovulatory dysfunction is related to polycystic ovary syndrome (PCOS). It is also important to note that even among ovulatory women, increasing obesity is associated with decreasing spontaneous pregnancy rates.  The increased risk of miscarriage in obese women may be because such women often have PCOS or isolated insulin resistance.                 Due to its strong association with obesity in the general population, type 2 diabetes mellitus is one of the two most common medical complications of the obese . The increased risk of type 2 diabetes is primarily related to an exaggerated increase in insulin resistance in the obese state. It is reasonable to screen obese gravidas for undiagnosed pregestational diabetes in the first trimester.   Glucose intolerance associated with gestational diabetes generally resolves postpartum; however, obese women with a history of gestational diabetes have a two-fold increased prevalence of subsequent type 2 diabetes.           An association between obesity and hypertensive disorders during pregnancy has been consistently reported.  In particular, maternal weight and BMI are independent risk factors for preeclampsia.             Studies have found that the increased risk of  birth in obese gravidas is primarily associated with obesity-related medical and  complications, rather than an intrinsic predisposition to spontaneous  birth. Prevention of  birth in these patients, therefore, should be directed toward prevention or management of medical and obstetrical complications.               Both prepregnancy obesity and excessive maternal weight gain before or during pregnancy contribute to an increased probability of  delivery.  It has also been hypothesized that obesity may lead to dystocia due to increased soft tissue deposition in the maternal pelvis.     delivery in the obese  is associated with numerous perioperative concerns, including emergency delivery, prolonged incision to delivery interval, blood loss >1000 mL, longer operative times, wound infection, thromboembolism, and endometritis.            Maternal obesity appears to be associated with a small increase in the absolute rate of some congenital anomalies (primarily neural tube defect and cardiac), and the risk may increase with increasing maternal weight.  Level II ultrasound is advised for women with obesity.  The risk of neural tube defects increased significantly with maternal weight.    The analysis found that overweight and obese pregnant women experienced significantly more stillbirths than normal weight women.  Third trimester  testing is advised.          Preeclampsia refers to the new onset of hypertension and proteinuria after 20 weeks of gestation in a previously normotensive woman.  Preeclampsia occurs in approximately 3 to 14 percent of all pregnancies worldwide, and about 5 to 8 percent in the [place].  The pathogenesis of preeclampsia is incompletely understood, but the disorder is clearly initiated by the presence of the trophoblast, and impaired placental angiogenesis plays an important role.   The clinical manifestations of preeclampsia can appear anytime from the second trimester to the first few weeks postpartum. The majority of cases of preeclampsia arise in low-risk nulliparous women without medical complications or identifiable risk factors.   Women with early, severe preeclampsia are at greatest risk of recurrence (25 to 65 percent), the incidence is much lower (5 to 7 percent) in women who had mild preeclampsia during the first pregnancy.             The ability to predict preeclampsia is currently of limited benefit because neither the development of the disorder nor its progression from mild to severe disease can be prevented in most patients, and  there is no cure except delivery. Nevertheless, the accurate identification of women at risk, early diagnosis, and prompt and appropriate management will lead to an improvement in maternal, and possibly , outcome.               Risk factors for preeclampsia include: Nulliparity,  Preeclampsia in a previous pregnancy,  Age >40 years or <18 years,  Family history of pregnancy-induced hypertension, Chronic hypertension,  Chronic renal disease,  Antiphospholipid antibody syndrome or inherited thrombophilia, Vascular or connective tissue disease, Diabetes mellitus (pregestational and gestational),  Multifetal gestation, High body mass index,  Male partner whose previous partner had preeclampsia, Hydrops fetalis, and Unexplained fetal growth restriction.               The weight of evidence indicates that inherited thrombophilias (such as Factor V Leiden mutation, prothrombin gene mutation, protein C or S deficiency, antithrombin III deficiency) are not associated with preeclampsia. Therefore, screening pregnant women for inherited thrombophilias is not useful for predicting those at high risk of developing preeclampsia.  Antiphospholipid antibody syndrome is associated with development of severe early preeclampsia and screening is recommended.  Measurement of angiogenic factors (VEGF, PlGF, sFlt-1, Nolberto) in blood or urine is the most promising approach for predicting preeclampsia; however, these tests are investigational and are not available for clinical use at present.               Data from multiple observational studies suggest that preeclampsia predicts remote cardiovascular events (eg, hypertension, ischemic heart disease, stroke). Review of all of a woman's pregnancies is necessary to define her long-term risk accurately. Those with early onset severe preeclampsia, recurrent preeclampsia, gestational hypertension, or preeclampsia with onset as a multipara appear to be at highest risk of cardiovascular  disease later in life, including during the premenopausal period.   In contrast, preeclampsia/eclampsia occurring late in gestation in primigravid women and followed by a normotensive pregnancy does not appear to be associated with increased remote cardiovascular risk.          Many studies have been performed to try to find a way to prevent preeclampsia.  These include the use of fish oil, vitamin C, Vitamin E, calcium and aspirin.  A few studies have shown benefit with aspirin but others have not.  Aspirin therapy is not recommended for women at low-risk for development of preeclampsia. We suggest use of low dose aspirin in women at moderate to high risk of developing preeclampsia, but no therapy is a reasonable alternative.   No drug prevents progression to more severe disease; use of any drug in this setting is not recommended.              Prevention of Preeclampsia    Antiplatelet Agents  The observation that preeclampsia is associated with increased platelet turnover and increased platelet-derived thromboxane levels led to randomized trials evaluating low-dose aspirin therapy in women thought to be at increased risk of the disease. As opposed to higher dose aspirin therapy, low-dose aspirin (60 to 150 mg per day) diminishes platelet thromboxane synthesis while maintaining vascular wall prostacyclin synthesis. Although not well studied, the beneficial effect of low-dose aspirin for prevention of preeclampsia may also be in part related to modulation of inflammation. The drug has been studied for both prevention of preeclampsia and prevention of progression from mild to severe disease. It appears to result in a modest reduction in risk of preeclampsia when given to women at moderate to high risk of preeclampsia.  This approach has been studied in over 35,000 women, for both prevention of preeclampsia and prevention of progression of the disease. Low dose aspirin has a modest impact on pregnancy outcome; it  reduces the risk of preeclampsia, as well as other adverse pregnancy outcomes (eg,  delivery, fetal growth restriction) by about 10 to 20 percent. Low dose aspirin is safe in pregnancy; thus, it is a reasonable strategy in women with a moderate to high risk of preeclampsia in whom the benefits outweigh the risks.     Clinical Guidelines  Based on the available data, low-dose aspirin is advised for women at high risk for preeclampsia. There is no consensus on the criteria that confer high risk. The [place] Preventive Services Task Force (USPSTF) risk criteria are reasonable.  USPSTF criteria for high risk include one or more of the following:   Previous pregnancy with preeclampsia, especially early onset and with an adverse outcome   Multifetal gestation  Chronic hypertension   Type 1 or 2 diabetes mellitus  Chronic kidney disease  Autoimmune disease (antiphospholipid syndrome, systemic lupus erythematosus)     Women with multiple moderate risk factors for preeclampsia are considered high risk, as well. Identification of women with an appropriate combination of moderate risk factors to be considered high risk is subjective and determined case by case, as the data describing the magnitude of the association between each of these risk factors and development of preeclampsia vary widely and lack consistency. USPSTF criteria for moderate risk include:  Nulliparity  Obesity (body mass index >30 kg/m2)  Family history of preeclampsia in mother or sister  Age >=35 years  Sociodemographic characteristics (, low socioeconomic level)  Personal risk factors (eg, history of low birth weight or small for gestational age, previous adverse pregnancy outcome, >10 year pregnancy interval)     If used, daily low-dose aspirin should be initiated in the 12th or 13th week of gestation, although adverse effects from earlier initiation (eg, preconception) have not been documented. The optimum low dose is  unclear; 81 mg is readily available, but 100 or 150 mg (which are not available in some countries including the [place] may be more effective.  In some pregnant women, platelet function is not inhibited by the 81 mg dose but is altered by higher dosing. Hence, current evidence has suggested a daily dose of 100 to 150 mg of aspirin rather than a lower dose. In the [place], this can be achieved by taking one and one-half 81 mg tablets; however, taking one 81 mg tablet is also reasonable since this is the commercially available dose and has proven efficacy. For prevention of preeclampsia, no trials have evaluated the efficacy of a higher dose of aspirin, which could be achieved easily by taking two 81 mg tablets or splitting a 325 mg tablet. For prevention of myocardial infarction and stroke in nonpregnant individuals, aspirin appears to be equally effective at doses between 75 and 325 mg per day.  The safety of low-dose aspirin use in the second and third trimesters is well established, but questions linger regarding use in the first trimester (possible increase in minor vaginal bleeding or gastroschisis). Early therapy (before 16 weeks) may be important since the pathophysiologic features of preeclampsia develop at this time, weeks before clinical disease is apparent. However, available evidence is inconsistent about the importance of early initiation of therapy, possibly because aspirin has major effects on prostacyclin production and endothelial function throughout gestation.  Aspirin is discontinued 5 to 10 days before expected delivery to diminish the risk of bleeding during delivery; however, no adverse maternal or fetal effects related to low-dose aspirin have been proven.  Major questions remain as to which, if any, subgroups of women are more likely to benefit from low-dose aspirin therapy, when treatment should be started (first or second trimester), and the optimum dose to inhibit placental PGH  synthase (cyclooxygenase) and also allow the anti-inflammatory effects of low-dose aspirin.        IMPRESSION:  IUP at 12w2d   AMA desires nIPT screen, declines aneuploidy screening  Class III Obesity  Uterine fibroids  History preeclampsia  Prior  x 1      RECOMMENDATIONS:  Continue care with Dr. Deng  Monthly growth ultrasounds starting at 28 weeks, add BPP to each growth ultrasound at 32 weeks and beyond  Weekly NSTs at 34 weeks  Limit weight gain to 11-20 pounds.  Delivery at 39-40 weeks  Low-dose aspirin 162 mg daily        Thank you for allowing me to participate in the care of your patient.  Please do not hesitate to contact me if additional questions or concerns arise.      Jacki Blackburn M.D.    40 minutes spent in review of records, patient consultation, documentation and coordination of care.  The relevant clinical matter(s) are summarized above.     Note to patient and family  The 21st Century Cures Act makes medical notes available to patients in the interest of transparency.  However, please be advised that this is a medical document.  It is intended as [id number] communication.  It is written and medical language may contain abbreviations or verbiage that are technical and unfamiliar.  It may appear blunt or direct.  Medical documents are intended to carry relevant information, facts as evident, and the clinical opinion of the practitioner.         [1]   Allergies  Allergen Reactions    Compazine [Prochlorperazine] OTHER (SEE COMMENTS)     Muscle spasms in mouth

## 2025-01-08 ENCOUNTER — TELEPHONE (OUTPATIENT)
Dept: PERINATAL CARE | Facility: HOSPITAL | Age: 40
End: 2025-01-08

## 2025-01-08 NOTE — TELEPHONE ENCOUNTER
[place] screening results  Reviewed by MFM Dr. faith    Low Risk for Aneuploidies, triploidy and Monosomy X  Fetal Sex: male      My chart message sent  Copy of results sent for scanning into pt record

## 2025-01-09 ENCOUNTER — ROUTINE PRENATAL (OUTPATIENT)
Dept: OBGYN CLINIC | Facility: CLINIC | Age: 40
End: 2025-01-09
Payer: MEDICAID

## 2025-01-09 VITALS
HEART RATE: 85 BPM | SYSTOLIC BLOOD PRESSURE: 135 MMHG | DIASTOLIC BLOOD PRESSURE: 84 MMHG | WEIGHT: 225 LBS | BODY MASS INDEX: 41 KG/M2

## 2025-01-09 DIAGNOSIS — Z34.91 ENCOUNTER FOR SUPERVISION OF NORMAL PREGNANCY IN FIRST TRIMESTER, UNSPECIFIED GRAVIDITY (HCC): Primary | ICD-10-CM

## 2025-01-09 LAB
BILIRUBIN: NEGATIVE
GLUCOSE (URINE DIPSTICK): NEGATIVE MG/DL
KETONES (URINE DIPSTICK): NEGATIVE MG/DL
LEUKOCYTES: NEGATIVE
MULTISTIX LOT#: 57 NUMERIC
NITRITE, URINE: NEGATIVE
OCCULT BLOOD: NEGATIVE
PH, URINE: 6 (ref 4.5–8)
PROTEIN (URINE DIPSTICK): NEGATIVE MG/DL
SPECIFIC GRAVITY: 1.02 (ref 1–1.03)
UROBILINOGEN,SEMI-QN: 0.2 MG/DL (ref 0–1.9)

## 2025-01-09 PROCEDURE — 81002 URINALYSIS NONAUTO W/O SCOPE: CPT | Performed by: OBSTETRICS & GYNECOLOGY

## 2025-01-09 PROCEDURE — 99213 OFFICE O/P EST LOW 20 MIN: CPT | Performed by: OBSTETRICS & GYNECOLOGY

## 2025-01-09 NOTE — PROGRESS NOTES
Here with partner.  RTC 4 week.    See Prenatal Vitals flowsheet for documentation of vitals / urine / exam. See problem list -- updated in detail.   Used 20-29 min in order to do detailed chart review & review of blood work / ultrasound reports / MFM reports, reviewing history, performing prenatal exam / pelvic exam, counseling pt on vaccines, prenatal education, ordering tests or meds, ordering MFM /specialist referrals, documentation in EMR, interpretation / communication of results & care coordination

## 2025-02-04 ENCOUNTER — ROUTINE PRENATAL (OUTPATIENT)
Dept: OBGYN CLINIC | Facility: CLINIC | Age: 40
End: 2025-02-04
Payer: MEDICAID

## 2025-02-04 VITALS
WEIGHT: 227.63 LBS | DIASTOLIC BLOOD PRESSURE: 84 MMHG | HEART RATE: 85 BPM | SYSTOLIC BLOOD PRESSURE: 133 MMHG | BODY MASS INDEX: 42 KG/M2

## 2025-02-04 DIAGNOSIS — Z34.92 ENCOUNTER FOR SUPERVISION OF NORMAL PREGNANCY IN SECOND TRIMESTER, UNSPECIFIED GRAVIDITY (HCC): Primary | ICD-10-CM

## 2025-02-04 LAB
APPEARANCE: CLEAR
BILIRUBIN: NEGATIVE
GLUCOSE (URINE DIPSTICK): NEGATIVE MG/DL
KETONES (URINE DIPSTICK): NEGATIVE MG/DL
LEUKOCYTES: NEGATIVE
MULTISTIX LOT#: NORMAL NUMERIC
NITRITE, URINE: NEGATIVE
OCCULT BLOOD: NEGATIVE
PH, URINE: 6.5 (ref 4.5–8)
SPECIFIC GRAVITY: 1.01 (ref 1–1.03)
URINE-COLOR: YELLOW
UROBILINOGEN,SEMI-QN: 0.2 MG/DL (ref 0–1.9)

## 2025-02-04 PROCEDURE — 99213 OFFICE O/P EST LOW 20 MIN: CPT | Performed by: OBSTETRICS & GYNECOLOGY

## 2025-02-04 PROCEDURE — 81002 URINALYSIS NONAUTO W/O SCOPE: CPT | Performed by: OBSTETRICS & GYNECOLOGY

## 2025-02-06 NOTE — PROGRESS NOTES
Taking bASA. Level 2 ultrasound scheduled for 2/25. Undecided on BS at time of rCSx.  See Prenatal Vitals flowsheet for documentation of vitals / urine / exam. See problem list -- updated in detail.   Used 20-29 min in order to do detailed chart review & review of blood work / ultrasound reports / MFM reports, reviewing history, performing prenatal exam / pelvic exam, counseling pt on vaccines, prenatal education, ordering tests or meds, ordering MFM /specialist referrals, documentation in EMR, interpretation / communication of results & care coordination

## 2025-02-21 NOTE — PROGRESS NOTES
Outpatient Maternal-Fetal Medicine Follow-Up    Dear Dr. Deng    Thank you for requesting an ultrasound and maternal-fetal medicine consultation on your patient Karen Espinosa.  As you are aware she is a 39 year old female  with a kim pregnancy and an Estimated Date of Delivery: 7/15/25.  She returned to maternal-fetal medicine today for a follow-up visit.  Her history was reviewed from her prior visit and there were no interval changes.    Antepartum Risk Factors  Class III Obesity  Uterine fibroids  History preeclampsia  Prior  x 1   AMA: low-risk cell free fetal DNA, declined invasive testing    PHYSICAL EXAMINATION:  /76   Pulse 76   Wt 234 lb (106.1 kg)   LMP 10/08/2024 (Exact Date)   BMI 42.80 kg/m²   General: alert and oriented, no acute distress  Abdomen: gravid, soft, non-tender  Extremities: non-tender, no edema    OBSTETRIC ULTRASOUND  The patient had a level II ultrasound today which revealed size consistent with dates and a normal detailed anatomic survey.      Ultrasound Findings:  Single IUP in transverse presentation.    Placenta is anterior.   A 3 vessel cord is noted.  Cardiac activity is present at 135 bpm   g ( 0 lb 13 oz);   MVP is 5.5 cm .     Fibroid(s)   1.  Size 61 mm x 45 mm x 51 mm. Mean 52.3 mm. Vol 73.301 cm³. Right lateral wall  2.  Size 20 mm x 16 mm x 23 mm. Mean 19.7 mm. Vol 3.854 cm³. Anterior    The fetal measurements are consistent with the established EDC. No ultrasound evidence of structural abnormalities are seen today. The nasal bone is present. No ultrasound evidence of markers for aneuploidy are seen. She understands that ultrasound exam cannot exclude genetic abnormalities and that genetic testing is recommended. The limitations of ultrasound were discussed.     Uterus and adnexa appeared normal  today on US    I interpreted the results and reviewed them with the patient.    DISCUSSION  During her visit we discussed and reviewed the  following issues:  OBESITY  See prior MFM notes for a detailed review.    ADVANCED MATERNAL AGE  See prior MFM notes for a detailed review.  She did not desire invasive genetic testing.   She has already obtained a low-risk NPIT result and was appropriately reassured.     IMPRESSION:  IUP at 20w0d  Class III Obesity  Uterine fibroids  History preeclampsia  Prior  x 1   AMA: low-risk cell free fetal DNA, declined invasive testing     RECOMMENDATIONS:  Continue care with Dr. Deng  Monthly growth ultrasounds starting at 28 weeks, add BPP to each growth ultrasound at 32 weeks and beyond  Weekly NSTs at 34 weeks  Limit weight gain to 11-20 pounds.  Delivery at 39-40 weeks  Low-dose aspirin 162 mg daily    Thank you for allowing me to participate in the care of your patient.  Please do not hesitate to contact me if additional questions or concerns arise.      Jurgen Pratt M.D.    40 minutes spent in review of records, patient consultation, documentation and coordination of care.  The relevant clinical matter(s) are summarized above.     Note to patient and family  The 21st Century Cures Act makes medical notes available to patients in the interest of transparency.  However, please be advised that this is a medical document.  It is intended as nkjp-iz-pujq communication.  It is written and medical language may contain abbreviations or verbiage that are technical and unfamiliar.  It may appear blunt or direct.  Medical documents are intended to carry relevant information, facts as evident, and the clinical opinion of the practitioner.

## 2025-02-25 ENCOUNTER — HOSPITAL ENCOUNTER (OUTPATIENT)
Dept: PERINATAL CARE | Facility: HOSPITAL | Age: 40
Discharge: HOME | End: 2025-02-25
Attending: OBSTETRICS & GYNECOLOGY
Payer: MEDICAID

## 2025-02-25 VITALS
SYSTOLIC BLOOD PRESSURE: 138 MMHG | BODY MASS INDEX: 43 KG/M2 | WEIGHT: 234 LBS | DIASTOLIC BLOOD PRESSURE: 76 MMHG | HEART RATE: 76 BPM

## 2025-02-25 DIAGNOSIS — O09.521 MULTIGRAVIDA OF ADVANCED MATERNAL AGE IN FIRST TRIMESTER (HCC): ICD-10-CM

## 2025-02-25 DIAGNOSIS — R03.0 ELEVATED BP WITHOUT DIAGNOSIS OF HYPERTENSION: ICD-10-CM

## 2025-02-25 DIAGNOSIS — Z87.59 HISTORY OF PRE-ECLAMPSIA: ICD-10-CM

## 2025-02-25 DIAGNOSIS — Z36.89 ENCOUNTER FOR FETAL ANATOMIC SURVEY (HCC): ICD-10-CM

## 2025-02-25 DIAGNOSIS — Z82.79 FAMILY HISTORY OF DOWNS SYNDROME: ICD-10-CM

## 2025-02-25 DIAGNOSIS — O09.522 MULTIGRAVIDA OF ADVANCED MATERNAL AGE IN SECOND TRIMESTER (HCC): Primary | ICD-10-CM

## 2025-02-25 DIAGNOSIS — E66.01 MATERNAL MORBID OBESITY IN SECOND TRIMESTER, ANTEPARTUM (HCC): ICD-10-CM

## 2025-02-25 DIAGNOSIS — O99.212 MATERNAL MORBID OBESITY IN SECOND TRIMESTER, ANTEPARTUM (HCC): ICD-10-CM

## 2025-02-25 PROCEDURE — 76811 OB US DETAILED SNGL FETUS: CPT | Performed by: OBSTETRICS & GYNECOLOGY

## 2025-03-27 ENCOUNTER — ROUTINE PRENATAL (OUTPATIENT)
Dept: OBGYN CLINIC | Facility: CLINIC | Age: 40
End: 2025-03-27
Payer: MEDICAID

## 2025-03-27 VITALS
BODY MASS INDEX: 43 KG/M2 | DIASTOLIC BLOOD PRESSURE: 85 MMHG | HEART RATE: 102 BPM | WEIGHT: 233 LBS | SYSTOLIC BLOOD PRESSURE: 135 MMHG

## 2025-03-27 DIAGNOSIS — Z63.4 DEATH OF FAMILY MEMBER: ICD-10-CM

## 2025-03-27 DIAGNOSIS — Z34.92 ENCOUNTER FOR SUPERVISION OF NORMAL PREGNANCY IN SECOND TRIMESTER, UNSPECIFIED GRAVIDITY (HCC): Primary | ICD-10-CM

## 2025-03-27 LAB
BILIRUBIN: NEGATIVE
GLUCOSE (URINE DIPSTICK): NEGATIVE MG/DL
KETONES (URINE DIPSTICK): 40 MG/DL
LEUKOCYTES: NEGATIVE
MULTISTIX LOT#: 57 NUMERIC
NITRITE, URINE: NEGATIVE
OCCULT BLOOD: NEGATIVE
PH, URINE: 6 (ref 4.5–8)
PROTEIN (URINE DIPSTICK): NEGATIVE MG/DL
SPECIFIC GRAVITY: 1.01 (ref 1–1.03)
UROBILINOGEN,SEMI-QN: 0.2 MG/DL (ref 0–1.9)

## 2025-03-27 PROCEDURE — 99213 OFFICE O/P EST LOW 20 MIN: CPT | Performed by: OBSTETRICS & GYNECOLOGY

## 2025-03-27 PROCEDURE — 81002 URINALYSIS NONAUTO W/O SCOPE: CPT | Performed by: OBSTETRICS & GYNECOLOGY

## 2025-03-27 SDOH — SOCIAL STABILITY - SOCIAL INSECURITY: DISSAPEARANCE AND DEATH OF FAMILY MEMBER: Z63.4
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
## 2025-04-02 ENCOUNTER — TELEPHONE (OUTPATIENT)
Age: 40
End: 2025-04-02

## 2025-04-02 NOTE — TELEPHONE ENCOUNTER
The [facility] Navigation team has attempted to reach you in order to follow up on an order that was placed by Dr. Lagos's office. Please give us a call back at [phone] to discuss care coordination and resources.

## 2025-04-04 ENCOUNTER — ROUTINE PRENATAL (OUTPATIENT)
Dept: OBGYN CLINIC | Facility: CLINIC | Age: 40
End: 2025-04-04
Payer: MEDICAID

## 2025-04-04 DIAGNOSIS — Z34.92 ENCOUNTER FOR SUPERVISION OF NORMAL PREGNANCY IN SECOND TRIMESTER, UNSPECIFIED GRAVIDITY (HCC): Primary | ICD-10-CM

## 2025-04-04 LAB
APPEARANCE: CLEAR
BILIRUBIN: NEGATIVE
GLUCOSE (URINE DIPSTICK): NEGATIVE MG/DL
KETONES (URINE DIPSTICK): NEGATIVE MG/DL
LEUKOCYTES: NEGATIVE
MULTISTIX LOT#: NORMAL NUMERIC
NITRITE, URINE: NEGATIVE
OCCULT BLOOD: NEGATIVE
PH, URINE: 7.5 (ref 4.5–8)
SPECIFIC GRAVITY: 1.01 (ref 1–1.03)
URINE-COLOR: YELLOW
UROBILINOGEN,SEMI-QN: 0.2 MG/DL (ref 0–1.9)

## 2025-04-04 PROCEDURE — 81002 URINALYSIS NONAUTO W/O SCOPE: CPT | Performed by: OBSTETRICS & GYNECOLOGY

## 2025-04-04 PROCEDURE — 99213 OFFICE O/P EST LOW 20 MIN: CPT | Performed by: OBSTETRICS & GYNECOLOGY

## 2025-04-07 ENCOUNTER — TELEPHONE (OUTPATIENT)
Age: 40
End: 2025-04-07

## 2025-04-07 NOTE — TELEPHONE ENCOUNTER
Intake Department, [facility]  [address]  [facility] [address]  Phone: [phone]    Kanchan Field, Therapist   [address]  [facility] [address]  Phone: [phone]    Intake Department, [facility]  [address]  [facility] [address]  Phone: [phone]    Intake Department, [facility]  [address]  [facility] [address]  Phone: [phone]

## 2025-04-08 ENCOUNTER — LAB ENCOUNTER (OUTPATIENT)
Dept: LAB | Facility: HOSPITAL | Age: 40
End: 2025-04-08
Attending: OBSTETRICS & GYNECOLOGY
Payer: MEDICAID

## 2025-04-08 VITALS
WEIGHT: 235 LBS | HEART RATE: 76 BPM | BODY MASS INDEX: 43 KG/M2 | DIASTOLIC BLOOD PRESSURE: 84 MMHG | SYSTOLIC BLOOD PRESSURE: 127 MMHG

## 2025-04-08 LAB
ANTIBODY SCREEN: NEGATIVE
DEPRECATED RDW RBC AUTO: 41.2 FL (ref 35.1–46.3)
ERYTHROCYTE [DISTWIDTH] IN BLOOD BY AUTOMATED COUNT: 12.6 % (ref 11–15)
GLUCOSE 1H P GLC SERPL-MCNC: 120 MG/DL
HCT VFR BLD AUTO: 34.6 %
HGB BLD-MCNC: 11.3 G/DL
MCH RBC QN AUTO: 29.7 PG (ref 26–34)
MCHC RBC AUTO-ENTMCNC: 32.7 G/DL (ref 31–37)
MCV RBC AUTO: 90.8 FL
PLATELET # BLD AUTO: 172 10(3)UL (ref 150–450)
RBC # BLD AUTO: 3.81 X10(6)UL
RH BLOOD TYPE: NEGATIVE
WBC # BLD AUTO: 9.1 X10(3) UL (ref 4–11)

## 2025-04-08 PROCEDURE — 86850 RBC ANTIBODY SCREEN: CPT | Performed by: OBSTETRICS & GYNECOLOGY

## 2025-04-08 PROCEDURE — 86900 BLOOD TYPING SEROLOGIC ABO: CPT | Performed by: OBSTETRICS & GYNECOLOGY

## 2025-04-08 PROCEDURE — 85027 COMPLETE CBC AUTOMATED: CPT | Performed by: OBSTETRICS & GYNECOLOGY

## 2025-04-08 PROCEDURE — 82950 GLUCOSE TEST: CPT | Performed by: OBSTETRICS & GYNECOLOGY

## 2025-04-08 PROCEDURE — 36415 COLL VENOUS BLD VENIPUNCTURE: CPT | Performed by: OBSTETRICS & GYNECOLOGY

## 2025-04-08 PROCEDURE — 86901 BLOOD TYPING SEROLOGIC RH(D): CPT | Performed by: OBSTETRICS & GYNECOLOGY

## 2025-04-16 NOTE — PROGRESS NOTES
Outpatient Maternal-Fetal Medicine Follow-Up     Dear Dr. Deng     Thank you for requesting an ultrasound and maternal-fetal medicine consultation on your patient Karen Espinosa.  As you are aware she is a 39 year old female  with a kim pregnancy and an Estimated Date of Delivery: 7/15/25.  She returned to maternal-fetal medicine today for a follow-up visit.  Her history was reviewed from her prior visit and there were no interval changes.     Antepartum Risk Factors  Class III Obesity  Uterine fibroids  History preeclampsia  Prior  x 1   AMA: low-risk cell free fetal DNA, declined invasive testing     PHYSICAL EXAMINATION:  /79   Pulse 73   Wt 270 lb (122.5 kg)   LMP 10/08/2024 (Exact Date)   BMI 49.38 kg/m²   General: alert and oriented, no acute distress  Abdomen: gravid, soft, non-tender  Extremities: non-tender, no edema     OBSTETRIC ULTRASOUND    Ultrasound Findings:  Single IUP in cephalic presentation.    Placenta is anterior.   A 3 vessel cord is noted.  Cardiac activity is present at 133 bpm  EFW 1311 g ( 2 lb 14 oz); 66%.    MVP is 6.2 cm . JULIO 21.1 cm    The fetal measurements are consistent with established EDC. No gross ultrasound evidence of structural abnormalities are seen today. The patient understands that ultrasound cannot rule out all structural and chromosomal abnormalities.     I interpreted the results and reviewed them with the patient.     DISCUSSION  During her visit we discussed and reviewed the following issues:  OBESITY  See prior MFM notes for a detailed review.     ADVANCED MATERNAL AGE  See prior [facility] notes for a detailed review.  She did not desire invasive genetic testing.   She has already obtained a low-risk NPIT result and was appropriately reassured.      IMPRESSION:  IUP at 28w0d  Class III Obesity  Uterine fibroids  History preeclampsia  Prior  x 1   AMA: low-risk cell free fetal DNA, declined invasive testing      RECOMMENDATIONS:  Continue care with Dr. Deng  Monthly growth ultrasounds starting at 28 weeks, add BPP to each growth ultrasound at 32 weeks and beyond  Weekly NSTs at 34 weeks  Limit weight gain to 11-20 pounds.  Delivery at 39-40 weeks  Low-dose aspirin 162 mg daily     Thank you for allowing me to participate in the care of your patient.  Please do not hesitate to contact me if additional questions or concerns arise.       Jurgen Pratt M.D.     20 minutes spent in review of records, patient consultation, documentation and coordination of care.  The relevant clinical matter(s) are summarized above.      Note to patient and family  The 21st Century Cures Act makes medical notes available to patients in the interest of transparency.  However, please be advised that this is a medical document.  It is intended as iffs-wo-gmxm communication.  It is written and medical language may contain abbreviations or verbiage that are technical and unfamiliar.  It may appear blunt or direct.  Medical documents are intended to carry relevant information, facts as evident, and the clinical opinion of the practitioner.

## 2025-04-22 ENCOUNTER — HOSPITAL ENCOUNTER (OUTPATIENT)
Dept: PERINATAL CARE | Facility: HOSPITAL | Age: 40
Discharge: HOME | End: 2025-04-22
Attending: OBSTETRICS & GYNECOLOGY
Payer: MEDICAID

## 2025-04-22 VITALS
DIASTOLIC BLOOD PRESSURE: 79 MMHG | BODY MASS INDEX: 49 KG/M2 | SYSTOLIC BLOOD PRESSURE: 122 MMHG | WEIGHT: 270 LBS | HEART RATE: 73 BPM

## 2025-04-22 DIAGNOSIS — O99.212 MATERNAL MORBID OBESITY IN SECOND TRIMESTER, ANTEPARTUM (HCC): ICD-10-CM

## 2025-04-22 DIAGNOSIS — Z87.59 HISTORY OF PRE-ECLAMPSIA: ICD-10-CM

## 2025-04-22 DIAGNOSIS — Z82.79 FAMILY HISTORY OF DOWNS SYNDROME: ICD-10-CM

## 2025-04-22 DIAGNOSIS — R03.0 ELEVATED BP WITHOUT DIAGNOSIS OF HYPERTENSION: ICD-10-CM

## 2025-04-22 DIAGNOSIS — O99.213 MATERNAL MORBID OBESITY IN THIRD TRIMESTER, ANTEPARTUM (HCC): ICD-10-CM

## 2025-04-22 DIAGNOSIS — O99.212 MATERNAL MORBID OBESITY IN SECOND TRIMESTER, ANTEPARTUM (HCC): Primary | ICD-10-CM

## 2025-04-22 DIAGNOSIS — E66.01 MATERNAL MORBID OBESITY IN SECOND TRIMESTER, ANTEPARTUM (HCC): Primary | ICD-10-CM

## 2025-04-22 DIAGNOSIS — E66.01 MATERNAL MORBID OBESITY IN SECOND TRIMESTER, ANTEPARTUM (HCC): ICD-10-CM

## 2025-04-22 DIAGNOSIS — E66.01 MATERNAL MORBID OBESITY IN THIRD TRIMESTER, ANTEPARTUM (HCC): ICD-10-CM

## 2025-04-22 PROCEDURE — 76816 OB US FOLLOW-UP PER FETUS: CPT | Performed by: OBSTETRICS & GYNECOLOGY

## 2025-04-24 ENCOUNTER — ROUTINE PRENATAL (OUTPATIENT)
Dept: OBGYN CLINIC | Facility: CLINIC | Age: 40
End: 2025-04-24
Payer: MEDICAID

## 2025-04-24 VITALS
WEIGHT: 238 LBS | HEART RATE: 89 BPM | SYSTOLIC BLOOD PRESSURE: 115 MMHG | DIASTOLIC BLOOD PRESSURE: 79 MMHG | BODY MASS INDEX: 44 KG/M2

## 2025-04-24 DIAGNOSIS — Z34.92 ENCOUNTER FOR SUPERVISION OF NORMAL PREGNANCY IN SECOND TRIMESTER, UNSPECIFIED GRAVIDITY (HCC): Primary | ICD-10-CM

## 2025-04-24 LAB
APPEARANCE: CLEAR
BILIRUBIN: NEGATIVE
GLUCOSE (URINE DIPSTICK): NEGATIVE MG/DL
KETONES (URINE DIPSTICK): NEGATIVE MG/DL
LEUKOCYTES: NEGATIVE
MULTISTIX LOT#: NORMAL NUMERIC
NITRITE, URINE: NEGATIVE
OCCULT BLOOD: NEGATIVE
PH, URINE: 6 (ref 4.5–8)
SPECIFIC GRAVITY: 1.01 (ref 1–1.03)
URINE-COLOR: YELLOW
UROBILINOGEN,SEMI-QN: 1 MG/DL (ref 0–1.9)

## 2025-04-24 PROCEDURE — 81002 URINALYSIS NONAUTO W/O SCOPE: CPT | Performed by: OBSTETRICS & GYNECOLOGY

## 2025-04-24 PROCEDURE — 99212 OFFICE O/P EST SF 10 MIN: CPT | Performed by: OBSTETRICS & GYNECOLOGY

## 2025-04-24 PROCEDURE — 96372 THER/PROPH/DIAG INJ SC/IM: CPT | Performed by: OBSTETRICS & GYNECOLOGY

## 2025-04-24 NOTE — PROGRESS NOTES
Prenatal patient in today for Routine Rhogam injection. Patient is 28w2d today. Patient without complaints. Injection given right upper outer gluteus. Patient tolerated well. Rhogam information sheet provided. Patient instructed to contact office with any questions or concerns. Patient verbalized understanding.

## 2025-05-06 ENCOUNTER — TELEPHONE (OUTPATIENT)
Dept: OBGYN CLINIC | Facility: CLINIC | Age: 40
End: 2025-05-06

## 2025-05-06 ENCOUNTER — ROUTINE PRENATAL (OUTPATIENT)
Dept: OBGYN CLINIC | Facility: CLINIC | Age: 40
End: 2025-05-06

## 2025-05-06 VITALS
HEART RATE: 90 BPM | BODY MASS INDEX: 45 KG/M2 | WEIGHT: 244.81 LBS | DIASTOLIC BLOOD PRESSURE: 83 MMHG | SYSTOLIC BLOOD PRESSURE: 127 MMHG

## 2025-05-06 DIAGNOSIS — Z34.93 ENCOUNTER FOR SUPERVISION OF NORMAL PREGNANCY IN THIRD TRIMESTER, UNSPECIFIED GRAVIDITY (HCC): Primary | ICD-10-CM

## 2025-05-06 PROBLEM — R03.0 ELEVATED BP WITHOUT DIAGNOSIS OF HYPERTENSION: Status: RESOLVED | Noted: 2021-06-30 | Resolved: 2025-05-06

## 2025-05-06 LAB
APPEARANCE: CLEAR
BILIRUBIN: NEGATIVE
GLUCOSE (URINE DIPSTICK): NEGATIVE MG/DL
KETONES (URINE DIPSTICK): NEGATIVE MG/DL
LEUKOCYTES: NEGATIVE
MULTISTIX LOT#: NORMAL NUMERIC
NITRITE, URINE: NEGATIVE
OCCULT BLOOD: NEGATIVE
PH, URINE: 7 (ref 4.5–8)
SPECIFIC GRAVITY: 1.01 (ref 1–1.03)
URINE-COLOR: YELLOW
UROBILINOGEN,SEMI-QN: 0.2 MG/DL (ref 0–1.9)

## 2025-05-06 PROCEDURE — 99212 OFFICE O/P EST SF 10 MIN: CPT | Performed by: OBSTETRICS & GYNECOLOGY

## 2025-05-06 PROCEDURE — 81002 URINALYSIS NONAUTO W/O SCOPE: CPT | Performed by: OBSTETRICS & GYNECOLOGY

## 2025-05-06 NOTE — TELEPHONE ENCOUNTER
Spoke to patient. Aware section is scheduled on Wed,7/9/25 at 1130am with Dr. Dixon.    Will reach out to assist Mid-June    Labor and delivery instructions sent, antimicrobial wash instructions sent , and enhanced recovery instructions sent. [employer]    Delayed staff message sent to MD to please place pre-op orders    Delayed staff message sent to RN pool to please place pre-op order    Entered in book and calendar

## 2025-05-06 NOTE — TELEPHONE ENCOUNTER
OB GYN SURGICAL SCHEDULING    Assessment: previous     Pre-Operative Procedure:  Repeat     Side:     In / on:     Date:   or , her other child's B-day is the  so avoid it    Admission:  AM Admit    Anesthesia: Spinal    Additional Orders:  Routine Orders    Comments / Orders to Nurse: She declines salpingectomy

## 2025-05-06 NOTE — PROGRESS NOTES
See Prenatal Vitals flowsheet for documentation of vitals / urine / exam. See problem list -- updated in detail.   Used 10-19 min in order to do detailed chart review & review of blood work / ultrasound reports / MFM reports, reviewing history, performing prenatal exam / pelvic exam, counseling pt on vaccines, prenatal education, ordering tests or meds, ordering MFM /specialist referrals, documentation in EMR, interpretation / communication of results & care coordination  Danish at visit. No S/S of PTL. Schedule RLTCS. They would avoid 39w0d as that is first child's birthday.

## 2025-05-13 NOTE — PROGRESS NOTES
Outpatient Maternal-Fetal Medicine Follow-Up     Dear Dr. Deng     Thank you for requesting an ultrasound and maternal-fetal medicine consultation on your patient Karen Espinosa.  As you are aware she is a 39 year old female  with a kim pregnancy and an Estimated Date of Delivery: 7/15/25.  She returned to maternal-fetal medicine today for a follow-up visit.  Her history was reviewed from her prior visit and there were no interval changes.     Antepartum Risk Factors  Class III Obesity  Uterine fibroids  History preeclampsia  Prior  x 1   AMA: low-risk cell free fetal DNA, declined invasive testing     PHYSICAL EXAMINATION:  /77   Pulse 76   Wt 249 lb (112.9 kg)   LMP 10/08/2024 (Exact Date)   BMI 45.54 kg/m²   General: alert and oriented, no acute distress  Abdomen: gravid, soft, non-tender  Extremities: non-tender, no edema     OBSTETRIC ULTRASOUND     Ultrasound Findings:  Single IUP in breech presentation.    Placenta is anterior.   A 3 vessel cord is noted.  Cardiac activity is present at 136 bpm  EFW 2237 g ( 4 lb 15 oz); 71%.    JULIO is  13.1 cm.  MVP is 5.3 cm  BPP is 8/8.     The fetal measurements are consistent with established EDC. No gross ultrasound evidence of structural abnormalities are seen today. The patient understands that ultrasound cannot rule out all structural and chromosomal abnormalities.     I interpreted the results and reviewed them with the patient.     DISCUSSION  During her visit we discussed and reviewed the following issues:  OBESITY  See prior MFM notes for a detailed review.     ADVANCED MATERNAL AGE  See prior [facility] notes for a detailed review.  She did not desire invasive genetic testing.   She has already obtained a low-risk NPIT result and was appropriately reassured.      IMPRESSION:  IUP at 32w0d  Class III Obesity  Uterine fibroids  History preeclampsia  Prior  x 1   AMA: low-risk cell free fetal DNA, declined invasive testing      RECOMMENDATIONS:  Continue care with Dr. Deng  Monthly growth ultrasounds starting at 28 weeks, add BPP to each growth ultrasound at 32 weeks and beyond  Weekly NSTs at 34 weeks  Limit weight gain to 11-20 pounds.  Delivery at 39-40 weeks  Low-dose aspirin 162 mg daily     Thank you for allowing me to participate in the care of your patient.  Please do not hesitate to contact me if additional questions or concerns arise.       Jurgen Pratt M.D.     20 minutes spent in review of records, patient consultation, documentation and coordination of care.  The relevant clinical matter(s) are summarized above.      Note to patient and family  The 21st Century Cures Act makes medical notes available to patients in the interest of transparency.  However, please be advised that this is a medical document.  It is intended as cyrx-ms-gzfk communication.  It is written and medical language may contain abbreviations or verbiage that are technical and unfamiliar.  It may appear blunt or direct.  Medical documents are intended to carry relevant information, facts as evident, and the clinical opinion of the practitioner.

## 2025-05-15 ENCOUNTER — TELEPHONE (OUTPATIENT)
Dept: OBGYN CLINIC | Facility: CLINIC | Age: 40
End: 2025-05-15

## 2025-05-15 NOTE — TELEPHONE ENCOUNTER
Received sacroiliac support request from [employer] . Stamped faxed back and sent to scanning

## 2025-05-15 NOTE — TELEPHONE ENCOUNTER
Received a written order request from [employer] for a breast pump. Stamped/dated and faxed back. Fax confirmation received sent to scanning

## 2025-05-20 ENCOUNTER — LAB ENCOUNTER (OUTPATIENT)
Dept: LAB | Facility: HOSPITAL | Age: 40
End: 2025-05-20
Attending: OBSTETRICS & GYNECOLOGY
Payer: MEDICAID

## 2025-05-20 ENCOUNTER — HOSPITAL ENCOUNTER (OUTPATIENT)
Dept: PERINATAL CARE | Facility: HOSPITAL | Age: 40
Discharge: HOME | End: 2025-05-20
Attending: OBSTETRICS & GYNECOLOGY
Payer: MEDICAID

## 2025-05-20 ENCOUNTER — ROUTINE PRENATAL (OUTPATIENT)
Dept: OBGYN CLINIC | Facility: CLINIC | Age: 40
End: 2025-05-20
Payer: MEDICAID

## 2025-05-20 VITALS
HEART RATE: 84 BPM | BODY MASS INDEX: 45 KG/M2 | WEIGHT: 248.63 LBS | DIASTOLIC BLOOD PRESSURE: 85 MMHG | SYSTOLIC BLOOD PRESSURE: 133 MMHG

## 2025-05-20 VITALS
WEIGHT: 249 LBS | SYSTOLIC BLOOD PRESSURE: 124 MMHG | BODY MASS INDEX: 46 KG/M2 | HEART RATE: 76 BPM | DIASTOLIC BLOOD PRESSURE: 77 MMHG

## 2025-05-20 DIAGNOSIS — O09.521 MULTIGRAVIDA OF ADVANCED MATERNAL AGE IN FIRST TRIMESTER (HCC): ICD-10-CM

## 2025-05-20 DIAGNOSIS — Z34.93 ENCOUNTER FOR SUPERVISION OF NORMAL PREGNANCY IN THIRD TRIMESTER, UNSPECIFIED GRAVIDITY (HCC): Primary | ICD-10-CM

## 2025-05-20 DIAGNOSIS — O99.212 MATERNAL MORBID OBESITY IN SECOND TRIMESTER, ANTEPARTUM (HCC): ICD-10-CM

## 2025-05-20 DIAGNOSIS — Z87.59 HISTORY OF PRE-ECLAMPSIA: ICD-10-CM

## 2025-05-20 DIAGNOSIS — E66.01 MATERNAL MORBID OBESITY IN SECOND TRIMESTER, ANTEPARTUM (HCC): ICD-10-CM

## 2025-05-20 DIAGNOSIS — Z98.891 HISTORY OF CESAREAN DELIVERY: ICD-10-CM

## 2025-05-20 DIAGNOSIS — O99.212 MATERNAL MORBID OBESITY IN SECOND TRIMESTER, ANTEPARTUM (HCC): Primary | ICD-10-CM

## 2025-05-20 DIAGNOSIS — O09.523 AMA (ADVANCED MATERNAL AGE) MULTIGRAVIDA 35+, THIRD TRIMESTER (HCC): ICD-10-CM

## 2025-05-20 DIAGNOSIS — E66.01 MATERNAL MORBID OBESITY IN SECOND TRIMESTER, ANTEPARTUM (HCC): Primary | ICD-10-CM

## 2025-05-20 DIAGNOSIS — Z34.93 ENCOUNTER FOR SUPERVISION OF NORMAL PREGNANCY IN THIRD TRIMESTER, UNSPECIFIED GRAVIDITY (HCC): ICD-10-CM

## 2025-05-20 LAB
APPEARANCE: CLEAR
BILIRUBIN: NEGATIVE
DEPRECATED RDW RBC AUTO: 39.9 FL (ref 35.1–46.3)
ERYTHROCYTE [DISTWIDTH] IN BLOOD BY AUTOMATED COUNT: 12.6 % (ref 11–15)
GLUCOSE (URINE DIPSTICK): NEGATIVE MG/DL
HCT VFR BLD AUTO: 34 % (ref 35–48)
HGB BLD-MCNC: 11.5 G/DL (ref 12–16)
KETONES (URINE DIPSTICK): NEGATIVE MG/DL
LEUKOCYTES: NEGATIVE
MCH RBC QN AUTO: 29.5 PG (ref 26–34)
MCHC RBC AUTO-ENTMCNC: 33.8 G/DL (ref 31–37)
MCV RBC AUTO: 87.2 FL (ref 80–100)
MULTISTIX LOT#: NORMAL NUMERIC
NITRITE, URINE: NEGATIVE
OCCULT BLOOD: NEGATIVE
PH, URINE: 7.5 (ref 4.5–8)
PLATELET # BLD AUTO: 213 10(3)UL (ref 150–450)
PROTEIN (URINE DIPSTICK): NEGATIVE MG/DL
RBC # BLD AUTO: 3.9 X10(6)UL (ref 3.8–5.3)
SPECIFIC GRAVITY: 1.01 (ref 1–1.03)
T PALLIDUM AB SER QL IA: NONREACTIVE
URINE-COLOR: YELLOW
UROBILINOGEN,SEMI-QN: 0.2 MG/DL (ref 0–1.9)
WBC # BLD AUTO: 10.2 X10(3) UL (ref 4–11)

## 2025-05-20 PROCEDURE — 85027 COMPLETE CBC AUTOMATED: CPT

## 2025-05-20 PROCEDURE — 90471 IMMUNIZATION ADMIN: CPT | Performed by: OBSTETRICS & GYNECOLOGY

## 2025-05-20 PROCEDURE — 86780 TREPONEMA PALLIDUM: CPT

## 2025-05-20 PROCEDURE — 76816 OB US FOLLOW-UP PER FETUS: CPT | Performed by: OBSTETRICS & GYNECOLOGY

## 2025-05-20 PROCEDURE — 36415 COLL VENOUS BLD VENIPUNCTURE: CPT

## 2025-05-20 PROCEDURE — 99213 OFFICE O/P EST LOW 20 MIN: CPT | Performed by: OBSTETRICS & GYNECOLOGY

## 2025-05-20 PROCEDURE — 90715 TDAP VACCINE 7 YRS/> IM: CPT | Performed by: OBSTETRICS & GYNECOLOGY

## 2025-05-20 PROCEDURE — 81002 URINALYSIS NONAUTO W/O SCOPE: CPT | Performed by: OBSTETRICS & GYNECOLOGY

## 2025-05-20 PROCEDURE — 76819 FETAL BIOPHYS PROFIL W/O NST: CPT

## 2025-05-20 PROCEDURE — 87389 HIV-1 AG W/HIV-1&-2 AB AG IA: CPT

## 2025-05-20 NOTE — PROGRESS NOTES
Wishes for TDAP. Problem list reviewed in detail & updated. NSTs once 34 wks. rCSx scheduled for 7/9/25.    See Prenatal Vitals flowsheet for documentation of vitals / urine / exam. See problem list -- updated in detail.   Used 20-29 min in order to do detailed chart review & review of blood work / ultrasound reports / MFM reports, reviewing history, performing prenatal exam / pelvic exam, counseling pt on vaccines, prenatal education, ordering tests or meds, ordering MFM /specialist referrals, documentation in EMR, interpretation / communication of results & care coordination

## 2025-05-20 NOTE — PROGRESS NOTES
The following individual(s) verbally consented to be recorded using ambient AI listening technology and understand that they can each withdraw their consent to this listening technology at any point by asking the clinician to turn off or pause the recording:    Patient name: Karen Espinosa  Additional names:  JESSIE

## 2025-06-03 ENCOUNTER — HOSPITAL ENCOUNTER (OUTPATIENT)
Facility: HOSPITAL | Age: 40
Discharge: HOME | End: 2025-06-03
Attending: OBSTETRICS & GYNECOLOGY | Admitting: OBSTETRICS & GYNECOLOGY
Payer: MEDICAID

## 2025-06-03 ENCOUNTER — APPOINTMENT (OUTPATIENT)
Dept: LAB | Facility: HOSPITAL | Age: 40
End: 2025-06-03
Attending: OBSTETRICS & GYNECOLOGY
Payer: MEDICAID

## 2025-06-03 ENCOUNTER — ROUTINE PRENATAL (OUTPATIENT)
Dept: OBGYN CLINIC | Facility: CLINIC | Age: 40
End: 2025-06-03
Payer: MEDICAID

## 2025-06-03 VITALS
WEIGHT: 250 LBS | BODY MASS INDEX: 46 KG/M2 | HEART RATE: 87 BPM | DIASTOLIC BLOOD PRESSURE: 85 MMHG | SYSTOLIC BLOOD PRESSURE: 129 MMHG

## 2025-06-03 DIAGNOSIS — Z34.90 PREGNANCY (HCC): ICD-10-CM

## 2025-06-03 DIAGNOSIS — Z34.83 ENCOUNTER FOR SUPERVISION OF OTHER NORMAL PREGNANCY IN THIRD TRIMESTER (HCC): Primary | ICD-10-CM

## 2025-06-03 LAB
APPEARANCE: CLEAR
GLUCOSE (URINE DIPSTICK): NEGATIVE MG/DL
MULTISTIX LOT#: NORMAL NUMERIC
NITRITE, URINE: NEGATIVE
URINE-COLOR: YELLOW

## 2025-06-03 PROCEDURE — 99213 OFFICE O/P EST LOW 20 MIN: CPT | Performed by: OBSTETRICS & GYNECOLOGY

## 2025-06-03 PROCEDURE — 59025 FETAL NON-STRESS TEST: CPT | Performed by: OBSTETRICS & GYNECOLOGY

## 2025-06-03 PROCEDURE — 81002 URINALYSIS NONAUTO W/O SCOPE: CPT | Performed by: OBSTETRICS & GYNECOLOGY

## 2025-06-03 PROCEDURE — 59025 FETAL NON-STRESS TEST: CPT

## 2025-06-03 NOTE — PROGRESS NOTES
The following individual(s) verbally consented to be recorded using ambient AI listening technology and understand that they can each withdraw their consent to this listening technology at any point by asking the clinician to turn off or pause the recording:    Patient name: Karen Espinosa  Additional names: JESSIE (  )

## 2025-06-04 NOTE — PROGRESS NOTES
S/p TDAP & rhogam. Doing NSTs -- started today. Check CBC. rcSx scheduled for 7/9 -- declines BTL.    See Prenatal Vitals flowsheet for documentation of vitals / urine / exam. See problem list -- updated in detail.   Used 20-29 min in order to do detailed chart review & review of blood work / ultrasound reports / MFM reports, reviewing history, performing prenatal exam / pelvic exam, counseling pt on vaccines, prenatal education, ordering tests or meds, ordering MFM /specialist referrals, documentation in EMR, interpretation / communication of results & care coordination

## 2025-06-10 ENCOUNTER — LAB ENCOUNTER (OUTPATIENT)
Dept: LAB | Facility: HOSPITAL | Age: 40
End: 2025-06-10
Attending: OBSTETRICS & GYNECOLOGY
Payer: MEDICAID

## 2025-06-10 ENCOUNTER — HOSPITAL ENCOUNTER (OUTPATIENT)
Facility: HOSPITAL | Age: 40
Discharge: HOME | End: 2025-06-10
Attending: OBSTETRICS & GYNECOLOGY | Admitting: OBSTETRICS & GYNECOLOGY
Payer: MEDICAID

## 2025-06-10 VITALS
HEART RATE: 85 BPM | SYSTOLIC BLOOD PRESSURE: 102 MMHG | TEMPERATURE: 98 F | DIASTOLIC BLOOD PRESSURE: 61 MMHG | RESPIRATION RATE: 16 BRPM

## 2025-06-10 DIAGNOSIS — Z34.83 ENCOUNTER FOR SUPERVISION OF OTHER NORMAL PREGNANCY IN THIRD TRIMESTER (HCC): ICD-10-CM

## 2025-06-10 DIAGNOSIS — Z34.92 ENCOUNTER FOR SUPERVISION OF NORMAL PREGNANCY IN SECOND TRIMESTER, UNSPECIFIED GRAVIDITY (HCC): ICD-10-CM

## 2025-06-10 DIAGNOSIS — Z34.90 PREGNANCY (HCC): ICD-10-CM

## 2025-06-10 LAB
DEPRECATED RDW RBC AUTO: 41.8 FL (ref 35.1–46.3)
ERYTHROCYTE [DISTWIDTH] IN BLOOD BY AUTOMATED COUNT: 13 % (ref 11–15)
HCT VFR BLD AUTO: 32.9 % (ref 35–48)
HGB BLD-MCNC: 11.2 G/DL (ref 12–16)
MCH RBC QN AUTO: 30.1 PG (ref 26–34)
MCHC RBC AUTO-ENTMCNC: 34 G/DL (ref 31–37)
MCV RBC AUTO: 88.4 FL (ref 80–100)
PLATELET # BLD AUTO: 194 10(3)UL (ref 150–450)
RBC # BLD AUTO: 3.72 X10(6)UL (ref 3.8–5.3)
RH BLOOD TYPE: NEGATIVE
WBC # BLD AUTO: 10.1 X10(3) UL (ref 4–11)

## 2025-06-10 PROCEDURE — 59025 FETAL NON-STRESS TEST: CPT

## 2025-06-10 PROCEDURE — 36415 COLL VENOUS BLD VENIPUNCTURE: CPT

## 2025-06-10 PROCEDURE — 86901 BLOOD TYPING SEROLOGIC RH(D): CPT

## 2025-06-10 PROCEDURE — 85027 COMPLETE CBC AUTOMATED: CPT

## 2025-06-10 PROCEDURE — 86900 BLOOD TYPING SEROLOGIC ABO: CPT

## 2025-06-10 NOTE — PROGRESS NOTES
Pt is a 39 year old female admitted to TR1/TR1-A.     Chief Complaint   Patient presents with    Non-stress Test     AMA and INC BMI      Pt is  35w0d intra-uterine pregnancy.  History obtained, consents signed. Oriented to room, staff, and plan of care.

## 2025-06-17 ENCOUNTER — HOSPITAL ENCOUNTER (OUTPATIENT)
Dept: PERINATAL CARE | Facility: HOSPITAL | Age: 40
Discharge: HOME | End: 2025-06-17
Attending: OBSTETRICS & GYNECOLOGY
Payer: MEDICAID

## 2025-06-17 ENCOUNTER — ROUTINE PRENATAL (OUTPATIENT)
Dept: OBGYN CLINIC | Facility: CLINIC | Age: 40
End: 2025-06-17
Payer: MEDICAID

## 2025-06-17 VITALS
DIASTOLIC BLOOD PRESSURE: 84 MMHG | BODY MASS INDEX: 46 KG/M2 | SYSTOLIC BLOOD PRESSURE: 136 MMHG | WEIGHT: 254 LBS | HEART RATE: 83 BPM

## 2025-06-17 VITALS
SYSTOLIC BLOOD PRESSURE: 118 MMHG | WEIGHT: 254 LBS | DIASTOLIC BLOOD PRESSURE: 83 MMHG | HEART RATE: 97 BPM | BODY MASS INDEX: 46 KG/M2

## 2025-06-17 DIAGNOSIS — O99.213 MATERNAL MORBID OBESITY IN THIRD TRIMESTER, ANTEPARTUM (HCC): ICD-10-CM

## 2025-06-17 DIAGNOSIS — O09.521 MULTIGRAVIDA OF ADVANCED MATERNAL AGE IN FIRST TRIMESTER (HCC): ICD-10-CM

## 2025-06-17 DIAGNOSIS — O09.523 AMA (ADVANCED MATERNAL AGE) MULTIGRAVIDA 35+, THIRD TRIMESTER (HCC): ICD-10-CM

## 2025-06-17 DIAGNOSIS — E66.01 MATERNAL MORBID OBESITY IN SECOND TRIMESTER, ANTEPARTUM (HCC): ICD-10-CM

## 2025-06-17 DIAGNOSIS — O99.212 MATERNAL MORBID OBESITY IN SECOND TRIMESTER, ANTEPARTUM (HCC): ICD-10-CM

## 2025-06-17 DIAGNOSIS — Z87.59 HISTORY OF PRE-ECLAMPSIA: ICD-10-CM

## 2025-06-17 DIAGNOSIS — Z34.93 ENCOUNTER FOR SUPERVISION OF NORMAL PREGNANCY IN THIRD TRIMESTER, UNSPECIFIED GRAVIDITY (HCC): Primary | ICD-10-CM

## 2025-06-17 DIAGNOSIS — E66.01 MATERNAL MORBID OBESITY IN THIRD TRIMESTER, ANTEPARTUM (HCC): ICD-10-CM

## 2025-06-17 DIAGNOSIS — E66.01 MATERNAL MORBID OBESITY IN SECOND TRIMESTER, ANTEPARTUM (HCC): Primary | ICD-10-CM

## 2025-06-17 DIAGNOSIS — O99.212 MATERNAL MORBID OBESITY IN SECOND TRIMESTER, ANTEPARTUM (HCC): Primary | ICD-10-CM

## 2025-06-17 LAB
BILIRUBIN: NEGATIVE
GLUCOSE (URINE DIPSTICK): NEGATIVE MG/DL
KETONES (URINE DIPSTICK): NEGATIVE MG/DL
LEUKOCYTES: NEGATIVE
MULTISTIX LOT#: 57 NUMERIC
NITRITE, URINE: NEGATIVE
OCCULT BLOOD: NEGATIVE
PH, URINE: 7.5 (ref 4.5–8)
SPECIFIC GRAVITY: 1.01 (ref 1–1.03)
UROBILINOGEN,SEMI-QN: 0.2 MG/DL (ref 0–1.9)

## 2025-06-17 PROCEDURE — 99213 OFFICE O/P EST LOW 20 MIN: CPT | Performed by: OBSTETRICS & GYNECOLOGY

## 2025-06-17 PROCEDURE — 81002 URINALYSIS NONAUTO W/O SCOPE: CPT | Performed by: OBSTETRICS & GYNECOLOGY

## 2025-06-17 PROCEDURE — 76816 OB US FOLLOW-UP PER FETUS: CPT | Performed by: OBSTETRICS & GYNECOLOGY

## 2025-06-17 PROCEDURE — 76819 FETAL BIOPHYS PROFIL W/O NST: CPT

## 2025-06-17 NOTE — PROGRESS NOTES
Outpatient Maternal-Fetal Medicine Follow-Up     Dear Dr. Deng     Thank you for requesting an ultrasound and maternal-fetal medicine consultation on your patient Karen Espinosa.  As you are aware she is a 39 year old female  with a kim pregnancy and an Estimated Date of Delivery: 7/15/25.  She returned to maternal-fetal medicine today for a follow-up visit.  Her history was reviewed from her prior visit and there were no interval changes.     Antepartum Risk Factors  Class III Obesity  Uterine fibroids  History preeclampsia  Prior  x 1   AMA: low-risk cell free fetal DNA, declined invasive testing     PHYSICAL EXAMINATION:  /83   Pulse 97   Wt 254 lb (115.2 kg)   LMP 10/08/2024 (Exact Date)   BMI 46.46 kg/m²   General: alert and oriented, no acute distress  Abdomen: gravid, soft, non-tender  Extremities: non-tender, no edema        DISCUSSION  During her visit we discussed and reviewed the following issues:  OBESITY  See prior MFM notes for a detailed review.     ADVANCED MATERNAL AGE  See prior MFM notes for a detailed review.  She did not desire invasive genetic testing.   She has already obtained a low-risk NPIT result and was appropriately reassured.          OB ULTRASOUND REPORT   See imaging tab for complete ultrasound report or in PACS    Single IUP in breech presentation.    Placenta is anterior.   A 3 vessel cord is noted. Normal cord insertion.  Cardiac activity is present at 137 bpm  EFW 3147 g ( 6 lb 15 oz); 72%.    JULIO is  23.1 cm.  MVP is 9 cm      BIOPHYSICAL PROFILE:  Movement:    2/2  Tone:            2/2  Breathin/2  Fluid:             2/2  TOTAL:             IMPRESSION:  IUP at 36w0d   Class III Obesity  Uterine fibroids  History preeclampsia  Prior  x 1   AMA: low-risk cell free fetal DNA, declined invasive testing     RECOMMENDATIONS:  Continue care with Dr. Deng  Weekly NSTs   Limit weight gain to 11-20 pounds.  Delivery at 39-40 weeks        Thank you for allowing me to participate in the care of your patient.  Please do not hesitate to contact me if additional questions or concerns arise.       Papa Craig D.O.  Maternal Fetal Medicine      20 minutes spent in review of records, patient consultation, documentation and coordination of care.  The relevant clinical matter(s) are summarized above.      Note to patient and family  The 21st Century Cures Act makes medical notes available to patients in the interest of transparency.  However, please be advised that this is a medical document.  It is intended as ymlh-lw-rnkt communication.  It is written and medical language may contain abbreviations or verbiage that are technical and unfamiliar.  It may appear blunt or direct.  Medical documents are intended to carry relevant information, facts as evident, and the clinical opinion of the practitioner.

## 2025-06-17 NOTE — PROGRESS NOTES
Doing well. GBS collected. Labor instructions provided. RTC 1 week    See Prenatal Vitals flowsheet for documentation of vitals / urine / exam. See problem list -- updated in detail.   Used 20-29 min in order to do detailed chart review & review of blood work / ultrasound reports / MFM reports, reviewing history, performing prenatal exam / pelvic exam, counseling pt on vaccines, prenatal education, ordering tests or meds, ordering MFM /specialist referrals, documentation in EMR, interpretation / communication of results & care coordination

## 2025-06-19 LAB — GROUP B STREP BY PCR FOR PCR OVT: POSITIVE

## 2025-06-20 PROBLEM — B95.1 POSITIVE GBS TEST: Status: ACTIVE | Noted: 2025-06-20

## 2025-06-24 ENCOUNTER — NST DOCUMENTATION (OUTPATIENT)
Dept: OBGYN CLINIC | Facility: CLINIC | Age: 40
End: 2025-06-24

## 2025-06-25 ENCOUNTER — HOSPITAL ENCOUNTER (OUTPATIENT)
Facility: HOSPITAL | Age: 40
Discharge: HOME | End: 2025-06-25
Attending: OBSTETRICS & GYNECOLOGY | Admitting: OBSTETRICS & GYNECOLOGY
Payer: MEDICAID

## 2025-06-25 ENCOUNTER — ROUTINE PRENATAL (OUTPATIENT)
Dept: OBGYN CLINIC | Facility: CLINIC | Age: 40
End: 2025-06-25
Payer: MEDICAID

## 2025-06-25 VITALS
WEIGHT: 250 LBS | SYSTOLIC BLOOD PRESSURE: 138 MMHG | BODY MASS INDEX: 46 KG/M2 | DIASTOLIC BLOOD PRESSURE: 92 MMHG | HEART RATE: 86 BPM

## 2025-06-25 VITALS — HEART RATE: 68 BPM | DIASTOLIC BLOOD PRESSURE: 78 MMHG | SYSTOLIC BLOOD PRESSURE: 128 MMHG

## 2025-06-25 DIAGNOSIS — Z34.93 ENCOUNTER FOR SUPERVISION OF NORMAL PREGNANCY IN THIRD TRIMESTER, UNSPECIFIED GRAVIDITY (HCC): Primary | ICD-10-CM

## 2025-06-25 LAB
ALBUMIN SERPL-MCNC: 3.9 G/DL (ref 3.2–4.8)
ALBUMIN/GLOB SERPL: 1.7 {RATIO} (ref 1–2)
ALP LIVER SERPL-CCNC: 96 U/L (ref 37–98)
ALT SERPL-CCNC: 8 U/L (ref 10–49)
ANION GAP SERPL CALC-SCNC: 9 MMOL/L (ref 0–18)
AST SERPL-CCNC: 12 U/L (ref ?–34)
BASOPHILS # BLD AUTO: 0.04 X10(3) UL (ref 0–0.2)
BASOPHILS NFR BLD AUTO: 0.4 %
BILIRUB SERPL-MCNC: 0.4 MG/DL (ref 0.3–1.2)
BILIRUBIN: NEGATIVE
BUN BLD-MCNC: 7 MG/DL (ref 9–23)
BUN/CREAT SERPL: 14.9 (ref 10–20)
CALCIUM BLD-MCNC: 8.8 MG/DL (ref 8.7–10.4)
CHLORIDE SERPL-SCNC: 103 MMOL/L (ref 98–112)
CO2 SERPL-SCNC: 24 MMOL/L (ref 21–32)
CREAT BLD-MCNC: 0.47 MG/DL (ref 0.55–1.02)
CREAT UR-SCNC: 119.4 MG/DL
DEPRECATED RDW RBC AUTO: 41.9 FL (ref 35.1–46.3)
EGFRCR SERPLBLD CKD-EPI 2021: 126 ML/MIN/1.73M2 (ref 60–?)
EOSINOPHIL # BLD AUTO: 0.07 X10(3) UL (ref 0–0.7)
EOSINOPHIL NFR BLD AUTO: 0.7 %
ERYTHROCYTE [DISTWIDTH] IN BLOOD BY AUTOMATED COUNT: 13.1 % (ref 11–15)
FASTING STATUS PATIENT QL REPORTED: NO
GLOBULIN PLAS-MCNC: 2.3 G/DL (ref 2–3.5)
GLUCOSE (URINE DIPSTICK): NEGATIVE MG/DL
GLUCOSE BLD-MCNC: 73 MG/DL (ref 70–99)
HCT VFR BLD AUTO: 34.1 % (ref 35–48)
HGB BLD-MCNC: 11.5 G/DL (ref 12–16)
IMM GRANULOCYTES # BLD AUTO: 0.06 X10(3) UL (ref 0–1)
IMM GRANULOCYTES NFR BLD: 0.6 %
KETONES (URINE DIPSTICK): NEGATIVE MG/DL
LEUKOCYTES: NEGATIVE
LYMPHOCYTES # BLD AUTO: 1.39 X10(3) UL (ref 1–4)
LYMPHOCYTES NFR BLD AUTO: 14.8 %
MCH RBC QN AUTO: 29.9 PG (ref 26–34)
MCHC RBC AUTO-ENTMCNC: 33.7 G/DL (ref 31–37)
MCV RBC AUTO: 88.6 FL (ref 80–100)
MONOCYTES # BLD AUTO: 0.68 X10(3) UL (ref 0.1–1)
MONOCYTES NFR BLD AUTO: 7.3 %
MULTISTIX LOT#: 57 NUMERIC
NEUTROPHILS # BLD AUTO: 7.13 X10 (3) UL (ref 1.5–7.7)
NEUTROPHILS # BLD AUTO: 7.13 X10(3) UL (ref 1.5–7.7)
NEUTROPHILS NFR BLD AUTO: 76.2 %
NITRITE, URINE: NEGATIVE
OCCULT BLOOD: NEGATIVE
OSMOLALITY SERPL CALC.SUM OF ELEC: 279 MOSM/KG (ref 275–295)
PH, URINE: 7.5 (ref 4.5–8)
PLATELET # BLD AUTO: 176 10(3)UL (ref 150–450)
POTASSIUM SERPL-SCNC: 4 MMOL/L (ref 3.5–5.1)
PROT SERPL-MCNC: 6.2 G/DL (ref 5.7–8.2)
PROT UR-MCNC: 23.9 MG/DL (ref ?–14)
PROT/CREAT UR-RTO: 0.2
PROTEIN (URINE DIPSTICK): 30 MG/DL
RBC # BLD AUTO: 3.85 X10(6)UL (ref 3.8–5.3)
SODIUM SERPL-SCNC: 136 MMOL/L (ref 136–145)
SPECIFIC GRAVITY: 1.01 (ref 1–1.03)
UROBILINOGEN,SEMI-QN: 0.2 MG/DL (ref 0–1.9)
WBC # BLD AUTO: 9.4 X10(3) UL (ref 4–11)

## 2025-06-25 PROCEDURE — 36415 COLL VENOUS BLD VENIPUNCTURE: CPT

## 2025-06-25 PROCEDURE — 80053 COMPREHEN METABOLIC PANEL: CPT | Performed by: OBSTETRICS & GYNECOLOGY

## 2025-06-25 PROCEDURE — 82570 ASSAY OF URINE CREATININE: CPT | Performed by: OBSTETRICS & GYNECOLOGY

## 2025-06-25 PROCEDURE — 59025 FETAL NON-STRESS TEST: CPT

## 2025-06-25 PROCEDURE — 84156 ASSAY OF PROTEIN URINE: CPT | Performed by: OBSTETRICS & GYNECOLOGY

## 2025-06-25 PROCEDURE — 85025 COMPLETE CBC W/AUTO DIFF WBC: CPT | Performed by: OBSTETRICS & GYNECOLOGY

## 2025-06-25 PROCEDURE — 81002 URINALYSIS NONAUTO W/O SCOPE: CPT | Performed by: OBSTETRICS & GYNECOLOGY

## 2025-06-25 PROCEDURE — 99213 OFFICE O/P EST LOW 20 MIN: CPT

## 2025-06-25 PROCEDURE — 99213 OFFICE O/P EST LOW 20 MIN: CPT | Performed by: OBSTETRICS & GYNECOLOGY

## 2025-06-25 NOTE — PROGRESS NOTES
Pt is a 39 year old female admitted to [facility]11/BBSRR91-6.     Chief Complaint   Patient presents with    R/o Preeclampsia     NST and r/o preeclampsia; elevated BP in office      Pt is  37w1d intra-uterine pregnancy.  History obtained, consents signed. Oriented to room, staff, and plan of care.

## 2025-06-25 NOTE — PROGRESS NOTES
GBS positive.  Reviewed 3T labs.   Has NST today.  Will order PIH labs.  RTC 1 week  See Prenatal Vitals flowsheet for documentation of vitals / urine / exam. See problem list -- updated in detail.   Used 20-29 min in order to do detailed chart review & review of blood work / ultrasound reports / MFM reports, reviewing history, performing prenatal exam / pelvic exam, counseling pt on vaccines, prenatal education, ordering tests or meds, ordering MFM /specialist referrals, documentation in EMR, interpretation / communication of results & care coordination

## 2025-06-25 NOTE — TRIAGE
[facility]  part of [facility]      Triage Note    Karen Espinosa Patient Status:  Outpatient    1985 MRN [id number]   Location [facility] Attending Andrew Nolasco,    Hosp Day # 0 PCP Luis Arevalo MD      Para:   Estimated Date of Delivery: 7/15/25  Gestation: 37w1d    Chief Complaint    R/o Preeclampsia         Allergies:  Allergies[1]    Orders Placed This Encounter   Procedures    Comp Metabolic Panel (14)    CBC With Differential With Platelet    Protein/Creatinine Ratio, Urine Random       Lab Results   Component Value Date    WBC 9.4 2025    HGB 11.5 (L) 2025    HCT 34.1 (L) 2025    .0 2025    CREATSERUM 0.47 (L) 2025    BUN 7 (L) 2025     2025    K 4.0 2025     2025    CO2 24.0 2025    GLU 73 2025    CA 8.8 2025    ALB 3.9 2025    ALKPHO 96 2025    BILT 0.4 2025    TP 6.2 2025    AST 12 2025    ALT 8 (L) 2025    TSH 2.149 05/15/2024    B12 532 05/15/2024       Clinitek UA  Lab Results   Component Value Date    GLUUR Negative 2021    SPECGRAVITY 1.015 2025    URINECUL No Growth at 18-24 hrs. 2024       UA  Lab Results   Component Value Date    COLORUR Yellow 2021    CLARITY Hazy (A) 2021    SPECGRAVITY 1.015 2025    PROUR 100 (A) 2021    GLUUR Negative 2021    KETUR 80 (A) 2021    BILUR Negative 2021    BLOODURINE Negative 2021    NITRITE Negative 2025    UROBILINOGEN <2.0 2021    LEUUR Trace (A) 2021    UASA Negative 2017       Vitals:    25 1045 25 1100 25 1115 25 1130   BP: 134/86 134/79 135/70 128/78   Pulse: 76 71 84 68       NST  Variability: Moderate           Accelerations: Yes           Decelerations: None            Baseline: 120 BPM           Uterine Irritability: No            Contractions: Regular                    Contraction Frequency: 2-5; pt unaware. Palpate mild                   Acoustic Stimulator: No           Nonstress Test Interpretation: Reactive           Nonstress Test Second Interpretation: Reactive          FHR Category: Category I             Additional Comments       Chief Complaint   Patient presents with    R/o Preeclampsia     NST and r/o preeclampsia; elevated BP in office   Pt reports good fetal movement. Denies any increased swelling, denies headache or visual disturbances. Serial BP's performed and all wnl. CBC. CMP and PC ratio performed and all wnl. NST reactive. Findings reported to Dr Nolasco per telephone. Pt dc home with labor precautions and kick counts. Preeclampsia instructions reinforced. Pt to follow up routine next week for prenatal visit. Pt with verbalized understanding.       Siri ADAMES RN  6/25/2025 11:42 AM         [1]   Allergies  Allergen Reactions    Compazine [Prochlorperazine] OTHER (SEE COMMENTS)     Muscle spasms in mouth

## 2025-06-25 NOTE — NST
Nonstress Test   Patient: Karen Espinosa    6/3/25 NST    Diagnosis from order: Obesity in pregnancy ([facility])    Problem List:   Patient Active Problem List   Diagnosis    BMI 40.6    Frequent UTI    Rh negative state in antepartum period ([facility])    Multigravida of advanced maternal age in first trimester ([facility])    History of  delivery    History of pre-eclampsia    Family history of Downs syndrome    Maternal morbid obesity in second trimester, antepartum ([facility]) [O99.212, E66.01]    Death of family member    Positive GBS test       NST: 145/mod/R    Sean Lagos DO  2025  10:48 PM

## 2025-07-02 ENCOUNTER — ANESTHESIA (OUTPATIENT)
Dept: OBGYN UNIT | Facility: HOSPITAL | Age: 40
End: 2025-07-02
Payer: MEDICAID

## 2025-07-02 ENCOUNTER — ANESTHESIA EVENT (OUTPATIENT)
Dept: OBGYN UNIT | Facility: HOSPITAL | Age: 40
End: 2025-07-02
Payer: MEDICAID

## 2025-07-02 ENCOUNTER — ROUTINE PRENATAL (OUTPATIENT)
Dept: OBGYN CLINIC | Facility: CLINIC | Age: 40
End: 2025-07-02

## 2025-07-02 ENCOUNTER — HOSPITAL ENCOUNTER (INPATIENT)
Facility: HOSPITAL | Age: 40
LOS: 4 days | Discharge: HOME | End: 2025-07-06
Attending: OBSTETRICS & GYNECOLOGY | Admitting: OBSTETRICS & GYNECOLOGY
Payer: MEDICAID

## 2025-07-02 VITALS
HEART RATE: 86 BPM | SYSTOLIC BLOOD PRESSURE: 146 MMHG | WEIGHT: 253.38 LBS | BODY MASS INDEX: 46 KG/M2 | DIASTOLIC BLOOD PRESSURE: 86 MMHG

## 2025-07-02 DIAGNOSIS — Z34.90 PREGNANCY (HCC): ICD-10-CM

## 2025-07-02 DIAGNOSIS — Z34.93 ENCOUNTER FOR SUPERVISION OF NORMAL PREGNANCY IN THIRD TRIMESTER, UNSPECIFIED GRAVIDITY (HCC): Primary | ICD-10-CM

## 2025-07-02 LAB
ALBUMIN SERPL-MCNC: 3.9 G/DL (ref 3.2–4.8)
ALBUMIN/GLOB SERPL: 1.9 {RATIO} (ref 1–2)
ALP LIVER SERPL-CCNC: 101 U/L (ref 37–98)
ALT SERPL-CCNC: 8 U/L (ref 10–49)
ANION GAP SERPL CALC-SCNC: 6 MMOL/L (ref 0–18)
ANTIBODY SCREEN: POSITIVE
AST SERPL-CCNC: 12 U/L (ref ?–34)
BASOPHILS # BLD AUTO: 0.03 X10(3) UL (ref 0–0.2)
BASOPHILS NFR BLD AUTO: 0.3 %
BILIRUB SERPL-MCNC: 0.4 MG/DL (ref 0.3–1.2)
BUN BLD-MCNC: 6 MG/DL (ref 9–23)
BUN/CREAT SERPL: 11.5 (ref 10–20)
CALCIUM BLD-MCNC: 9 MG/DL (ref 8.7–10.4)
CHLORIDE SERPL-SCNC: 104 MMOL/L (ref 98–112)
CO2 SERPL-SCNC: 24 MMOL/L (ref 21–32)
CREAT BLD-MCNC: 0.52 MG/DL (ref 0.55–1.02)
CREAT UR-SCNC: 91.8 MG/DL
DEPRECATED RDW RBC AUTO: 42.4 FL (ref 35.1–46.3)
DIRECT COOMBS POLY: NEGATIVE
EGFRCR SERPLBLD CKD-EPI 2021: 123 ML/MIN/1.73M2 (ref 60–?)
EOSINOPHIL # BLD AUTO: 0.04 X10(3) UL (ref 0–0.7)
EOSINOPHIL NFR BLD AUTO: 0.4 %
ERYTHROCYTE [DISTWIDTH] IN BLOOD BY AUTOMATED COUNT: 13.1 % (ref 11–15)
FASTING STATUS PATIENT QL REPORTED: NO
GLOBULIN PLAS-MCNC: 2.1 G/DL (ref 2–3.5)
GLUCOSE (URINE DIPSTICK): NEGATIVE MG/DL
GLUCOSE BLD-MCNC: 96 MG/DL (ref 70–99)
HCT VFR BLD AUTO: 32.8 % (ref 35–48)
HGB BLD-MCNC: 11.1 G/DL (ref 12–16)
IMM GRANULOCYTES # BLD AUTO: 0.03 X10(3) UL (ref 0–1)
IMM GRANULOCYTES NFR BLD: 0.3 %
KETONES (URINE DIPSTICK): NEGATIVE MG/DL
LEUKOCYTES: NEGATIVE
LYMPHOCYTES # BLD AUTO: 1.36 X10(3) UL (ref 1–4)
LYMPHOCYTES NFR BLD AUTO: 14.8 %
MCH RBC QN AUTO: 30 PG (ref 26–34)
MCHC RBC AUTO-ENTMCNC: 33.8 G/DL (ref 31–37)
MCV RBC AUTO: 88.6 FL (ref 80–100)
MONOCYTES # BLD AUTO: 0.6 X10(3) UL (ref 0.1–1)
MONOCYTES NFR BLD AUTO: 6.5 %
MULTISTIX LOT#: ABNORMAL NUMERIC
NEUTROPHILS # BLD AUTO: 7.16 X10 (3) UL (ref 1.5–7.7)
NEUTROPHILS # BLD AUTO: 7.16 X10(3) UL (ref 1.5–7.7)
NEUTROPHILS NFR BLD AUTO: 77.7 %
NITRITE, URINE: NEGATIVE
OCCULT BLOOD: NEGATIVE
OSMOLALITY SERPL CALC.SUM OF ELEC: 275 MOSM/KG (ref 275–295)
PH, URINE: 7 (ref 4.5–8)
PLATELET # BLD AUTO: 185 10(3)UL (ref 150–450)
POTASSIUM SERPL-SCNC: 4 MMOL/L (ref 3.5–5.1)
PROT SERPL-MCNC: 6 G/DL (ref 5.7–8.2)
PROT UR-MCNC: 20.9 MG/DL (ref ?–14)
PROT/CREAT UR-RTO: 0.23
PROTEIN (URINE DIPSTICK): NEGATIVE MG/DL
RBC # BLD AUTO: 3.7 X10(6)UL (ref 3.8–5.3)
RH BLOOD TYPE: NEGATIVE
SODIUM SERPL-SCNC: 134 MMOL/L (ref 136–145)
SPECIFIC GRAVITY: 1.01 (ref 1–1.03)
T PALLIDUM AB SER QL IA: NONREACTIVE
UROBILINOGEN,SEMI-QN: 0.2 MG/DL (ref 0–1.9)
WBC # BLD AUTO: 9.2 X10(3) UL (ref 4–11)

## 2025-07-02 PROCEDURE — 99213 OFFICE O/P EST LOW 20 MIN: CPT | Performed by: OBSTETRICS & GYNECOLOGY

## 2025-07-02 PROCEDURE — 81002 URINALYSIS NONAUTO W/O SCOPE: CPT | Performed by: OBSTETRICS & GYNECOLOGY

## 2025-07-02 RX ORDER — ASPIRIN 81 MG/1
TABLET, CHEWABLE ORAL DAILY
COMMUNITY
End: 2025-07-06

## 2025-07-02 RX ORDER — ONDANSETRON 2 MG/ML
4 INJECTION INTRAMUSCULAR; INTRAVENOUS ONCE AS NEEDED
Status: ACTIVE | OUTPATIENT
Start: 2025-07-02 | End: 2025-07-02

## 2025-07-02 RX ORDER — MORPHINE SULFATE 1 MG/ML
INJECTION, SOLUTION EPIDURAL; INTRATHECAL; INTRAVENOUS
Status: COMPLETED | OUTPATIENT
Start: 2025-07-02 | End: 2025-07-02

## 2025-07-02 RX ORDER — ONDANSETRON 2 MG/ML
INJECTION INTRAMUSCULAR; INTRAVENOUS AS NEEDED
Status: DISCONTINUED | OUTPATIENT
Start: 2025-07-02 | End: 2025-07-03 | Stop reason: SURG

## 2025-07-02 RX ORDER — ACETAMINOPHEN 325 MG/1
650 TABLET ORAL EVERY 6 HOURS PRN
Status: ACTIVE | OUTPATIENT
Start: 2025-07-02 | End: 2025-07-03

## 2025-07-02 RX ORDER — BUPIVACAINE HYDROCHLORIDE 7.5 MG/ML
INJECTION, SOLUTION INTRASPINAL
Status: COMPLETED | OUTPATIENT
Start: 2025-07-02 | End: 2025-07-02

## 2025-07-02 RX ORDER — HYDROCODONE BITARTRATE AND ACETAMINOPHEN 7.5; 325 MG/1; MG/1
2 TABLET ORAL EVERY 6 HOURS PRN
Refills: 0 | Status: ACTIVE | OUTPATIENT
Start: 2025-07-02 | End: 2025-07-03

## 2025-07-02 RX ORDER — HYDROCODONE BITARTRATE AND ACETAMINOPHEN 7.5; 325 MG/1; MG/1
1 TABLET ORAL EVERY 6 HOURS PRN
Refills: 0 | Status: ACTIVE | OUTPATIENT
Start: 2025-07-02 | End: 2025-07-03

## 2025-07-02 RX ORDER — DIPHENHYDRAMINE HCL 25 MG
25 CAPSULE ORAL EVERY 4 HOURS PRN
Status: ACTIVE | OUTPATIENT
Start: 2025-07-02 | End: 2025-07-03

## 2025-07-02 RX ORDER — METOCLOPRAMIDE HYDROCHLORIDE 5 MG/ML
10 INJECTION INTRAMUSCULAR; INTRAVENOUS ONCE
Status: COMPLETED | OUTPATIENT
Start: 2025-07-02 | End: 2025-07-02

## 2025-07-02 RX ORDER — ONDANSETRON 2 MG/ML
4 INJECTION INTRAMUSCULAR; INTRAVENOUS EVERY 6 HOURS PRN
Status: DISCONTINUED | OUTPATIENT
Start: 2025-07-02 | End: 2025-07-03 | Stop reason: HOSPADM

## 2025-07-02 RX ORDER — CITRIC ACID/SODIUM CITRATE 334-500MG
30 SOLUTION, ORAL ORAL ONCE
Status: COMPLETED | OUTPATIENT
Start: 2025-07-02 | End: 2025-07-02

## 2025-07-02 RX ORDER — PROCHLORPERAZINE EDISYLATE 5 MG/ML
5 INJECTION INTRAMUSCULAR; INTRAVENOUS ONCE AS NEEDED
Status: ACTIVE | OUTPATIENT
Start: 2025-07-02 | End: 2025-07-02

## 2025-07-02 RX ORDER — HYDROMORPHONE HYDROCHLORIDE 1 MG/ML
0.6 INJECTION, SOLUTION INTRAMUSCULAR; INTRAVENOUS; SUBCUTANEOUS
Refills: 0 | Status: ACTIVE | OUTPATIENT
Start: 2025-07-02 | End: 2025-07-03

## 2025-07-02 RX ORDER — HYDROMORPHONE HYDROCHLORIDE 1 MG/ML
0.4 INJECTION, SOLUTION INTRAMUSCULAR; INTRAVENOUS; SUBCUTANEOUS
Refills: 0 | Status: ACTIVE | OUTPATIENT
Start: 2025-07-02 | End: 2025-07-03

## 2025-07-02 RX ORDER — NALBUPHINE HYDROCHLORIDE 10 MG/ML
2.5 INJECTION INTRAMUSCULAR; INTRAVENOUS; SUBCUTANEOUS EVERY 4 HOURS PRN
Status: ACTIVE | OUTPATIENT
Start: 2025-07-02 | End: 2025-07-03

## 2025-07-02 RX ORDER — HALOPERIDOL 5 MG/ML
0.5 INJECTION INTRAMUSCULAR ONCE AS NEEDED
Status: ACTIVE | OUTPATIENT
Start: 2025-07-02 | End: 2025-07-02

## 2025-07-02 RX ORDER — FAMOTIDINE 20 MG/1
20 TABLET, FILM COATED ORAL ONCE
Status: COMPLETED | OUTPATIENT
Start: 2025-07-02 | End: 2025-07-02

## 2025-07-02 RX ORDER — LIDOCAINE HYDROCHLORIDE 10 MG/ML
INJECTION, SOLUTION INFILTRATION; PERINEURAL
Status: COMPLETED | OUTPATIENT
Start: 2025-07-02 | End: 2025-07-02

## 2025-07-02 RX ORDER — DIPHENHYDRAMINE HYDROCHLORIDE 50 MG/ML
12.5 INJECTION, SOLUTION INTRAMUSCULAR; INTRAVENOUS EVERY 4 HOURS PRN
Status: ACTIVE | OUTPATIENT
Start: 2025-07-02 | End: 2025-07-03

## 2025-07-02 RX ORDER — NALOXONE HYDROCHLORIDE 0.4 MG/ML
0.08 INJECTION, SOLUTION INTRAMUSCULAR; INTRAVENOUS; SUBCUTANEOUS
Status: ACTIVE | OUTPATIENT
Start: 2025-07-02 | End: 2025-07-03

## 2025-07-02 RX ORDER — SODIUM CHLORIDE, SODIUM LACTATE, POTASSIUM CHLORIDE, CALCIUM CHLORIDE 600; 310; 30; 20 MG/100ML; MG/100ML; MG/100ML; MG/100ML
125 INJECTION, SOLUTION INTRAVENOUS CONTINUOUS
Status: DISCONTINUED | OUTPATIENT
Start: 2025-07-02 | End: 2025-07-03 | Stop reason: HOSPADM

## 2025-07-02 RX ORDER — FAMOTIDINE 10 MG/ML
20 INJECTION, SOLUTION INTRAVENOUS ONCE
Status: COMPLETED | OUTPATIENT
Start: 2025-07-02 | End: 2025-07-02

## 2025-07-02 RX ORDER — ENOXAPARIN SODIUM 100 MG/ML
40 INJECTION SUBCUTANEOUS ONCE
Status: DISCONTINUED | OUTPATIENT
Start: 2025-07-03 | End: 2025-07-03 | Stop reason: HOSPADM

## 2025-07-02 RX ORDER — METOCLOPRAMIDE 10 MG/1
10 TABLET ORAL ONCE
Status: COMPLETED | OUTPATIENT
Start: 2025-07-02 | End: 2025-07-02

## 2025-07-02 RX ORDER — ACETAMINOPHEN 500 MG
1000 TABLET ORAL ONCE
Status: COMPLETED | OUTPATIENT
Start: 2025-07-02 | End: 2025-07-02

## 2025-07-02 RX ADMIN — SODIUM CHLORIDE, SODIUM LACTATE, POTASSIUM CHLORIDE, CALCIUM CHLORIDE: 600; 310; 30; 20 INJECTION, SOLUTION INTRAVENOUS at 22:57:00

## 2025-07-02 RX ADMIN — LIDOCAINE HYDROCHLORIDE 5 ML: 10 INJECTION, SOLUTION INFILTRATION; PERINEURAL at 23:20:00

## 2025-07-02 RX ADMIN — BUPIVACAINE HYDROCHLORIDE 1.5 ML: 7.5 INJECTION, SOLUTION INTRASPINAL at 23:20:00

## 2025-07-02 RX ADMIN — ONDANSETRON 4 MG: 2 INJECTION INTRAMUSCULAR; INTRAVENOUS at 23:37:00

## 2025-07-02 RX ADMIN — MORPHINE SULFATE 0.3 MG: 1 INJECTION, SOLUTION EPIDURAL; INTRATHECAL; INTRAVENOUS at 23:20:00

## 2025-07-02 NOTE — H&P
[facility]  part of [facility]    History & Physical    Karen Espinosa Patient Status:  Inpatient    1985 MRN [id number]   Location [facility] BIRTH [facility] Attending Sean Lagos, DO   Hosp Day # 0 PCP Luis Arevalo MD     Date of Admission:  2025    SUBJECTIVE:    Karen Espinosa is a 39 year old  at 38w1d with 7/15/2025, by Last Menstrual Period who presents for scheuled NST and BPs mild range.  Had elevated BPs in office last week prompting [facility] [place] eval for preE.  Labs okay here, but BPs meet criteria for gHTN.  On EFM, baby also had  4-5 min decel that resolved with position changes.   Patient denies HA, blurry vision, cp, sob, ruq pain.  Denies ctx, lof or vb.  +fetal movement.      Lab Results   Component Value Date    GBS Positive (A) 2025       Lab Results   Component Value Date    ABO BLOOD TYPE O 06/10/2025    RH BLOOD TYPE Negative 06/10/2025    HGB 11.1 (L) 2025    .0 2025    HCT 32.8 (L) 2025    Rubella IgG Positive 2024    Treponemal Antibodies Nonreactive 2025    HIV Antigen Antibody Combo Non-Reactive 2025    V. Zoster IgG Immunity 2.290 2024          Problem List:   Patient Active Problem List    Diagnosis    Breech presentation (HCC)    Positive GBS test     Abx in labor      Death of family member     3/27/25-Mother acute  yesterday from car accident; LOBH order placed      Maternal morbid obesity in second trimester, antepartum (HCC) [O99.212, E66.01]     See AMA plan      Multigravida of advanced maternal age in first trimester (HCC)     25  EFW 1311 g ( 2 lb 14 oz); 66%.     Normal level 2; Monthly growth at 28 wks per MFM and NSTs starting at 34 wks    Options of FTS, CVS, amnio, genetic counseling, Level 1 vs level 2 u/s reviewed.    If 35-38 y/o by EDC, then [  ] u/s at 32 wks [  ] weekly NST at 36        History of  delivery     2025   Undecided on BTL via BS -- declines BTL.  Repeat at 39 weeks - -scheduled for 25      History of pre-eclampsia     Delivered at 37w3d      Family history of Downs syndrome     Maternal uncle       Rh negative state in antepartum period (HCC)     Rhogam      BMI 40.6     25 EFW 71% -- NSTs at 34 wks    If BMI over 35, [  ] level 2 u/s & then follow MFM recs               Frequent UTI     21--Citerobacter  Urine culture q trimester       Obstetric History:   OB History    Para Term  AB Living   3 1 1 0 1 1   SAB IAB Ectopic Multiple Live Births   1 0 0  1      # Outcome Date GA Lbr Jonas/2nd Weight Sex Type Anes PTL Lv   3 Current            2 SAB 24 10w2d   X SAB None     1 Term 21 37w3d  6 lb 5.2 oz (2.87 kg) M Caesarean Spinal N AUDRA      Complications: Preeclampsia     Past Medical History:   Past Medical History:    Anxiety    Decorative tattoo    Frequent UTI    Used to have 2 UTIs yearly but has since resolved    History of chicken pox    7 years old     Past Social History:   Past Surgical History:   Procedure Laterality Date      2021     Family History:   Family History   Problem Relation Age of Onset    Other (Other) Mother         hysterectomy-menorrhagia    Hypertension Mother     Cancer Maternal Aunt         Dx with breast cancer in her 40's and passed in 50's.    Ovarian Cancer Maternal Aunt 40        Hysterectomy    Brain Cancer Maternal Uncle      Social History:   Social History     Tobacco Use    Smoking status: Never    Smokeless tobacco: Never   Substance Use Topics    Alcohol use: Not Currently       Home Meds:   Medications Prior to Admission   Medication Sig Dispense Refill Last Dose/Taking    aspirin 81 MG Oral Chew Tab Chew by mouth daily.   2025    prenatal vitamin with DHA 27-0.8-228 MG Oral Cap Take 1 capsule by mouth in the morning.   2025    valACYclovir 1 G Oral Tab Take 1 tablet (1,000 mg total) by mouth every 12 (twelve)  hours. 30 tablet 3      Allergies:   Allergies   Allergen Reactions    Compazine [Prochlorperazine] OTHER (SEE COMMENTS)     Muscle spasms in mouth       OBJECTIVE:    Pulse:  [77-91] 85  BP: (114-149)/(55-89) 117/72    General: Alert and Oriented  Abdomen: Soft, Gravid NT Uterus    FHT: cat 1 before and after 4-5m decel  TOCOS: q2-3m---not felt by pt     Lab Review:  Results for orders placed or performed during the hospital encounter of 07/02/25   Comp Metabolic Panel (14)    Collection Time: 07/02/25 11:50 AM   Result Value Ref Range    Glucose 96 70 - 99 mg/dL    Sodium 134 (L) 136 - 145 mmol/L    Potassium 4.0 3.5 - 5.1 mmol/L    Chloride 104 98 - 112 mmol/L    CO2 24.0 21.0 - 32.0 mmol/L    Anion Gap 6 0 - 18 mmol/L    BUN 6 (L) 9 - 23 mg/dL    Creatinine 0.52 (L) 0.55 - 1.02 mg/dL    BUN/CREA Ratio 11.5 10.0 - 20.0    Calcium, Total 9.0 8.7 - 10.4 mg/dL    Calculated Osmolality 275 275 - 295 mOsm/kg    eGFR-Cr 123 >=60 mL/min/1.73m2    ALT 8 (L) 10 - 49 U/L    AST 12 <34 U/L    Alkaline Phosphatase 101 (H) 37 - 98 U/L    Bilirubin, Total 0.4 0.3 - 1.2 mg/dL    Total Protein 6.0 5.7 - 8.2 g/dL    Albumin 3.9 3.2 - 4.8 g/dL    Globulin  2.1 2.0 - 3.5 g/dL    A/G Ratio 1.9 1.0 - 2.0    Patient Fasting for CMP? No    CBC With Differential With Platelet    Collection Time: 07/02/25 11:50 AM   Result Value Ref Range    WBC 9.2 4.0 - 11.0 x10(3) uL    RBC 3.70 (L) 3.80 - 5.30 x10(6)uL    HGB 11.1 (L) 12.0 - 16.0 g/dL    HCT 32.8 (L) 35.0 - 48.0 %    MCV 88.6 80.0 - 100.0 fL    MCH 30.0 26.0 - 34.0 pg    MCHC 33.8 31.0 - 37.0 g/dL    RDW-SD 42.4 35.1 - 46.3 fL    RDW 13.1 11.0 - 15.0 %    .0 150.0 - 450.0 10(3)uL    Neutrophil Absolute Prelim 7.16 1.50 - 7.70 x10 (3) uL    Neutrophil Absolute 7.16 1.50 - 7.70 x10(3) uL    Lymphocyte Absolute 1.36 1.00 - 4.00 x10(3) uL    Monocyte Absolute 0.60 0.10 - 1.00 x10(3) uL    Eosinophil Absolute 0.04 0.00 - 0.70 x10(3) uL    Basophil Absolute 0.03 0.00 - 0.20 x10(3)  uL    Immature Granulocyte Absolute 0.03 0.00 - 1.00 x10(3) uL    Neutrophil % 77.7 %    Lymphocyte % 14.8 %    Monocyte % 6.5 %    Eosinophil % 0.4 %    Basophil % 0.3 %    Immature Granulocyte % 0.3 %   Protein/Creatinine Ratio, Urine Random    Collection Time: 25 11:50 AM   Result Value Ref Range    Total Protein Urine Random 20.9 (H) <14.0 mg/dL    Creatinine Ur Random 91.80 mg/dL    Urine Protein/Creatinine Ratio, Random 0.23         ASSESSMENT:    Patient is a  at 38w1d presents for scheduled NST and diagnosed with gHTN based on BP.  Labs okay.  Plan for RCS today.  Declines sterilization.  Last ate @ 1030am and anesthesia requesting 8 hours before surgery unless FHTs concerning.      Reviewed risks of csection including but not limited to bleeding, infection, injury, need for blood transfusion and additional procedure.  NPO, IVFs, consents, Anesthesia to see, SCDs, Antibiotics: Ancef 2g.  All questions answered.  Pt voices understanding and agrees to proceed.      Sean Lagos,   2025  1:54 PM

## 2025-07-03 ENCOUNTER — TELEPHONE (OUTPATIENT)
Dept: OBGYN CLINIC | Facility: CLINIC | Age: 40
End: 2025-07-03

## 2025-07-03 PROBLEM — O13.3 GESTATIONAL HYPERTENSION, THIRD TRIMESTER (HCC): Status: ACTIVE | Noted: 2025-07-03

## 2025-07-03 LAB — FETAL SCREEN RESULT: NEGATIVE

## 2025-07-03 PROCEDURE — 59514 CESAREAN DELIVERY ONLY: CPT | Performed by: OBSTETRICS & GYNECOLOGY

## 2025-07-03 RX ORDER — BISACODYL 10 MG
10 SUPPOSITORY, RECTAL RECTAL ONCE AS NEEDED
Status: DISCONTINUED | OUTPATIENT
Start: 2025-07-03 | End: 2025-07-06

## 2025-07-03 RX ORDER — OXYCODONE HYDROCHLORIDE 5 MG/1
5 TABLET ORAL EVERY 6 HOURS PRN
Status: DISCONTINUED | OUTPATIENT
Start: 2025-07-03 | End: 2025-07-06

## 2025-07-03 RX ORDER — HYDROMORPHONE HYDROCHLORIDE 1 MG/ML
0.3 INJECTION, SOLUTION INTRAMUSCULAR; INTRAVENOUS; SUBCUTANEOUS EVERY 2 HOUR PRN
Status: DISCONTINUED | OUTPATIENT
Start: 2025-07-03 | End: 2025-07-06

## 2025-07-03 RX ORDER — ENOXAPARIN SODIUM 100 MG/ML
40 INJECTION SUBCUTANEOUS DAILY
Status: DISCONTINUED | OUTPATIENT
Start: 2025-07-04 | End: 2025-07-06

## 2025-07-03 RX ORDER — IBUPROFEN 600 MG/1
600 TABLET, FILM COATED ORAL EVERY 6 HOURS
Status: DISCONTINUED | OUTPATIENT
Start: 2025-07-04 | End: 2025-07-06

## 2025-07-03 RX ORDER — ACETAMINOPHEN 500 MG
1000 TABLET ORAL EVERY 6 HOURS
Status: DISCONTINUED | OUTPATIENT
Start: 2025-07-03 | End: 2025-07-06

## 2025-07-03 RX ORDER — GABAPENTIN 300 MG/1
300 CAPSULE ORAL EVERY 8 HOURS PRN
Status: DISCONTINUED | OUTPATIENT
Start: 2025-07-03 | End: 2025-07-06

## 2025-07-03 RX ORDER — CHOLECALCIFEROL (VITAMIN D3) 25 MCG
1 TABLET,CHEWABLE ORAL DAILY
Status: DISCONTINUED | OUTPATIENT
Start: 2025-07-03 | End: 2025-07-06

## 2025-07-03 RX ORDER — AMMONIA 15 % (W/V)
0.3 AMPUL (EA) INHALATION AS NEEDED
Status: DISCONTINUED | OUTPATIENT
Start: 2025-07-03 | End: 2025-07-06

## 2025-07-03 RX ORDER — SIMETHICONE 80 MG
80 TABLET,CHEWABLE ORAL 3 TIMES DAILY PRN
Status: DISCONTINUED | OUTPATIENT
Start: 2025-07-03 | End: 2025-07-06

## 2025-07-03 RX ORDER — KETOROLAC TROMETHAMINE 30 MG/ML
30 INJECTION, SOLUTION INTRAMUSCULAR; INTRAVENOUS EVERY 6 HOURS
Status: COMPLETED | OUTPATIENT
Start: 2025-07-03 | End: 2025-07-03

## 2025-07-03 RX ORDER — ONDANSETRON 2 MG/ML
4 INJECTION INTRAMUSCULAR; INTRAVENOUS EVERY 6 HOURS PRN
Status: DISCONTINUED | OUTPATIENT
Start: 2025-07-03 | End: 2025-07-06

## 2025-07-03 RX ORDER — DOCUSATE SODIUM 100 MG/1
100 CAPSULE, LIQUID FILLED ORAL
Status: DISCONTINUED | OUTPATIENT
Start: 2025-07-03 | End: 2025-07-06

## 2025-07-03 RX ADMIN — SODIUM CHLORIDE, SODIUM LACTATE, POTASSIUM CHLORIDE, CALCIUM CHLORIDE: 600; 310; 30; 20 INJECTION, SOLUTION INTRAVENOUS at 00:10:00

## 2025-07-03 RX ADMIN — SODIUM CHLORIDE, SODIUM LACTATE, POTASSIUM CHLORIDE, CALCIUM CHLORIDE: 600; 310; 30; 20 INJECTION, SOLUTION INTRAVENOUS at 00:12:00

## 2025-07-03 NOTE — LACTATION NOTE
07/03/25 1100   Evaluation Type   Evaluation Type Inpatient   Problems identified   Problems identified Knowledge deficit   Problems Identified Other HX of low milk supply and previous child not latching well   Maternal history   Maternal history Caesarean section;Obesity;PIH;Anxiety   Breastfeeding goal   Breastfeeding goal To maintain breast milk feeding per patient goal   Maternal Assessment   Bilateral Breasts Wide spaced;Soft;Symmetrical   Bilateral Nipples Colostrum easily expressed;Everted   Prior breastfeeding experience (comment below) Multip;Unsuccessful   Breastfeeding Assistance Breastfeeding assistance provided with permission;Breast exam provided with permission;Hand expression provided with permission   Pain assessment   Location/Comment Denies   Treatment of Sore Nipples Deeper latch techniques;Expressed breast milk;Lanolin   Guidelines for use of:   Breast pump type   (Mom states she has an electric pump at home)   Current use of pump: Not indicated at this time   Other (comment) LC assisted with latch on the left breast in football hold. Able to obtain a deep sustained latch.  Mom indicates a pulling sensation.     Reviewed hand expression, breast massage/compression, deeper latch techniques, skin to skin benefits, typical I and O of infant, and waking techniques.  Pumping/supplementation guidelines reviewed. Support provided, encouraged to reach out with further questions.

## 2025-07-03 NOTE — OPERATIVE REPORT
Operative Report for  Section:    Date: 25  Patient: Karen Espinosa  : 1985  MRN: [id number]    Preoperative Diagnosis: Intrauterine Pregnancy 38w2d, previous CSection x 1, gestational hypertension  Postoperative Diagnosis: same  Procedure(s): repeat  Section via Pfannensteil Incision  Surgeon: Darrin Lagos DO  Assistant: Moose Sullivan MD  Type of anesthesia: spinal  Complications: none  Quantitative Blood Loss (mL)      IV Fluids: 1500 cc  Urine OutPut:  100 cc clear yellow urine at the end of the procedure  Antibiotics: Ancef 2g  Pathology: placenta and skin lesion along csection scar    The surgical assistant provided aid in exposure, hemostasis, closure, and other intraoperative technical functions to help the surgeon carry out a safe operation and optimize results.       Infant:  Date of Delivery: 7/3/2025   Time of Delivery: 12:11 AM    Infant Sex  Information for the patient's :  Dxa Espinosa [[id number]]   male    Infant Birthweight  Information for the patient's :  Dax Espinosa [[id number]]   No birth weight on file.     Apgars:  1 minute:                 5 minutes:                          10 minutes:        Findings:  --viable male infant in cephalic presentation delivered via vacuum assistance  -- clear amniotic fluid  --multiple large fibroids throughout uterus  --adhesions through abdominal wall, omental adhesions to abdominal wall and thick adhesions from body/fundus to abdominal wall; unable to visualize tubes and ovaries      The  section was fully reviewed with the patient/family and written informed consent was obtained.  Risks including, but not limited to bleeding, infection, injury to surrounding organs, need for blood transfusion and/or additional procedures All questions were answered.     Procedure details:  The patient was taken to the operating room after informed consent was given. A Time Out was held the patient was identified and  the procedure verified as  Delivery.    At that time she was then prepared and draped in the normal sterile fashion in the dorsal supine position with a leftward tilt, and anesthesia was found to be adequate. A Pfannenstiel skin incision was made with the scalpel and carried through to the underlying layer of fascia.  The fascia was then incised in the midline and the incision extended laterally with the galdamez scissors.  The superior aspect of the fascial incision was then grasped with Kocher clamps, elevated, and the underlying rectus muscles dissected off.  Attention was then turned to the inferior aspect of this incision which, in a similar fashion, was grasped, tented up with the Kocher clamps, and the rectus muscles dissected off.  The rectus muscles were then  in the midline and the peritoneum identified, and entered.  The peritoneal incision was then extended superiorly and inferiorly.  There were adhesions from the omentum to the anterior abdominal wall that were taken down with the ligasure.  There was thick band of scar tissue from uterus to abdominal wall that was taken down with the ligasure.  We also had to perform 3cm partial maylards b/l in the rectus muscles to adequate access the MARITZA.  It took thirty mins of careful dissection to free adhesions and allow to proceed with hysterotomy.  The brody O retractor was then placed.  The bladder blade was then inserted and the vesicouterine peritoneum identified, grasped and entered sharply with the Metzenbaum scissors.  This incision was then extended laterally and the bladder flap created digitally.      The bladder blade was then reinserted and the lower uterine segment incised in a low transverse fashion with the scalpel.  The uterine incision was then extended digitally.  The bladder blade was removed and the infants head was delivered atraumatically with vacuum assistance.  Green zone pressure, no pop offs, and pull time less than 15  sec.  The nose and mouth were suctioned with bulb suction and the cord doubly clamped then cut after 30 sec delay. The infant was handed off to the waiting IMELDA team. Cord gases were sent.    The placenta was then delivered.  The uterus was exteriorized and cleared of all clots and debris.  The uterine incision was repaired with 0-Vicryl in a running locked fashion.  A second layer of the same suture was used for hemostatic support..  The lateral gutters were cleared of all clots. Excellent hemostasis was noted again along the repaired hysterotomy.  The adhesions bed from the adhesiolysis on the uterus was bleeding.  Careful cautery, surgicel application and interceed were placed.  The omentum was then reviewed and cauterized accordingly.  The fascia was reapproximated with 0-Vicryl in a running fashion.  Irrigation was carried out.  The subcutaneous adipose layer was approximated with Plain gut in a running fashion.  The skin was closed with staples.      The patient tolerated the procedure well.  Sponge, lap, and needle counts were correct.  The patient was taken to the recovery room in stable condition.    Darrin Lagos DO    07/03/25, 1:06 AM

## 2025-07-03 NOTE — DISCHARGE SUMMARY
[facility]  part of [facility]    Discharge Summary    Karen Espinosa Patient Status:  Inpatient    1985 MRN [id number]   Location [facility] Attending Sean Lagos,    Hosp Day # 4       Delivering OB Clinician: Dr. Lagos    Admission date: 25  Discharge Date: 25    EDC: Estimated Date of Delivery: 7/15/25    Gestational Age: 38w2d    Antepartum complications:   Patient Active Problem List   Diagnosis    BMI 40.6    Frequent UTI    Rh negative state in antepartum period ([facility])    Multigravida of advanced maternal age in first trimester ([facility])    History of  delivery    History of pre-eclampsia    Family history of Downs syndrome    Maternal morbid obesity in second trimester, antepartum ([facility]) [O99.212, E66.01]    Death of family member    Positive GBS test    Breech presentation ([facility])    Pregnancy ([facility])    Gestational hypertension, third trimester ([facility])       Date of Delivery: 7/3/2025 Time of Delivery: 12:11 AM    Delivery Type: spontaneous vaginal delivery    Baby: Liveborn male   Information for the patient's :  Dax Espinosa [[id number]]   7 lb 9.7 oz (3.45 kg)  Apgars:  1 minute: 8  5 minutes: 910 minutes:       Intrapartum Complications: gHTN    Hospital Course: 38 yo  @ 38w2d presents for scheduled NST and found to have gHTN.  Underwent RCS.  Extensive adhesions from uterus and omentum to abdominal wall. POD3 BPs starting to elevate so Nifedipine 30mg XL daily started    Discharge Physical Exam:   /81 (BP Location: Right arm)   Pulse 82   Temp 97.9 °F (36.6 °C) (Oral)   Resp 16   Wt 253 lb 4.9 oz (114.9 kg)   LMP 10/08/2024 (Exact Date)   SpO2 97%   Breastfeeding Yes   BMI 46.33 kg/m²   General appearance:  alert, appears stated age, cooperative and no distress  Abdominal: soft, non-tender, no rebound  Uterus: firm, nontender, below umbilicus  Incision: clean, dry and intact with staples  Pelvic:  deferred  Extremities: Homans sign is negative, no sign of DVT    Discharged Condition: stable    Disposition: home      Plan:     Follow-up appointment in 6 weeks with Dr. Lagos

## 2025-07-03 NOTE — PROGRESS NOTES
Patient transferred to mother/baby room 359 per cart in stable condition with baby.  Accompanied by  and staff.  Report given to Rin mother/baby RN.

## 2025-07-03 NOTE — PROGRESS NOTES
Due to acuity of [facility] today, 3 cases had to go before we could proceed with Karen' .  She denies any preE symptoms.  BPs stable, however does meet criteria for gHTN.  FHT reassuring and having cramping that she does feel.  Plan to move to OR soon for .  All questions answered.

## 2025-07-03 NOTE — PLAN OF CARE
Problem: POSTPARTUM  Goal: Long Term Goal:Experiences normal postpartum course  Description: INTERVENTIONS:  - Assess and monitor vital signs and lab values.  - Assess fundus and lochia.  - Provide ice/sitz baths for perineum discomfort.  - Monitor healing of incision/episiotomy/laceration, and assess for signs and symptoms of infection and hematoma.  - Assess bladder function and monitor for bladder distention.  - Provide/instruct/assist with pericare as needed.  - Provide VTE prophylaxis as needed.  - Monitor bowel function.  - Encourage ambulation and provide assistance as needed.  - Assess and monitor emotional status and provide social service/psych resources as needed.  - Utilize standard precautions and use personal protective equipment as indicated. Ensure aseptic care of all intravenous lines and invasive tubes/drains.  - Obtain immunization and exposure to communicable diseases history.  Outcome: Progressing  Goal: Optimize infant feeding at the breast  Description: INTERVENTIONS:  - Initiate breast feeding within first hour after birth.   - Monitor effectiveness of current breast feeding efforts.  - Assess support systems available to mother/family.  - Identify cultural beliefs/practices regarding lactation, letdown techniques, maternal food preferences.  - Assess mother's knowledge and previous experience with breast feeding.  - Provide information as needed about early infant feeding cues (e.g., rooting, lip smacking, sucking fingers/hand) versus late cue of crying.  - Discuss/demonstrate breast feeding aids (e.g., infant sling, nursing footstool/pillows, and breast pumps).  - Encourage mother/other family members to express feelings/concerns, and actively listen.  - Educate father/SO about benefits of breast feeding and how to manage common lactation challenges.  - Recommend avoidance of specific medications or substances incompatible with breast feeding.  - Assess and monitor for signs of nipple  pain/trauma.  - Instruct and provide assistance with proper latch.  - Review techniques for milk expression (breast pumping) and storage of breast milk. Provide pumping equipment/supplies, instructions and assistance, as needed.  - Encourage rooming-in and breast feeding on demand.  - Encourage skin-to-skin contact.  - Provide LC support as needed.  - Assess for and manage engorgement.  - Provide breast feeding education handouts and information on community breast feeding support.   Outcome: Progressing  Goal: Establishment of adequate milk supply with medication/procedure interruptions  Description: INTERVENTIONS:  - Review techniques for milk expression (breast pumping).   - Provide pumping equipment/supplies, instructions, and assistance until it is safe to breastfeed infant.  Outcome: Progressing  Goal: Experiences normal breast weaning course  Description: INTERVENTIONS:  - Assess for and manage engorgement.  - Instruct on breast care.  - Provide comfort measures.  Outcome: Progressing  Goal: Appropriate maternal -  bonding  Description: INTERVENTIONS:  - Assess caregiver- interactions.  - Assess caregiver's emotional status and coping mechanisms.  - Encourage caregiver to participate in  daily care.  - Assess support systems available to mother/family.  - Provide /case management support as needed.  Outcome: Progressing     Problem: GENITOURINARY - ADULT  Goal: Absence of urinary retention  Description: INTERVENTIONS:  - Assess patient’s ability to void and empty bladder  - Monitor intake/output and perform bladder scan as needed  - Follow urinary retention protocol/standard of care  - Consider collaborating with pharmacy to review patient's medication profile  - Implement strategies to promote bladder emptying  Outcome: Progressing     Problem: GASTROINTESTINAL - ADULT  Goal: Minimal or absence of nausea and vomiting  Description: INTERVENTIONS:  - Maintain adequate  hydration with IV or PO as ordered and tolerated  - Nasogastric tube to low intermittent suction as ordered  - Evaluate effectiveness of ordered antiemetic medications  - Provide nonpharmacologic comfort measures as appropriate  - Advance diet as tolerated, if ordered  - Obtain nutritional consult as needed  - Evaluate fluid balance  Outcome: Progressing  Goal: Maintains or returns to baseline bowel function  Description: INTERVENTIONS:  - Assess bowel function  - Maintain adequate hydration with IV or PO as ordered and tolerated  - Evaluate effectiveness of GI medications  - Encourage mobilization and activity  - Obtain nutritional consult as needed  - Establish a toileting routine/schedule  - Consider collaborating with pharmacy to review patient's medication profile  Outcome: Progressing  Goal: Maintains adequate nutritional intake (undernourished)  Description: INTERVENTIONS:  - Monitor percentage of each meal consumed  - Identify factors contributing to decreased intake, treat as appropriate  - Assist with meals as needed  - Monitor I&O, WT and lab values  - Obtain nutritional consult as needed  - Optimize oral hygiene and moisture  - Encourage food from home; allow for food preferences  - Enhance eating environment  Outcome: Progressing  Goal: Achieves appropriate nutritional intake (bariatric)  Description: INTERVENTIONS:  - Monitor for over-consumption  - Identify factors contributing to increased intake, treat as appropriate  - Monitor I&O, WT and lab values  - Obtain nutritional consult as needed  - Evaluate psychosocial factors contributing to over-consumption  Outcome: Progressing     Problem: Patient/Family Goals  Goal: Patient/Family Long Term Goal  Description: Patient's Long Term Goal    Interventions:  -   - See additional Care Plan goals for specific interventions  Outcome: Progressing  Goal: Patient/Family Short Term Goal  Description: Patient's Short Term Goal:     Interventions:   -   - See  additional Care Plan goals for specific interventions  Outcome: Progressing     Problem: Patient Centered Care  Goal: Patient preferences are identified and integrated in the patient's plan of care  Description: Interventions:  - What would you like us to know as we care for you?   - Provide timely, complete, and accurate information to patient/family  - Incorporate patient and family knowledge, values, beliefs, and cultural backgrounds into the planning and delivery of care  - Encourage patient/family to participate in care and decision-making at the level they choose  - Honor patient and family perspectives and choices  Outcome: Progressing

## 2025-07-03 NOTE — ANESTHESIA POSTPROCEDURE EVALUATION
Patient: Karen Espinosa    Procedure Summary       Date: 25 Room / Location: [facility] L+D OR 02 / [facility] L+D OR    Anesthesia Start:  Anesthesia Stop:     Procedure:  SECTION (Abdomen) Diagnosis: (REPEAT  7/15 GHIA)    Surgeons: Sean Lagos DO Anesthesiologist: Hanna Billings MD    Anesthesia Type: spinal ASA Status: 3            Anesthesia Type: spinal    Vitals Value Taken Time   /69 25 01:34   Temp  25 01:40   Pulse 60 25 01:39   Resp 21 25 01:39   SpO2 100 % 25 01:39   Vitals shown include unfiled device data.    EM AN Post Evaluation:   Patient Evaluated in PACU  Patient Participation: complete - patient participated  Level of Consciousness: awake and alert  Pain Score: 0  Pain Management: adequate  Airway Patency:patent  Dental exam unchanged from preop  Yes    Nausea/Vomiting: none  Cardiovascular Status: stable  Respiratory Status: room air  Postoperative Hydration stable      HANNA BILLINGS MD  7/3/2025 1:40 AM

## 2025-07-03 NOTE — TELEPHONE ENCOUNTER
Delivered via [facility] 7/3/25.  Please cancel future appointments and scheduled assist

## 2025-07-03 NOTE — PROGRESS NOTES
Patient arrived to postpartum unit, room 359, via cart and accompanied by staff and significant other. Patient belongings placed in room. Report received from L&D RN Vivian. ID bands were compared and verified with parents at bedside. Safe sleep education given to parents, parents verbalized their understanding. Plan of care discussed with parents. Patients are in stable condition.

## 2025-07-03 NOTE — ANESTHESIA PROCEDURE NOTES
Spinal Block    Date/Time: 7/2/2025 11:20 PM    Performed by: Hanna Germain MD  Authorized by: Hanna Germain MD      General Information and Staff    Start Time:  7/2/2025 11:20 PM  End Time:  7/2/2025 11:28 PM  Anesthesiologist:  Hanna Germain MD  Performed by:  Anesthesiologist  Site identification: surface landmarks    Reason for Block: at surgeon's request, post-op pain management and surgical anesthesia    Preanesthetic Checklist: patient identified, IV checked, risks and benefits discussed, monitors and equipment checked, pre-op evaluation, timeout performed, anesthesia consent and sterile technique used      Procedure Details    Patient Position:  Sitting  Prep: ChloraPrep and patient draped    Monitoring:  Cardiac monitor, continuous pulse ox and heart rate  Approach:  Midline  Location:  L3-4  Injection Technique:  Single-shot    Needle    Needle Type:  Pencil-tip  Needle Gauge:  24 G  Needle Length:  3.5 in  Needle Insertion Depth:  6    Assessment    Sensory Level:   Events: clear CSF, CSF aspirated, well tolerated and blood negative      Additional Comments

## 2025-07-03 NOTE — ANESTHESIA PREPROCEDURE EVALUATION
Anesthesia PreOp Note    HPI:     Karen Espinosa is a 39 year old female who presents for preoperative consultation requested by: * No surgeons listed *    Date of Surgery: 2025    * No procedures listed *  Indication: * No pre-op diagnosis entered *    Relevant Problems   No relevant active problems       NPO:  Last Liquid Consumption Date: 25  Last Liquid Consumption Time: 1100  Last Solid Consumption Date: 25  Last Solid Consumption Time: 1030  Last Liquid Consumption Date: 25          History Review:  Patient Active Problem List    Diagnosis Date Noted    Breech presentation ([facility]) 2025    Pregnancy ([facility]) 2025    Positive GBS test 2025    Death of family member 2025    Maternal morbid obesity in second trimester, antepartum ([facility]) [O99.212, E66.01] 2025    Multigravida of advanced maternal age in first trimester ([facility]) 12/10/2024    History of  delivery 12/10/2024    History of pre-eclampsia 12/10/2024    Family history of Downs syndrome 12/10/2024    Rh negative state in antepartum period ([facility]) 2021    BMI 40.6 2020    Frequent UTI 2020       Past Medical History[1]    Past Surgical History[2]    Prescriptions Prior to Admission[3]  Current Medications and Prescriptions Ordered in Epic[4]    Allergies[5]    Family History[6]  Social Hx on file[7]    Available pre-op labs reviewed.  Lab Results   Component Value Date    WBC 9.2 2025    RBC 3.70 (L) 2025    HGB 11.1 (L) 2025    HCT 32.8 (L) 2025    MCV 88.6 2025    MCH 30.0 2025    MCHC 33.8 2025    RDW 13.1 2025    .0 2025     Lab Results   Component Value Date     (L) 2025    K 4.0 2025     2025    CO2 24.0 2025    BUN 6 (L) 2025    CREATSERUM 0.52 (L) 2025    GLU 96 2025    CA 9.0 2025          Vital Signs:  Body mass index is 46.33 kg/m².   weight is 114.9 kg (253  lb 4.9 oz). Her oral temperature is 97.7 °F (36.5 °C). Her blood pressure is 134/69 and her pulse is 67. Her respiration is 17 and oxygen saturation is 98%.   Vitals:    07/02/25 1645 07/02/25 1700 07/02/25 1930 07/02/25 2100   BP: 121/71 121/71  134/69   Pulse:    67   Resp:    17   Temp:    97.7 °F (36.5 °C)   TempSrc:    Oral   SpO2: 98% 98%     Weight:   114.9 kg (253 lb 4.9 oz)         Anesthesia Evaluation     Nursing notes reviewed    Airway   Mallampati: II  TM distance: >3 FB  Neck ROM: full  Dental      Pulmonary    Cardiovascular - normal exam  Exercise tolerance: good  (+) hypertension    NYHA Classification: I    Neuro/Psych    (+)  anxiety/panic attacks,        GI/Hepatic/Renal    (-) liver disease, renal disease    Endo/Other    (-) diabetes mellitus, hypothyroidism  Abdominal   (+) obese     Other findings: 07/02/25 11:50  Glucose: 96  Sodium: 134 (L)  Potassium: 4.0  Chloride: 104  Carbon Dioxide, Total: 24.0  BUN: 6 (L)  CREATININE: 0.52 (L)  CALCIUM: 9.0  BUN/CREATININE RATIO: 11.5  EGFR: 123  ANION GAP: 6  CALCULATED OSMOLALITY: 275  ALKALINE PHOSPHATASE: 101 (H)  AST (SGOT): 12  ALT (SGPT): 8 (L)  Total Bilirubin: 0.4  Globulin: 2.1  A/G Ratio: 1.9  PROTEIN, TOTAL: 6.0  Albumin: 3.9  Patient Fasting for CMP?: No  WBC: 9.2  Hemoglobin: 11.1 (L)  Hematocrit: 32.8 (L)  Platelet Count: 185.0  RBC: 3.70 (L)  MCH: 30.0    No headache no blurred vison no RUQ pain  (L): Data is abnormally low  (H): Data is abnormally high            Anesthesia Plan:   ASA:  3  Plan:   Spinal  Post-op Pain Management: Intrathecal narcotics  Informed Consent Plan and Risks Discussed With:  Patient and spouse  Discussed plan with:  Attending and surgeon  Provider Attestation (if preop done by other):  SA      I have informed Karen Espinosa and/or legal guardian or family member of the nature of the anesthetic plan, benefits, risks including possible dental damage if relevant, major complications, and any alternative forms  of anesthetic management.   All of the patient's questions were answered to the best of my ability. The patient desires the anesthetic management as planned.  I have informed this Karen Espinosa [relative] of the risks of neuraxial anesthesia including, but not limited to: failure,headache, backache, spinal, unilateral/patchy block, difficulty breathing, itching,infection, bleeding, nerve damage, paralysis, death. The patient desires the proposed neuraxial anesthetic as planned.    MONICA BILLINGS MD  2025 11:04 PM  Present on Admission:  **None**           [1]   Past Medical History:   Anxiety    Decorative tattoo    Frequent UTI    Used to have 2 UTIs yearly but has since resolved    History of chicken pox    7 years old   [2]   Past Surgical History:  Procedure Laterality Date      2021   [3]   Medications Prior to Admission   Medication Sig Dispense Refill Last Dose/Taking    aspirin 81 MG Oral Chew Tab Chew by mouth daily.   2025    prenatal vitamin with DHA 27-0.8-228 MG Oral Cap Take 1 capsule by mouth in the morning.   2025    valACYclovir 1 G Oral Tab Take 1 tablet (1,000 mg total) by mouth every 12 (twelve) hours. 30 tablet 3    [4]   Current Facility-Administered Medications Ordered in Epic   Medication Dose Route Frequency Provider Last Rate Last Admin    lactated ringers infusion  125 mL/hr Intravenous Continuous Sean Lagos  mL/hr at 25 182 125 mL/hr at 25 182    enoxaparin (Lovenox) 40 MG/0.4ML SUBQ injection 40 mg  40 mg Subcutaneous Daily Sean Lagos DO        ondansetron (Zofran) 4 MG/2ML injection 4 mg  4 mg Intravenous Q6H PRN Sean Lagos DO        oxyTOCIN in sodium chloride 0.9% (Pitocin) 30 Units/500mL infusion premix  62.5-900 dima-units/min Intravenous Continuous Sean Lagos DO        ceFAZolin (Ancef) 2g in 10mL IV syringe premix  2 g Intravenous Once Sean Lagos DO         No current Epic-ordered  outpatient medications on file.   [5]   Allergies  Allergen Reactions    Compazine [Prochlorperazine] OTHER (SEE COMMENTS)     Muscle spasms in mouth   [6]   Family History  Problem Relation Age of Onset    Other (Other) Mother         hysterectomy-menorrhagia    Hypertension Mother     Cancer Maternal Aunt         Dx with breast cancer in her 40's and passed in 50's.    Ovarian Cancer Maternal Aunt 40        Hysterectomy    Brain Cancer Maternal Uncle    [7]   Social History  Socioeconomic History    Marital status: Single   Tobacco Use    Smoking status: Never    Smokeless tobacco: Never   Vaping Use    Vaping status: Never Used   Substance and Sexual Activity    Alcohol use: Not Currently    Drug use: Never    Sexual activity: Yes     Partners: Male     Comment: same partner

## 2025-07-03 NOTE — LACTATION NOTE
This note was copied from a baby's chart.     07/03/25 1100   Evaluation Type   Evaluation Type Inpatient   Problems & Assessment   Problems Diagnosed or Identified Latch difficulty   Infant Assessment Hunger cues present;Skin color: pink or appropriate for ethnicity   Muscle tone Appropriate for GA   Feeding Assessment   Summary Current Feeding Breastfeeding with formula supplement   Last 24 hour feeding summary See I and O   Breastfeeding Assessment Assisted with breastfeeding w/mother's permission;Deep latch achieved and observed;Calm and ready to breastfeed;Tolerated feeding well;Coordinated suck/swallow   Breastfeeding lasted # of minutes 10   Breastfeeding Positions football;left breast   Latch 2   Audible Sucks/Swallows 1   Type of Nipple 2   Comfort (Breast/Nipple) 2   Hold (Positioning) 2   LATCH Score 9   Other (comment) LC assisted with latch on the left breast in football hold. Able to obtain a deep sustained latch. Mom indicates a pulling sensation.        Linear

## 2025-07-03 NOTE — PAYOR COMM NOTE
--------------  ADMISSION REVIEW     Payor: [facility]  Subscriber #:  [id number]  Authorization Number: [id number]    Admit date: 25  Admit time:  1:42 PM        History and Physical           Karen Espinosa is a 39 year old  at 38w1d with 7/15/2025, by Last Menstrual Period who presents for scheuled NST and BPs mild range.  Had elevated BPs in office last week prompting [facility] eval for preE.  Labs okay here, but BPs meet criteria for gHTN.  On EFM, baby also had  4-5 min decel that resolved with position changes.   Patient denies HA, blurry vision, cp, sob, ruq pain.  Denies ctx, lof or vb.  +fetal movement.      ASSESSMENT:     Patient is a  at 38w1d presents for scheduled NST and diagnosed with gHTN based on BP.  Labs okay.  Plan for RCS today.  Declines sterilization.  Last ate @ 1030am and anesthesia requesting 8 hours before surgery unless FHTs concerning.       Reviewed risks of csection including but not limited to bleeding, infection, injury, need for blood transfusion and additional procedure.  NPO, IVFs, consents, Anesthesia to see, SCDs, Antibiotics: Ancef 2g.  All questions answered.  Pt voices understanding and agrees to proceed.           Operative Report for  Section:     Date: 25  Patient: Karen Espinosa  : 1985  MRN: [id number]     Preoperative Diagnosis: Intrauterine Pregnancy 38w2d, previous CSection x 1, gestational hypertension  Postoperative Diagnosis: same  Procedure(s): repeat  Section via Pfannensteil Incision  Surgeon: Darrin Lagos DO  Assistant: Moose Sullivan MD  Type of anesthesia: spinal  Complications: none  Quantitative Blood Loss (mL)       IV Fluids: 1500 cc  Urine OutPut:  100 cc clear yellow urine at the end of the procedure  Antibiotics: Ancef 2g  Pathology: placenta and skin lesion along csection scar     The surgical assistant provided aid in exposure, hemostasis, closure, and  other intraoperative technical functions to help the surgeon carry out a safe operation and optimize results.         Infant:  Date of Delivery: 7/3/2025   Time of Delivery: 12:11 AM     Infant Sex  Information for the patient's :  Dax Espinosa [[id number]]   male           Nursing Note:    Patient transferred to mother/baby room 359 per cart in stable condition with baby.  Accompanied by  and staff.            MEDICATIONS ADMINISTERED IN LAST 1 DAY:  acetaminophen (Tylenol Extra Strength) tab 1,000 mg       Date Action Dose Route User    2025 1723 Given 1,000 mg Oral Tamiko Ca RN          bupivacaine in dextrose (Marcaine) 0.75-8.25 % intrathecal/spinal injection       Date Action Dose Route User    2025 2320 Given 1.5 mL Intrathecal Hanna Germain MD          ceFAZolin (Ancef) 2g in 10mL IV syringe premix       Date Action Dose Route User    2025 2332 Given 2 g Intravenous Hanna Germain MD          famotidine (Pepcid) 20 mg/2mL injection 20 mg       Date Action Dose Route User    2025 2237 Given 20 mg Intravenous Vivian Mark RN          ketorolac (Toradol) 30 MG/ML injection 30 mg       Date Action Dose Route User    7/3/2025 0929 Given 30 mg Intravenous Aruna Yeboah RN    7/3/2025 0332 Given 30 mg Intravenous Vivian Mark RN          lactated ringers IV bolus 1,000 mL       Date Action Dose Route User    2025 1722 New Bag 1,000 mL Intravenous Tamiko Ca RN          lactated ringers infusion       Date Action Dose Route User    7/3/2025 0010 New Bag (none) Intravenous Hanna Germain MD    2025 2316 Continued by Anesthesia (none) Intravenous Hanna Germain MD    2025 2257 New Bag (none) Intravenous Hanna Germain MD    2025 1821 New Bag 125 mL/hr Intravenous Tamiko Ca RN          lidocaine (Xylocaine) 1 % injection       Date Action Dose Route User    2025 2320 Given 5 mL Intradermal Hanna Germain MD          metoclopramide (Reglan) 5  mg/mL injection 10 mg       Date Action Dose Route User    7/2/2025 2239 Given 10 mg Intravenous Vivian Mark RN          morphINE (PF) 1 MG/ML injection       Date Action Dose Route User    7/2/2025 2320 Given 0.3 mg Intrathecal Hanna Germain MD          ondansetron (Zofran) 4 MG/2ML injection       Date Action Dose Route User    7/2/2025 2337 Given 4 mg Intravenous Hanna Germain MD          oxyTOCIN in sodium chloride 0.9% (Pitocin) 30 Units/500mL infusion premix       Date Action Dose Route User    7/3/2025 0110 Restarted 300 dima-units/min Intravenous Vivian Mark RN          oxyTOCIN in sodium chloride 0.9% (Pitocin) 30 Units/500mL infusion premix       Date Action Dose Route User    7/3/2025 0024 Rate/Dose Change 300 mL/hr Intravenous Hanna Germain MD    7/3/2025 0015 Rate/Dose Change 600 mL/hr Intravenous Hanna Germain MD    7/3/2025 0013 New Bag 300 mL/hr Intravenous Hanna Germain MD          oxyTOCIN in sodium chloride 0.9% (Pitocin) 30 Units/500mL infusion premix       Date Action Dose Route User    7/3/2025 0201 New Bag 62.5 dima-units/min Intravenous Vivian Mark RN    7/3/2025 0130 Rate/Dose Change 62.5 dima-units/min Intravenous Vivian Mark RN          Rho D immune globulin (Rhophylac) 1500 Units/2mL injection 300 mcg       Date Action Dose Route User    7/3/2025 0417 Given 300 mcg Intravenous (Peripheral Arterial Line) Rin Childress RN          sodium citrate-citric acid (Bicitra) 500-334 MG/5ML oral solution 30 mL       Date Action Dose Route User    7/2/2025 2236 Given 30 mL Oral Vivian Mark RN            Vitals (last day)       Date/Time Temp Pulse Resp BP SpO2 Weight O2 Device O2 Flow Rate (L/min) [facility]    07/03/25 0924 98.2 °F (36.8 °C) 74 15 131/74 97 % -- None (Room air) --     07/03/25 0550 98 °F (36.7 °C) 63 16 119/59 97 % -- None (Room air) --     07/03/25 0350 97.6 °F (36.4 °C) 63 18 121/79 98 % -- None (Room air) --     07/03/25 0300 -- 63  18 111/69 98 % -- -- -- SB    07/03/25 0250 -- 58 17 112/68 97 % -- -- -- SB    07/03/25 0240 -- 57 16 122/74 98 % -- -- -- SB    07/03/25 0230 -- 59 25 116/73 98 % -- -- -- SB    07/03/25 0220 -- 55 16 114/74 97 % -- -- -- SB    07/03/25 0219 -- 58 20 104/83 97 % -- -- -- SB    07/03/25 0210 -- 58 21 104/83 99 % -- -- -- SB    07/03/25 0201 97.7 °F (36.5 °C) -- -- -- -- -- -- -- SB    07/03/25 0200 -- 60 22 124/70 99 % -- -- -- SB    07/03/25 0150 -- 62 17 121/79 98 % -- -- -- SB    07/03/25 0140 -- 58 21 118/72 100 % -- -- -- SB    07/03/25 0130 -- 62 21 123/69 99 % -- -- -- SB    07/03/25 0120 -- 59 16 114/65 100 % -- -- -- SB    07/03/25 0110 -- 61 17 111/70 99 % -- -- -- SB    07/02/25 2100 97.7 °F (36.5 °C) 67 17 134/69 -- -- -- -- SB    07/02/25 1930 -- -- -- -- -- 253 lb 4.9 oz (114.9 kg) -- -- SB    07/02/25 1700 -- -- -- 121/71 98 % -- -- -- EV    07/02/25 1645 -- -- -- 121/71 98 % -- -- -- EV    07/02/25 1630 -- -- -- 128/69 97 % -- -- -- EV    07/02/25 1615 -- -- -- 127/72 99 % -- -- -- EV    07/02/25 1600 -- -- -- 126/60 98 % -- -- -- EV    07/02/25 1545 -- -- -- 126/74 99 % -- -- -- EV    07/02/25 1530 -- -- -- 127/69 98 % -- -- -- EV    07/02/25 1515 -- -- -- 126/65 98 % -- -- -- EV    07/02/25 1500 -- -- -- 124/68 98 % -- -- -- EV    07/02/25 1445 -- -- -- 130/66 99 % -- -- -- EV    07/02/25 1430 -- -- -- 122/64 99 % -- -- -- EV    07/02/25 1415 -- -- -- 134/67 98 % -- -- -- EV    07/02/25 1245 -- 85 -- 117/72 -- -- -- -- EV    07/02/25 1230 -- 83 -- 114/55 -- -- -- -- EV    07/02/25 1215 -- 84 -- 143/77 -- -- -- -- EV    07/02/25 1200 -- 77 -- 136/80 -- -- -- -- EV    07/02/25 1145 -- 85 -- 149/89 -- -- -- -- EV    07/02/25 1120 -- 91 -- 134/76 -- -- -- -- EV    07/02/25 1115 -- 85 -- 143/79 -- -- -- -- EV    07/02/25 1100 -- 91 -- 142/87 -- -- -- -- EV          CIWA Scores (since admission)       None

## 2025-07-04 ENCOUNTER — NST DOCUMENTATION (OUTPATIENT)
Dept: OBGYN CLINIC | Facility: CLINIC | Age: 40
End: 2025-07-04

## 2025-07-04 LAB
BASOPHILS # BLD AUTO: 0.02 X10(3) UL (ref 0–0.2)
BASOPHILS NFR BLD AUTO: 0.2 %
DEPRECATED RDW RBC AUTO: 43.8 FL (ref 35.1–46.3)
EOSINOPHIL # BLD AUTO: 0.09 X10(3) UL (ref 0–0.7)
EOSINOPHIL NFR BLD AUTO: 0.8 %
ERYTHROCYTE [DISTWIDTH] IN BLOOD BY AUTOMATED COUNT: 13.2 % (ref 11–15)
HCT VFR BLD AUTO: 30.4 % (ref 35–48)
HGB BLD-MCNC: 10.1 G/DL (ref 12–16)
IMM GRANULOCYTES # BLD AUTO: 0.05 X10(3) UL (ref 0–1)
IMM GRANULOCYTES NFR BLD: 0.5 %
LYMPHOCYTES # BLD AUTO: 1.12 X10(3) UL (ref 1–4)
LYMPHOCYTES NFR BLD AUTO: 10.3 %
MCH RBC QN AUTO: 30.1 PG (ref 26–34)
MCHC RBC AUTO-ENTMCNC: 33.2 G/DL (ref 31–37)
MCV RBC AUTO: 90.5 FL (ref 80–100)
MONOCYTES # BLD AUTO: 0.98 X10(3) UL (ref 0.1–1)
MONOCYTES NFR BLD AUTO: 9 %
NEUTROPHILS # BLD AUTO: 8.59 X10 (3) UL (ref 1.5–7.7)
NEUTROPHILS # BLD AUTO: 8.59 X10(3) UL (ref 1.5–7.7)
NEUTROPHILS NFR BLD AUTO: 79.2 %
PLATELET # BLD AUTO: 171 10(3)UL (ref 150–450)
RBC # BLD AUTO: 3.36 X10(6)UL (ref 3.8–5.3)
WBC # BLD AUTO: 10.9 X10(3) UL (ref 4–11)

## 2025-07-04 NOTE — LACTATION NOTE
07/04/25 1520   Evaluation Type   Evaluation Type Inpatient   Problems identified   Problems Identified Other HX of low milk supply and previous child not latching well   Maternal history   Maternal history Caesarean section;Obesity;PIH;Anxiety   Breastfeeding goal   Breastfeeding goal To maintain breast milk feeding per patient goal   Maternal Assessment   Bilateral Breasts Wide spaced;Soft;Symmetrical   Bilateral Nipples Colostrum easily expressed;Everted   Right Nipple Colostrum easily expressed   Prior breastfeeding experience (comment below) Multip;Unsuccessful   Breastfeeding Assistance Pumping assistance provided with permission   Pain assessment   Location/Comment Denies   Treatment of Sore Nipples Deeper latch techniques;Expressed breast milk   Guidelines for use of:   Breast pump type Ameda Platinum;Hand Pump   Current use of pump: Patient was using the hand pump and asked to be set up with a double electric breast pump   Suggested use of pump Pump if infant is not latching to breast;Pump each time a supplement is offered;Pump 8-12X/24hr   Other (comment) Mother states that feeding has been going okay but she is still supplementing. Patient has been using the hand pump but wanted to be set up with the double electric breast pump. LC set up pump and reviewed pumping settings. All questions answered.

## 2025-07-04 NOTE — PROGRESS NOTES
[facility]  part of [facility]    OB/Gyne Postpartum Progress Note      Karen Espinosa Patient Status:  Inpatient    1985 MRN [id number]   Location [facility] 3SE Attending Sean Lagos, DO   Hosp Day # 2 PCP Luis Arevalo MD       Subjective     Good pain control. Tolerating present diet. Ambulating well. Voiding freely.  She denies headache, fever, chest pain, shortness of breath, dizziness upon ambulation nor leg pain.     Objective   Vital signs in last 24 hours:  Temp:  [98.2 °F (36.8 °C)-98.9 °F (37.2 °C)] 98.2 °F (36.8 °C)  Pulse:  [79-92] 92  Resp:  [15-16] 16  BP: (124-135)/(71-82) 135/80    Input/Output:    Intake/Output Summary (Last 24 hours) at 2025 1212  Last data filed at 2025 0400  Gross per 24 hour   Intake --   Output 1275 ml   Net -1275 ml       AVSS  Constitutional: comfortable  Abdomen: soft non tender, incision clean dry and intact  Uterus: fundus below umbilicus, appropriately tender  Extremities: No calf tenderness, SCDs on while in bed, No pitting edema.      Results:   Labs / Path / Radiology:    Recent Results (from the past 24 hours)   Rh Immune Globulin (Product Only) Once    Collection Time: 25  1:15 AM   Result Value Ref Range    DERIVATIVE CODE RHG     DERIVATIVE LOT [id number]     UNIT ABO      Product Status Presumed Transfused    CBC With Differential With Platelet    Collection Time: 25  6:18 AM   Result Value Ref Range    WBC 10.9 4.0 - 11.0 x10(3) uL    RBC 3.36 (L) 3.80 - 5.30 x10(6)uL    HGB 10.1 (L) 12.0 - 16.0 g/dL    HCT 30.4 (L) 35.0 - 48.0 %    MCV 90.5 80.0 - 100.0 fL    MCH 30.1 26.0 - 34.0 pg    MCHC 33.2 31.0 - 37.0 g/dL    RDW-SD 43.8 35.1 - 46.3 fL    RDW 13.2 11.0 - 15.0 %    .0 150.0 - 450.0 10(3)uL    Neutrophil Absolute Prelim 8.59 (H) 1.50 - 7.70 x10 (3) uL    Neutrophil Absolute 8.59 (H) 1.50 - 7.70 x10(3) uL    Lymphocyte Absolute 1.12 1.00 - 4.00 x10(3) uL    Monocyte Absolute 0.98 0.10  - 1.00 x10(3) uL    Eosinophil Absolute 0.09 0.00 - 0.70 x10(3) uL    Basophil Absolute 0.02 0.00 - 0.20 x10(3) uL    Immature Granulocyte Absolute 0.05 0.00 - 1.00 x10(3) uL    Neutrophil % 79.2 %    Lymphocyte % 10.3 %    Monocyte % 9.0 %    Eosinophil % 0.8 %    Basophil % 0.2 %    Immature Granulocyte % 0.5 %       Specimens (From admission, onward)      None            No results found.      Assessment/Plan   39 year oldyo  , s/p  without labor, POD# 1     Patient Active Problem List   Diagnosis    BMI 40.6    Frequent UTI    Rh negative state in antepartum period ([facility])    Multigravida of advanced maternal age in first trimester ([facility])    History of  delivery    History of pre-eclampsia    Family history of Downs syndrome    Maternal morbid obesity in second trimester, antepartum ([facility]) [O99.212, E66.01]    Death of family member    Positive GBS test    Breech presentation ([facility])    Pregnancy ([facility])    Gestational hypertension, third trimester ([facility])   .    ambulate, continue routine postpartum care      The patient had a male infant, and does not desire circumcision.        Gail Stapleton MD  2025  12:12 PM

## 2025-07-04 NOTE — NST
Nonstress Test   Patient: Karen Espinosa    Gestation:     Diagnosis from order: Obesity in pregnancy ([facility])    Problem List:   Patient Active Problem List   Diagnosis    BMI 40.6    Frequent UTI    Rh negative state in antepartum period ([facility])    Multigravida of advanced maternal age in first trimester ([facility])    History of  delivery    History of pre-eclampsia    Family history of Downs syndrome    Maternal morbid obesity in second trimester, antepartum ([facility]) [O99.212, E66.01]    Death of family member    Positive GBS test    Breech presentation ([facility])    Pregnancy ([facility])    Gestational hypertension, third trimester ([facility])       NST:        6/3/2025   NST DOCUMENTATION   Nonstress Test Second Interpretation Reactive         I agree with the above evaluation. NST completed.  Gail Stapleton MD  2025  6:26 PM

## 2025-07-05 ENCOUNTER — TELEPHONE (OUTPATIENT)
Dept: OBGYN CLINIC | Facility: CLINIC | Age: 40
End: 2025-07-05

## 2025-07-05 RX ORDER — NIFEDIPINE 30 MG/1
30 TABLET, EXTENDED RELEASE ORAL DAILY
Status: DISCONTINUED | OUTPATIENT
Start: 2025-07-05 | End: 2025-07-06

## 2025-07-05 RX ORDER — MORPHINE SULFATE 4 MG/ML
4 INJECTION, SOLUTION INTRAMUSCULAR; INTRAVENOUS EVERY 10 MIN PRN
Status: DISCONTINUED | OUTPATIENT
Start: 2025-07-05 | End: 2025-07-06

## 2025-07-05 RX ORDER — MORPHINE SULFATE 2 MG/ML
2 INJECTION, SOLUTION INTRAMUSCULAR; INTRAVENOUS EVERY 10 MIN PRN
Status: DISCONTINUED | OUTPATIENT
Start: 2025-07-05 | End: 2025-07-06

## 2025-07-05 RX ORDER — HYDROMORPHONE HYDROCHLORIDE 1 MG/ML
0.6 INJECTION, SOLUTION INTRAMUSCULAR; INTRAVENOUS; SUBCUTANEOUS EVERY 5 MIN PRN
Status: DISCONTINUED | OUTPATIENT
Start: 2025-07-05 | End: 2025-07-06

## 2025-07-05 RX ORDER — HYDROMORPHONE HYDROCHLORIDE 1 MG/ML
0.4 INJECTION, SOLUTION INTRAMUSCULAR; INTRAVENOUS; SUBCUTANEOUS EVERY 5 MIN PRN
Status: DISCONTINUED | OUTPATIENT
Start: 2025-07-05 | End: 2025-07-06

## 2025-07-05 RX ORDER — HYDROMORPHONE HYDROCHLORIDE 1 MG/ML
0.2 INJECTION, SOLUTION INTRAMUSCULAR; INTRAVENOUS; SUBCUTANEOUS EVERY 5 MIN PRN
Status: DISCONTINUED | OUTPATIENT
Start: 2025-07-05 | End: 2025-07-06

## 2025-07-05 RX ORDER — NALOXONE HYDROCHLORIDE 0.4 MG/ML
80 INJECTION, SOLUTION INTRAMUSCULAR; INTRAVENOUS; SUBCUTANEOUS AS NEEDED
Status: ACTIVE | OUTPATIENT
Start: 2025-07-05 | End: 2025-07-05

## 2025-07-05 RX ORDER — SODIUM CHLORIDE, SODIUM LACTATE, POTASSIUM CHLORIDE, CALCIUM CHLORIDE 600; 310; 30; 20 MG/100ML; MG/100ML; MG/100ML; MG/100ML
INJECTION, SOLUTION INTRAVENOUS CONTINUOUS
Status: DISCONTINUED | OUTPATIENT
Start: 2025-07-05 | End: 2025-07-06

## 2025-07-05 RX ORDER — MORPHINE SULFATE 10 MG/ML
6 INJECTION, SOLUTION INTRAMUSCULAR; INTRAVENOUS EVERY 10 MIN PRN
Status: DISCONTINUED | OUTPATIENT
Start: 2025-07-05 | End: 2025-07-06

## 2025-07-05 NOTE — TELEPHONE ENCOUNTER
Patient needs BP check and staple removal next Saturday  Patient with gestational hypertension home on nifedipine 30mg XL daily  Keep BP log and bring to appointment

## 2025-07-05 NOTE — DISCHARGE INSTRUCTIONS
Please purchase over the counter motrin and tylenol for pain  Ibuprofen 600 mg given at 1300 - can have every 6 hours for pain  Acetaminophen 1000mg given at 1000 - can have every 6 hours for pain  Please purchase over the counter colace for constipation  100 mg given at 0600 today - take twice a day for stool softening    Monitor blood pressures twice per day - alert MD for symptoms of hypertension and/or numbers greater than 160/110   Bring log to appointment on   Discharge Instructions for  Section ()  You had a  section, or . During the , your baby was delivered through a surgical incision in your stomach and uterus. Full recovery after a  can take time. It’s important to take care of yourself -- for your own sake and because your new baby needs you. Here are some guidelines to follow at home.  Incision care  Here's how to take care of your incision:  Shower as needed. Pat your incision dry.  Watch your incision for signs of infection, like increasing redness or drainage.  Hold a pillow against the incision when you laugh or cough and when you get up from a lying or sitting position.  Remember, it can take as long as 6 weeks for a  incision to heal.  Activity  Here are some suggestions:  Don’t try to take care of anyone other than your baby and yourself.  Remember, the more active you are, the more likely you are to have an increase in your bleeding.  Get lots of rest. Take naps in the afternoon.  Increase your activities gradually.  Plan your activities so that you don’t have to go up or down stairs more than necessary.  Do postsurgical deep breathing and coughing exercises. Ask your healthcare provider for instructions.  Don’t lift anything heavier than your baby until your healthcare provider tells you it’s OK.  Don’t drive until your healthcare provider says it’s OK.  Don’t have sexual intercourse until after you’ve had a follow-up  appointment with your healthcare provider and you have decided on a birth control method.  Follow-up  Make a follow-up appointment as directed by our staff.  When to call your healthcare provider  Call your healthcare provider right away if you have any of the following:  Fever of 100.4°F (38°C) or higher  Redness, pain, or drainage at your incision site  Bleeding that requires a new sanitary pad every hour  Severe pain in the abdomen  Pain or urgency with urination  Foul odor from vaginal discharge  Trouble urinating or emptying your bladder  No bowel movement within 1 week after the birth of your baby  Swollen, red, painful area in the leg  Appearance of rash or hives  Sore, red, painful area on the breasts that may be accompanied by flu-like symptoms  Feelings of anxiety, panic, and/or depression   Date Last Reviewed: 8/16/2015  © 1037-3104 [employer]. [address] [place] [id number]. All rights reserved. This information is not intended as a substitute for professional medical care. Always follow your healthcare professional's instructions.

## 2025-07-05 NOTE — LACTATION NOTE
07/05/25 0810   Evaluation Type   Evaluation Type Inpatient   Problems identified   Problems identified Knowledge deficit;Milk supply not WNL   Milk supply not WNL Reduced (potential)   Problems Identified Other HX of low milk supply and previous child not latching well. giving large amounts of supplementation   Maternal history   Maternal history Caesarean section;Obesity;PIH;Anxiety   Breastfeeding goal   Breastfeeding goal To maintain breast milk feeding per patient goal   Maternal Assessment   Bilateral Breasts Wide spaced;Soft;Symmetrical   Bilateral Nipples Colostrum easily expressed;Everted   Prior breastfeeding experience (comment below) Multip;Unsuccessful   Breastfeeding Assistance LC assistance declined at this time   Guidelines for use of:   Current use of pump: pumping   Suggested use of pump Pump if infant is not latching to breast;Pump each time a supplement is offered;Pump 8-12X/24hr   Other (comment) LC assistance offered. Mother states that she has been pumping and getting some colostrum and providing that to the infant. LC reviewed s/s of engorgement and mastitis. LC also educated on outpatient information. All questions answered.

## 2025-07-05 NOTE — PROGRESS NOTES
[facility]  part of Merg[facility] with [facility]    OB/Gyne Post  Section Progress Note      Karen Espinosa Patient Status:  Inpatient    1985 MRN [id number]   Location [facility] 3SE Attending Sean Lagos, DO   Hosp Day # 3 PCP Luis Arevalo MD       Subjective     Good pain control. Tolerating present diet. Ambulating well. Voiding freely. Lochia light.  Flatus: present and having BMs    BP elevated x 2 this afternoon but had just been talking about her mom whom had recently passed away prior to BP check    She denies fevers, chills, headache, blurry vision, chest pain, SOB, RUQ pain, n/v, dizziness upon ambulation nor leg pain.     Objective   Vital signs in last 24 hours:  Temp:  [97.8 °F (36.6 °C)-98 °F (36.7 °C)] 97.8 °F (36.6 °C)  Pulse:  [71-90] 85  Resp:  [16] 16  BP: (130-152)/(66-91) 146/84    Input/Output:  No intake or output data in the 24 hours ending 25 1429    Constitutional: comfortable  Abdomen: soft nontender  Uterus: fundus below umbilicus, non tender  Wound/Incision:  Clean / dry / intact with staples  Extremities: No calf tenderness, SCDs on while in bed, neg homans      Results:   Labs / Path / Radiology:    No results found for this or any previous visit (from the past 24 hours).      No results found.      Assessment/Plan   POD# 2 with following issues:   Patient Active Problem List   Diagnosis    BMI 40.6    Frequent UTI    Rh negative state in antepartum period ([facility])    Multigravida of advanced maternal age in first trimester ([facility])    History of  delivery    History of pre-eclampsia    Family history of Downs syndrome    Maternal morbid obesity in second trimester, antepartum ([facility]) [O99.212, E66.01]    Death of family member    Positive GBS test    Breech presentation ([facility])    Pregnancy ([facility])    Gestational hypertension, third trimester ([facility])   .    Patient feeling well and eager to go home  BP elevated x 2 recently but was stated was  [facility] Progress Note    Patient: Valentin Artis Date of Service: 11/29/2022   YOB: 1957 Admission Date: 11/21/2022   MRN: [id number] Attending: Td Ramirez MD     ICU admit date: 11/21  Chief Complaint:     HPI: Valentin Clemente an 65 year old past medical history significant for COPD and right hip fracture s/p surgery in 2009 who presents to the ED for evaluation of shortness of breath.  This is similar to Pt's past COPD exacerbations and has been worsening for the past week.  Patient reports that he does have an albuterol inhaler at home, but has not had significant relief with its use. In the ED he received 1hr long nebulizer treatment. After that a repeat gas was done showing worsening hypercarbia and he was placed on bipap icu was consulted. Influenza swab is positive in ED and was started on tamiflu. Also started on emperic antibiotics with cefepime, flagyl, vancomycin. Antibiotics were changed to ceftriaxone and azithromycin. Procal mildly elevated, leucocytosis with bandemia present. CXR was normal. Started on scheduled duonebs, given solumedrol 125 mg followed by 40 IV.  IV steroids switched to PO. Patient was weaned off of BIPAP to high flow oxygen. Patient was medically stable for transfer to floors. Started on flomax for urinary retention.    24 hour events/Subjective:  Patient repots his dyspnea has improved. He complains of chronic right chest pain, due to his rib fracture a few weeks ago. Continues to have productive cough. Wheezing has resolved.    Active problems:  --Acute on chronic exacerbation of COPD  --Acute hypercapnic/hypoxic respiratory failure  --Influenza A syndrome  --Viral bronchitis  --Hx of moderate to severe emphysema  --Tobacco abuse  --Urinary retention    ASSESSMENT/PLAN:    Neuro: A&Ox4.   --no acute concerns    CV: Vitals stable.   --Will give one dose IV lasix 40 yesterday, no crackles today    Pulmonary: 3L oxymask. Wheezing resolved, clear  lungs on exam. COPD exacerbation with superimposed Influenza A pneumonia.CXR 11/26 showed likely bronchitis. Procal was mildly elevated with bandemia on admission. MRSA, strept penumo, legionella, sputum cultures negative.   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    ID: Afebrile. No white count. Mild leucocytosis with bandemia resolved,  procal 1.12. Strept pneumo, legionella, MRSA negative. Blood cultures and sputum cultures were negative.  --Completed zithromax, continue ceftriaxone 11/28  --Completed tamiflu for influenza    Heme: No acute concerns.    Endocrine: No acute concerns.    GI: No acute concerns.  --Regular diet    Urology: Urinary retention, likely due to multiple antimuscarinic meds, so WA'ed scheduled duonebs and made it PRN.   --Foleys placed 11/24-11/28, voiding well now  --Continue flomax    Prophylaxis:  --Lovenox    Lines/Drains:  --PIV x2    Diet:  --Regular diet    Social Determinants: Homeless, [facility]  --SW consulted  --[facility] respite closed until December 1st, so may try to go there. Referral to department of aging sent. Otherwise patient plans to return to [facility] without oxygen.    Goals/Disposition: Continue to wean oxygen as tolerated. Patient is medically stable for transfer to floors. Sign out was given to Olamide Hewitt. Patient was accepted to Dr. Villa.    ================================================================    I/O last 3 completed shifts:  In: 325 [Other:325]  Out: 1695 [Urine:1695]  I/O this shift:  In: -   Out: 800 [Urine:800]    Vital Last Value 24 Hour Range   Temperature 98.2 °F  talking with her family about her mother whom recently passed; will recheck BP again and if stable okay for home  Plan for BP check and staple removal in 1 week in office  Asked that she keep log, call with severe range BPs or symptoms        Sean Lagos, DO  7/5/2025  2:29 PM       (36.8 °C) (11/29/22 0130) Temp  Min: 97.2 °F (36.2 °C)  Max: 98.2 °F (36.8 °C)   Pulse 67 (11/29/22 0130) Pulse  Min: 67  Max: 102   Respiratory 18 (11/29/22 0130) Resp  Min: 17  Max: 41   Non-Invasive  Blood Pressure 111/72 (11/29/22 0130) BP  Min: 108/73  Max: 127/85   Pulse Oximetry 94 % (11/29/22 0130) SpO2  Min: 88 %  Max: 94 %   Arterial   Blood Pressure   No data recorded     VENT SETTINGS       LAB RESULTS  Recent Labs   Lab 11/27/22  0427 11/26/22  0411   WBC 6.8 5.6   HCT 49.3 47.5   HGB 15.2 14.3    193     Recent Labs   Lab 11/29/22  0450 11/28/22  0448   SODIUM 137 136   POTASSIUM 4.1 4.2   CHLORIDE 97 97   CO2 36* 36*   GLUCOSE 94 87   BUN 27* 22*   CREATININE 0.61* 0.67       IMAGING  XR Chest AP or PA    Result Date: 11/22/2022  Narrative: XR CHEST AP OR PA HISTORY: Shortness of breath, chest pain, and productive cough. COMPARISON: Prior chest radiograph, most recently 10/17/2022. CT chest abdomen pelvis with contrast 10/17/2022. TECHNIQUE:  Single, AP upright view of the chest. FINDINGS: The cardiomediastinal contour and pulmonary vasculature are within normal limits. No focal consolidation. No definite pleural effusion. No pneumothorax. The known right lateral seventh rib fracture is not well seen on current radiograph.     Impression: IMPRESSION: No acute cardiopulmonary findings. I, Attending Radiologist Alyssa Murray MD, have reviewed the images and report and concur with these findings interpreted by Resident Radiologist, Alvaro Hernandez MD.     XR CHEST PA OR AP 1 VIEW    Result Date: 11/12/2022  Narrative: PROCEDURE: CHEST 1 VIEW TECHNIQUE: Chest radiograph posteroanterior projection. CPT 32603 HISTORY: cough /copd COMPARISON: 11/9/2022. AP view limits evaluation of the cardiac silhouette. There is no pulmonary vascular congestion. No infiltrate or consolidation.    Impression: IMPRESSION: No infiltrate. [id number] Electronically Signed By Aston Mendoza on 2022-11-12 11:14  CTZ If you are a healthcare provider with questions about this report and would like to talk to a radiologist, please call [phone].    XR CHEST PA OR AP 1 VIEW    Result Date: 11/9/2022  Narrative: PROCEDURE: CHEST 1 VIEW TECHNIQUE: Chest radiograph posteroanterior projection. CPT 75911 HISTORY: Shortness of breath. COMPARISONS: Chest radiograph 11/5/2022. FINDINGS:    Impression: The mediastinal and cardiac silhouette are normal in appearance. Similar coarsened interstitial parenchymal markings which may be accentuated by portable technique. Similar bilateral infrahilar reticular airspace opacities. No appreciable pleural effusions or pneumothorax. IMPRESSION: IMPRESSION: Stable chest radiograph with bilateral infrahilar reticular airspace opacities. [id number] Electronically Signed By Zelalem Perez DO on 2022-11-09 16:28 CTZ If you are a healthcare provider with questions about this report and would like to talk to a radiologist, please call [phone].    XR CHEST PA OR AP 1 VIEW    Result Date: 11/5/2022  Narrative: PROCEDURE: PORTABLE CHEST TECHNIQUE: A portable AP chest radiograph was obtained at 11/5/2022 12:11 PM. CPT 93927 HISTORY: 65-year-old male, follow-up pneumonia. COMPARISONS: CTA chest from 11/3/2022. FINDINGS:   Grossly unchanged subtle left perihilar interstitial haziness. There is no evidence of pneumothorax. The costophrenic angles appear sharp without obvious pleural effusions. The cardiomediastinal silhouette is within normal limits. No acute osseous abnormalities are identified.    Impression: IMPRESSION: Grossly unchanged subtle left perihilar interstitial haziness in keeping with reported underlying infiltrate. [id number] Electronically Signed By Hira Smith on 2022-11-05 12:19 CTZ If you are a healthcare provider with questions about this report and would like to talk to a radiologist, please call [phone].    XR CHEST PA OR AP 1 VIEW    Result Date: 11/3/2022  Narrative: PROCEDURE:  PORTABLE CHEST TECHNIQUE: A portable AP chest radiograph was obtained at 11/3/2022 1:22 PM. CPT 90627 HISTORY: SOB COMPARISONS: X-ray from December 28, 2021 and CT scan from December 11, 2020. FINDINGS:   Heart: Normal. Mediastinum/Vessels: Prominence of vessels/soft tissue in the hilar regions is similar appearance compared to exam from December 28, 2021 but appears more pronounced than on exam from March 2, 2020. Lungs/Pleural space: No pleural effusion, pneumothorax, or dense consolidation with air bronchograms. Bony thorax: No acute osseous abnormality. Life support devices: None.    Impression: IMPRESSION: Prominence of vessels and/or soft tissue in the hilar regions as detailed above, nonspecific, could reflect central congestion or possibly an inflammatory or infectious process. Follow-up as clinically warranted. ws:[id number]   Electronically Signed By Chalo Garcia on 2022-11-03 13:36 CTZ If you are a healthcare provider with questions about this report and would like to talk to a radiologist, please call [phone].    CT Angio Chest PE W Contrast    Result Date: 11/3/2022  Narrative: PROCEDURE: CT ANGIOGRAM OF THE CHEST FOR PULMONARY EMBOLISM TECHNIQUE: Computerized axial tomographic angiography of the chest and pulmonary arteries was performed after the IV injection of iodinated nonionic contrast. The image data was postprocessed using 2-dimensional multiplanar reformatted (MPR and/or MIP) and 3-dimensional (MIP and/or volume rendered). The examination is specifically tailored to the evaluation of the pulmonary arteries per clinical request.  Automated exposure control, adjustment of mA and/or kV according to patient size, or iterative reconstruction dose optimization techniques were utilized. CPT , 89435 HISTORY: Shortness of breath R06.02, chest pain unspecified R07.9 , SOB, hypoxia, tachycardia COMPARISONS: Chest radiograph earlier the same day. CT pulmonary angiogram December 11, 2020.  FINDINGS: Heart and pericardium: Normal. RV:LV ratio less than 1. Thoracic aorta: Normal. Pulmonary vasculature: Normal. No pulmonary emboli. Lymph nodes: No enlarged thoracic lymph nodes. Lungs: Centrilobular emphysematous changes worse in the lung apices. Groundglass opacity in the superior aspect of the LEFT lower lobe. Bibasilar scarring versus atelectasis. Pleural space: No effusion, thickening, or pneumothorax. Musculoskeletal structures: Acute right-sided seventh and ninth lateral rib fractures with minimal displacement. Upper abdominal structures: No significant abnormality.    Impression: IMPRESSION: 1. No acute pulmonary embolism. 2. Minimal groundglass opacity superior aspect LEFT lower lobe. May represent infectious or inflammatory etiology. 3. Acute RIGHT sided seventh and ninth rib fractures. 4. Emphysematous changes. [id number] Electronically Signed By Gentry Figueroa on 2022-11-03 15:05 CTZ If you are a healthcare provider with questions about this report and would like to talk to a radiologist, please call [phone].       PHYSICAL EXAM  General:  NAD,  HEENT:  PERRL, no conjunctival pallor or scleral icterus, MMM  CV:  RRR, no m/r/g, S1/S2 present  Resp:  Clear lungs heard bilaterally  Abd:  Soft, NT/ND, +BS throughout  Ext:  No B/L LE edema  Skin:  Nml temp and moisture to touch  Neuro:  A/O x3, strength and sensation intact throughout    Best Practices  Sedation:   N/A  Analgesia:   N/A  SBT:    N/A  Head of Bed:   30 degrees  DVT Proph.:   SCDs (sequential compression devices) and  LMWH (low moelcular weight heparin)  Nutrition:   oral intake  Glycemic Control:  none  Line Necessity:  PIV  Ulcer proph:   Not indicated    Pertinent Reviewed:  Allergies, Medications, Labs, Imaging and Physician and Nursing Notes     Assessment and plan discussed with Jeremy F Siegrist, MD, who agrees with the above. Please see addendum for additional information.     Ai Carpenter  PGY-II    11/29/2022 6:58  AM  Pager [phone], [phone]      [facility]:  Pt seen and examined with Dr. Carpenter.  I have edited her note above to reflect our joint assessment and plan.      Current active hospital diagnoses:  Influenza A  COPD with acute exacerbation  R-sided chest pain due to rib fracture    Last 24 hour events:  - No acute events overnight    Exam today notable for:  No wheezing, breathing comfortably on 3 LPM oxygen  Heart regular  Extremities warm and well-perfused    Summary of daily plan:  - Continue to wean oxygen as tolerated  - Changing Anoro to Trelegy to include LAMA, continue duonebs  - Steroid taper  - Social work eval ongoing for placement / homelessness - difficult with his ongoing oxygen needs  - Has had transfer orders x 6 days    Level of service today: [id number]    Jeremy F Siegrist, MD

## 2025-07-05 NOTE — PLAN OF CARE
Problem: Patient Centered Care  Goal: Patient preferences are identified and integrated in the patient's plan of care  Description: Interventions:  - What would you like us to know as we care for you? Second baby boy  - Provide timely, complete, and accurate information to patient/family  - Incorporate patient and family knowledge, values, beliefs, and cultural backgrounds into the planning and delivery of care  - Encourage patient/family to participate in care and decision-making at the level they choose  - Honor patient and family perspectives and choices  Outcome: Adequate for Discharge     Problem: Patient/Family Goals  Goal: Patient/Family Long Term Goal  Description: Patient's Long Term Goal: to go home with baby    Interventions:  - See additional Care Plan goals for specific interventions  Outcome: Adequate for Discharge  Goal: Patient/Family Short Term Goal  Description: Patient's Short Term Goal: discharge home    Interventions:   - See additional Care Plan goals for specific interventions  Outcome: Adequate for Discharge     Problem: POSTPARTUM  Goal: Long Term Goal:Experiences normal postpartum course  Description: INTERVENTIONS:  - Assess and monitor vital signs and lab values.  - Assess fundus and lochia.  - Provide ice/sitz baths for perineum discomfort.  - Monitor healing of incision/episiotomy/laceration, and assess for signs and symptoms of infection and hematoma.  - Assess bladder function and monitor for bladder distention.  - Provide/instruct/assist with pericare as needed.  - Provide VTE prophylaxis as needed.  - Monitor bowel function.  - Encourage ambulation and provide assistance as needed.  - Assess and monitor emotional status and provide social service/psych resources as needed.  - Utilize standard precautions and use personal protective equipment as indicated. Ensure aseptic care of all intravenous lines and invasive tubes/drains.  - Obtain immunization and exposure to communicable  diseases history.  Outcome: Adequate for Discharge  Goal: Optimize infant feeding at the breast  Description: INTERVENTIONS:  - Initiate breast feeding within first hour after birth.   - Monitor effectiveness of current breast feeding efforts.  - Assess support systems available to mother/family.  - Identify cultural beliefs/practices regarding lactation, letdown techniques, maternal food preferences.  - Assess mother's knowledge and previous experience with breast feeding.  - Provide information as needed about early infant feeding cues (e.g., rooting, lip smacking, sucking fingers/hand) versus late cue of crying.  - Discuss/demonstrate breast feeding aids (e.g., infant sling, nursing footstool/pillows, and breast pumps).  - Encourage mother/other family members to express feelings/concerns, and actively listen.  - Educate father/SO about benefits of breast feeding and how to manage common lactation challenges.  - Recommend avoidance of specific medications or substances incompatible with breast feeding.  - Assess and monitor for signs of nipple pain/trauma.  - Instruct and provide assistance with proper latch.  - Review techniques for milk expression (breast pumping) and storage of breast milk. Provide pumping equipment/supplies, instructions and assistance, as needed.  - Encourage rooming-in and breast feeding on demand.  - Encourage skin-to-skin contact.  - Provide LC support as needed.  - Assess for and manage engorgement.  - Provide breast feeding education handouts and information on community breast feeding support.   Outcome: Adequate for Discharge  Goal: Establishment of adequate milk supply with medication/procedure interruptions  Description: INTERVENTIONS:  - Review techniques for milk expression (breast pumping).   - Provide pumping equipment/supplies, instructions, and assistance until it is safe to breastfeed infant.  Outcome: Adequate for Discharge  Goal: Experiences normal breast weaning  course  Description: INTERVENTIONS:  - Assess for and manage engorgement.  - Instruct on breast care.  - Provide comfort measures.  Outcome: Adequate for Discharge  Goal: Appropriate maternal -  bonding  Description: INTERVENTIONS:  - Assess caregiver- interactions.  - Assess caregiver's emotional status and coping mechanisms.  - Encourage caregiver to participate in  daily care.  - Assess support systems available to mother/family.  - Provide /case management support as needed.  Outcome: Adequate for Discharge     Problem: GASTROINTESTINAL - ADULT  Goal: Minimal or absence of nausea and vomiting  Description: INTERVENTIONS:  - Maintain adequate hydration with IV or PO as ordered and tolerated  - Nasogastric tube to low intermittent suction as ordered  - Evaluate effectiveness of ordered antiemetic medications  - Provide nonpharmacologic comfort measures as appropriate  - Advance diet as tolerated, if ordered  - Obtain nutritional consult as needed  - Evaluate fluid balance  Outcome: Completed  Goal: Maintains or returns to baseline bowel function  Description: INTERVENTIONS:  - Assess bowel function  - Maintain adequate hydration with IV or PO as ordered and tolerated  - Evaluate effectiveness of GI medications  - Encourage mobilization and activity  - Obtain nutritional consult as needed  - Establish a toileting routine/schedule  - Consider collaborating with pharmacy to review patient's medication profile  Outcome: Completed  Goal: Maintains adequate nutritional intake (undernourished)  Description: INTERVENTIONS:  - Monitor percentage of each meal consumed  - Identify factors contributing to decreased intake, treat as appropriate  - Assist with meals as needed  - Monitor I&O, WT and lab values  - Obtain nutritional consult as needed  - Optimize oral hygiene and moisture  - Encourage food from home; allow for food preferences  - Enhance eating environment  Outcome:  Completed  Goal: Achieves appropriate nutritional intake (bariatric)  Description: INTERVENTIONS:  - Monitor for over-consumption  - Identify factors contributing to increased intake, treat as appropriate  - Monitor I&O, WT and lab values  - Obtain nutritional consult as needed  - Evaluate psychosocial factors contributing to over-consumption  Outcome: Completed     Problem: GENITOURINARY - ADULT  Goal: Absence of urinary retention  Description: INTERVENTIONS:  - Assess patient’s ability to void and empty bladder  - Monitor intake/output and perform bladder scan as needed  - Follow urinary retention protocol/standard of care  - Consider collaborating with pharmacy to review patient's medication profile  - Implement strategies to promote bladder emptying  Outcome: Completed   Up ad teresa. Pain managed with current med regime of alternating ibuprofen and acetaminophen. Requesting discharge home today.

## 2025-07-06 VITALS
SYSTOLIC BLOOD PRESSURE: 145 MMHG | BODY MASS INDEX: 46 KG/M2 | HEART RATE: 82 BPM | RESPIRATION RATE: 16 BRPM | DIASTOLIC BLOOD PRESSURE: 81 MMHG | WEIGHT: 253.31 LBS | OXYGEN SATURATION: 97 % | TEMPERATURE: 98 F

## 2025-07-06 RX ORDER — NIFEDIPINE 30 MG
30 TABLET, EXTENDED RELEASE ORAL DAILY
Qty: 10 TABLET | Refills: 0 | Status: SHIPPED | OUTPATIENT
Start: 2025-07-06

## 2025-07-06 NOTE — PLAN OF CARE
Problem: POSTPARTUM  Goal: Long Term Goal:Experiences normal postpartum course  Description: INTERVENTIONS:  - Assess and monitor vital signs and lab values.  - Assess fundus and lochia.  - Provide ice/sitz baths for perineum discomfort.  - Monitor healing of incision/episiotomy/laceration, and assess for signs and symptoms of infection and hematoma.  - Assess bladder function and monitor for bladder distention.  - Provide/instruct/assist with pericare as needed.  - Provide VTE prophylaxis as needed.  - Monitor bowel function.  - Encourage ambulation and provide assistance as needed.  - Assess and monitor emotional status and provide social service/psych resources as needed.  - Utilize standard precautions and use personal protective equipment as indicated. Ensure aseptic care of all intravenous lines and invasive tubes/drains.  - Obtain immunization and exposure to communicable diseases history.  Outcome: Adequate for Discharge  Goal: Optimize infant feeding at the breast  Description: INTERVENTIONS:  - Initiate breast feeding within first hour after birth.   - Monitor effectiveness of current breast feeding efforts.  - Assess support systems available to mother/family.  - Identify cultural beliefs/practices regarding lactation, letdown techniques, maternal food preferences.  - Assess mother's knowledge and previous experience with breast feeding.  - Provide information as needed about early infant feeding cues (e.g., rooting, lip smacking, sucking fingers/hand) versus late cue of crying.  - Discuss/demonstrate breast feeding aids (e.g., infant sling, nursing footstool/pillows, and breast pumps).  - Encourage mother/other family members to express feelings/concerns, and actively listen.  - Educate father/SO about benefits of breast feeding and how to manage common lactation challenges.  - Recommend avoidance of specific medications or substances incompatible with breast feeding.  - Assess and monitor for signs of  nipple pain/trauma.  - Instruct and provide assistance with proper latch.  - Review techniques for milk expression (breast pumping) and storage of breast milk. Provide pumping equipment/supplies, instructions and assistance, as needed.  - Encourage rooming-in and breast feeding on demand.  - Encourage skin-to-skin contact.  - Provide LC support as needed.  - Assess for and manage engorgement.  - Provide breast feeding education handouts and information on community breast feeding support.   Outcome: Adequate for Discharge  Goal: Establishment of adequate milk supply with medication/procedure interruptions  Description: INTERVENTIONS:  - Review techniques for milk expression (breast pumping).   - Provide pumping equipment/supplies, instructions, and assistance until it is safe to breastfeed infant.  Outcome: Adequate for Discharge  Goal: Experiences normal breast weaning course  Description: INTERVENTIONS:  - Assess for and manage engorgement.  - Instruct on breast care.  - Provide comfort measures.  Outcome: Adequate for Discharge  Goal: Appropriate maternal -  bonding  Description: INTERVENTIONS:  - Assess caregiver- interactions.  - Assess caregiver's emotional status and coping mechanisms.  - Encourage caregiver to participate in  daily care.  - Assess support systems available to mother/family.  - Provide /case management support as needed.  Outcome: Adequate for Discharge   Up ad teresa. Discharge order received. Managing all self cares and cares of baby independently. Pain managed with ibuprofen and acetaminophen alternating.   Follow up advised

## 2025-07-06 NOTE — PROGRESS NOTES
[facility]  part of [facility]    OB/Gyne Post  Section Progress Note      Karen Espinosa Patient Status:  Inpatient    1985 MRN [id number]   Location [facility] 3SE Attending Sean Lagos, DO   Hosp Day # 4 PCP Luis Arevalo MD       Subjective     Good pain control. Tolerating present diet. Ambulating well. Voiding freely. Lochia light.  Flatus: present and having BMs.     Wants to go home    She denies fevers, chills, headache, blurry vision, chest pain, SOB, RUQ pain, n/v, dizziness upon ambulation nor leg pain.     Objective   Vital signs in last 24 hours:  Temp:  [97.9 °F (36.6 °C)-98.2 °F (36.8 °C)] 97.9 °F (36.6 °C)  Pulse:  [73-87] 82  Resp:  [16] 16  BP: (123-147)/(67-87) 145/81    Input/Output:  No intake or output data in the 24 hours ending 25 1253    Constitutional: comfortable  Abdomen: soft nontender  Uterus: fundus below umbilicus, non tender  Wound/Incision:  Clean / dry / intact with staples  Extremities: No calf tenderness, SCDs on while in bed, neg homans      Results:   Labs / Path / Radiology:    No results found for this or any previous visit (from the past 24 hours).      No results found.      Assessment/Plan   POD# 3 with following issues:   Patient Active Problem List   Diagnosis    BMI 40.6    Frequent UTI    Rh negative state in antepartum period ([facility])    Multigravida of advanced maternal age in first trimester ([facility])    History of  delivery    History of pre-eclampsia    Family history of Downs syndrome    Maternal morbid obesity in second trimester, antepartum ([facility]) [O99.212, E66.01]    Death of family member    Positive GBS test    Breech presentation ([facility])    Pregnancy ([facility])    Gestational hypertension, third trimester ([facility])   .  BP stable s/p starting Nifedipine 30mg XL daily   discharge home with discharge instructions reviewed in detail  Keep BP log and check BID; call with severe range BPs and/or symptoms d/w  patient  Nifedipine to pharmacy.  Staple removal and BP check in 6 days in the office.  My staff will coordinate          Sean Lagos DO  7/6/2025  12:53 PM

## 2025-07-06 NOTE — LACTATION NOTE
07/06/25 0815   Evaluation Type   Evaluation Type Inpatient   Problems identified   Problems identified Knowledge deficit;Milk supply not WNL   Milk supply not WNL Reduced (potential)   Problems Identified Other HX of low milk supply and previous child not latching well. giving large amounts of supplementation   Maternal history   Maternal history Caesarean section;Obesity;PIH;Anxiety   Maternal Assessment   Bilateral Breasts Wide spaced;Soft;Symmetrical   Bilateral Nipples Colostrum easily expressed;Everted   Prior breastfeeding experience (comment below) Multip;Unsuccessful   Pain assessment   Location/Comment Denies   Guidelines for use of:   Suggested use of pump Pump if infant is not latching to breast;Pump each time a supplement is offered;Pump 8-12X/24hr   Other (comment) LC assistance offered. Mother states that she has been bringing baby to the breast and infant is more interested. Mother is still pumping and LC enccouraged her to continue to pump if formual is given. LC reviewed s/s of engorgment and mastitis. Outpatient information reviewed and all questions answered.

## 2025-07-07 NOTE — TELEPHONE ENCOUNTER
Assisted with appointment next Saturday. Instructed to monitor pressures and bring log on day of.

## 2025-07-08 NOTE — PAYOR COMM NOTE
--------------  DISCHARGE REVIEW    Payor: [facility]  Subscriber #:  [id number]  Authorization Number: [id number]    Admit date: 25  Admit time:   1:42 PM  Discharge Date: 2025  2:42 PM     Admitting Physician: Sean Lagos DO  Attending Physician:  No att. providers found  Primary Care Physician: Luis Arevalo MD          Discharge Summary Notes        Discharge Summary signed by Sean Lagos DO at 2025 12:55 PM       Author: Sean Lagos DO Specialty: OBSTETRICS & GYNECOLOGY Author Type: Physician    Filed: 2025 12:55 PM Date of Service: 7/3/2025  1:21 AM Status: Signed    : Sean Lagos DO (Physician)           [facility]  part of [facility]    Discharge Summary    Karen Espinosa Patient Status:  Inpatient    1985 MRN [id number]   Location [facility] B[facility]TH [facility] Attending Sean Lagos DO   Hosp Day # 4       Delivering OB Clinician: Dr. Lagos    Admission date: 25  Discharge Date: 25    EDC: Estimated Date of Delivery: 7/15/25    Gestational Age: 38w2d    Antepartum complications:   Patient Active Problem List   Diagnosis    BMI 40.6    Frequent UTI    Rh negative state in antepartum period ([facility])    Multigravida of advanced maternal age in first trimester ([facility])    History of  delivery    History of pre-eclampsia    Family history of Downs syndrome    Maternal morbid obesity in second trimester, antepartum ([facility]) [O99.212, E66.01]    Death of family member    Positive GBS test    Breech presentation ([facility])    Pregnancy ([facility])    Gestational hypertension, third trimester ([facility])       Date of Delivery: 7/3/2025 Time of Delivery: 12:11 AM    Delivery Type: spontaneous vaginal delivery    Baby: Liveborn male   Information for the patient's :  Dax Espinosa [[id number]]   7 lb 9.7 oz (3.45 kg)  Apgars:  1 minute: 8  5 minutes: 910 minutes:       Intrapartum  Complications: gHTN    Hospital Course: 40 yo  @ 38w2d presents for scheduled NST and found to have gHTN.  Underwent RCS.  Extensive adhesions from uterus and omentum to abdominal wall. POD3 BPs starting to elevate so Nifedipine 30mg XL daily started    Discharge Physical Exam:   /81 (BP Location: Right arm)   Pulse 82   Temp 97.9 °F (36.6 °C) (Oral)   Resp 16   Wt 253 lb 4.9 oz (114.9 kg)   LMP 10/08/2024 (Exact Date)   SpO2 97%   Breastfeeding Yes   BMI 46.33 kg/m²   General appearance:  alert, appears stated age, cooperative and no distress  Abdominal: soft, non-tender, no rebound  Uterus: firm, nontender, below umbilicus  Incision: clean, dry and intact with staples  Pelvic: deferred  Extremities: Homans sign is negative, no sign of DVT    Discharged Condition: stable    Disposition: home      Plan:     Follow-up appointment in 6 weeks with Dr. Lagos              Electronically signed by Sean Lagos DO on 2025 12:55 PM         REVIEWER COMMENTS

## 2025-07-12 ENCOUNTER — NURSE ONLY (OUTPATIENT)
Dept: OBGYN CLINIC | Facility: CLINIC | Age: 40
End: 2025-07-12

## 2025-07-12 VITALS
BODY MASS INDEX: 43 KG/M2 | SYSTOLIC BLOOD PRESSURE: 135 MMHG | HEART RATE: 76 BPM | DIASTOLIC BLOOD PRESSURE: 84 MMHG | WEIGHT: 235 LBS

## 2025-07-12 DIAGNOSIS — Z01.30 BP CHECK: Primary | ICD-10-CM

## 2025-07-12 DIAGNOSIS — Z48.02 REMOVAL OF STAPLES: ICD-10-CM

## 2025-07-12 PROCEDURE — 99211 OFF/OP EST MAY X REQ PHY/QHP: CPT | Performed by: OBSTETRICS & GYNECOLOGY

## 2025-07-12 NOTE — PROGRESS NOTES
Patient is in for BP check and staple removal. I started with patient blood pressure. Patient brought in logs and BP's stayed consistent around 130's over 80's-90's. Patient was given blood pressure medication in the hospital, Nifedipine 30mg XL every 24 hours. She was not on or given Mag Sulfate. Her blood pressure reading in the clinic today was 135/84. Patient stated she is asymptomatic. Delivering doctor was Dr. Lgaos. I spoke with Dr. Deng about patient's BP and doctor stated to let the patient know to continue taking medication and sending in weekly BP logs. For the staple removal, I cleaned the area with sterile water and used the appropriate tools to remove the staples. After that was done, I placed sterile strips on the area and informed patient about appropriate maintenance of the strips. Patient verbalized understanding.

## 2025-07-25 ENCOUNTER — TELEPHONE (OUTPATIENT)
Dept: OBGYN UNIT | Facility: HOSPITAL | Age: 40
End: 2025-07-25

## 2025-07-25 NOTE — PROGRESS NOTES
Unable to reach pt at this time. Voicemail feature is not enabled on phone. Cradle call letter sent via MY CHART.

## 2025-08-18 ENCOUNTER — POSTPARTUM (OUTPATIENT)
Dept: OBGYN CLINIC | Facility: CLINIC | Age: 40
End: 2025-08-18

## 2025-08-18 VITALS
SYSTOLIC BLOOD PRESSURE: 130 MMHG | BODY MASS INDEX: 42 KG/M2 | HEART RATE: 73 BPM | DIASTOLIC BLOOD PRESSURE: 89 MMHG | WEIGHT: 229.81 LBS

## 2025-08-18 RX ORDER — ETONOGESTREL AND ETHINYL ESTRADIOL VAGINAL RING .015; .12 MG/D; MG/D
1 RING VAGINAL
Qty: 3 RING | Refills: 1 | Status: SHIPPED
Start: 2025-08-18

## (undated) DEVICE — POWDER HEMSTAT 3GM OXIDIZED REGENERATED CELOS

## (undated) DEVICE — EXTRA LARGE, DISPOSABLE C-SECTION PROTECTOR - RETRACTOR: Brand: ALEXIS ® O C-SECTION PROTECTOR - RETRACTOR

## (undated) DEVICE — KIWI® VACUUM DELIVERY SYSTEM, OMNICUP®: Brand: KIWI® OMNICUP®

## (undated) DEVICE — LIGASURE EXACT DISSECTOR: Brand: LIGASURE

## (undated) DEVICE — PISTOL GRIP SKIN STAPLER: Brand: MULTIFIRE PREMIUM

## (undated) DEVICE — 3M™ MEDIPORE™ H SOFT CLOTH SURGICAL TAPE 2864, 4 INCH X 10 YARD (10CM X 9,14M), 12 ROLLS/CASE: Brand: 3M™ MEDIPORE™

## (undated) NOTE — LETTER
AGREEMENT FOR TREATMENT WITH Rh IMMUNE GLOBULIN      To:    Gail Stapleton MD (provider) and [place], [place]     Date: April 24, 2025   Time: 9:36 AM  I authorize the administration of Rh Immune Globulin for    Karen Espinosa (myself or name of patient) as deemed advisable in the judgment of the attending physician of his associates or assistants.     It is understood and agreed that the attending physician or his associates and assistants shall be responsible only for the performance of their own individual professional acts and that the blood typing and the selection of compatible Rh Immune Globulin are the responsibilities of those who actually perform the tests.   It has been fully explained that immunization with Rh Immune Globulin is not always successful in producing the desired result.           _____________________________________________  (Patient or person with authority to consent for patient)      _____________________________________________  (Relationship to patient)    _____________________________________________  (Witness)

## (undated) NOTE — LETTER
1/3/2019          To Whom It May Concern:    Adleina Denney is currently under my medical care and may not return to work at this time. Please excuse Karen for 3 days. She may return to work on 1/7/19.   If you require additional information please c

## (undated) NOTE — LETTER
[facility]  Administration of Anesthesia  1. James Carrion, or _________________________________ acting on her behalf, (Patient) (Dependent/Representative) request to receive anesthesia for my pending procedure/operation/treatment.   A bleeding, seizure, cardiac arrest and death. 7. AWARENESS: I understand that it is possible (but unlikely) to have explicit memory of events from the operating room while under general anesthesia.   8. ELECTROCONVULSIVE THERAPY PATIENTS: This consent serve below affirms that prior to the time of the procedure, I have explained to the patient and/or his/her guardian, the risks and benefits of undergoing anesthesia, as well as any reasonable alternatives.     ___________________________________________________

## (undated) NOTE — LETTER
1/13/2020          To Whom It May Concern:    Ranjith Ordonez is currently under my medical care and was seen in clinic for an acute medical visit. Please excuse her absence from 1/13/20-1/14/20, she is cleared to return on 1/15/20 with no restrictions.

## (undated) NOTE — LETTER
[facility]  Scheduling the Birth of Your Baby    You are scheduled for a  delivery on  at 11:30am with .  You should arrive at the [facility] at 9:30am.      Please follow the enclosed antimicrobial wash instructions.  This wash should be started 2 days prior to surgery and be continued until the day of surgery, for a total of 3 washes.    There is pre-op testing that has to be done within 2 days before your surgery. These labs must be done within the [facility], not an outside lab. All labs must be scheduled in advance throught  [facility] or by going to [email or web address].    A Nurse from Labor and Delivery  will be calling a few days before your scheduled section to go over instructions regarding an enhanced recovery. If you don't receive a call please call them at [phone].    Unless otherwise instructed by your physician, DO NOT eat or drink anything within 6 hours of your arrival to the [facility].    If you have not preregistered, please mail in your preregistration forms.    The [facility] is located on the 3rd floor of [facility].  Please use the east elevators.    When you arrive, you will be brought to your room and asked to change into a hospital gown.  Your nurse will take your temperature, blood pressure, and pulse and place you on the fetal monitor to monitor the baby's well being and any uterine activity you may be experiencing prior to your delivery.  Your nurse will also ask you some questions, start an intravenous (IV) line, and possibly draw some blood if your lab tests were not completed prior to your arrival.  You will also sign a consent for surgery.    Your partner will be asked to change into scrubs so that he/she can be with you in the operating room for the birth of your child.  There are no cameras or video cameras allowed in the operating room.    You will be interviewed by the  anesthesiologist, support stockings will be applied to your lower legs, and you will be shaved at the incision site.    When surgery is completed, you and your partner will be taken to the recovery room for at least an hour prior to your return to your room.    Please feel free to contact the [facility] with any questions or concerns @ [phone].

## (undated) NOTE — LETTER
If You Are Rh Negative - Non Routine Administration  If you’re Rh negative, ask your health care provider about getting treated with RhoGam or Rhophylac. Even if you miscarry or don’t deliver the baby, you will still need treatment. The health of any baby you have in the future depends on it.       When are you treated?   If your blood has not formed Rh antibodies, you’ll be treated during week 28 of your pregnancy. You also may be treated any time there’s a chance that fetal blood has mixed with yours. For example, this might be after an amniocentesis, a prenatal test; or if you have vaginal bleeding earlier than 28 weeks. Treatment is an injection of a medicine called RhoGam or Rhophylac. RhoGam or Rhophylac prevents Rh antibodies from forming. It won’t harm you or the fetus. After you give birth, your baby’s blood will be tested. If it’s Rh positive, you’ll be given RhoGam or Rhophylac again within 3 days. If it’s Rh negative, you won’t need RhoGam or Rhophylac until your next pregnancy.     Preventing future problems   Your chance of forming Rh antibodies increases with each pregnancy. This is true even for an ectopic pregnancy (the fertilized egg is outside the uterus) and pregnancies that end in miscarriage or . In these cases, you will most likely receive a RhoGam or Rhophylac injection. This is because your body can make Rh antibodies even if you don’t deliver a baby. Rh antibodies can cause problems in future pregnancies such as fetal anemia (low iron in the blood), miscarriage, stillbirth, or a serious illness in the baby that is called hemolytic disease of the .  Fortunately, Rh sensitization is very rare because women who are Rh negative can get a shot that stops their body from making antibodies to Rh positive blood.     If you are experiencing any spotting or bleeding prior to 28 weeks you need to call your doctor’s office for further recommendations. You can expect the following:      Blood test to check for blood type and Rh factor at an [facility]  Ultrasound ([facility] or [facility])  RhoGam or Rhophylac injection same day as directed by your provider    Location, date and time:  3/22/2024 135 pm [facility], 2015-Q2, [employer] Nurse-Mid[employer], 2013, Vol 58, No. 6

## (undated) NOTE — LETTER
If You Are Rh Negative – Routine Administration    If you’re Rh negative, ask your health care provider about getting treated with RhoGam or Rhophylac. Even if you miscarry or don’t deliver the baby, you will still need treatment.  The health of any baby yo directed    Location, date and time: 5/5/21 [facility] [address]

## (undated) NOTE — LETTER
VACCINE ADMINISTRATION RECORD  PARENT / GUARDIAN APPROVAL  Date: 2025  Vaccine administered to: Karen Espinosa     : 1985    MRN: [id number]    A copy of the appropriate Centers for Disease Control and Prevention Vaccine Information statement has been provided. I have read or have had explained the information about the diseases and the vaccines listed below. There was an opportunity to ask questions and any questions were answered satisfactorily. I believe that I understand the benefits and risks of the vaccine cited and ask that the vaccine(s) listed below be given to me or to the person named above (for whom I am authorized to make this request).    VACCINES ADMINISTERED:  TDAP    I have read and hereby agree to be bound by the terms of this agreement as stated above. My signature is valid until revoked by me in writing.  This document is signed by SELF, relationship: SELF on 2025.:                                                                                                                                         Parent / Guardian Signature                                                Date    Barb LIVINGSTON CMA served as a witness to authentication that the identity of the person signing electronically is in fact the person represented as signing.    This document was generated by Barb LIVINGSTON CMA on 2025.

## (undated) NOTE — LETTER
2021                     To Whom it may concern,    Lashay Umm DELCID 1985 is under my medical care and cleared to return to work with no restrictions as of 21. Sincerely,        Mercedes Balderas.  [place],   [address] [facility]

## (undated) NOTE — LETTER
VACCINE ADMINISTRATION RECORD  PARENT / GUARDIAN APPROVAL  Date: 2021  Vaccine administered to: Kristyn Tomlinson     : 1985    MRN: [id number]    A copy of the appropriate Centers for Disease Control and Prevention Vaccine Information statement

## (undated) NOTE — LETTER
[facility]  Administration of Anesthesia  IKaren agree to be cared for by a physician anesthesiologist alone and/or with a nurse anesthetist, who is specially trained to monitor me and give me medicine to put me to sleep or keep me comfortable during my procedure    I understand that my anesthesiologist and/or anesthetist is not an employee or agent of [facility] or [employer]. He or she works for [facility]    As the patient asking for anesthesia services, I agree to:  Allow the anesthesiologist (anesthesia doctor) to give me medicine and do additional procedures as necessary. Some examples are: Starting or using an “IV” to give me medicine, fluids or blood during my procedure, and having a breathing tube placed to help me breathe when I’m asleep (intubation). In the event that my heart stops working properly, I understand that my anesthesiologist will make every effort to sustain my life, unless otherwise directed by [facility] Do Not Resuscitate documents.  Tell my anesthesia doctor before my procedure:  If I am pregnant.  The last time that I ate or drank.  iii. All of the medicines I take (including prescriptions, herbal supplements, and pills I can buy without a prescription (including street drugs/illegal medications). Failure to inform my anesthesiologist about these medicines may increase my risk of anesthetic complications.  iv.If I am allergic to anything or have had a reaction to anesthesia before.  I understand how the anesthesia medicine will help me (benefits).  I understand that with any type of anesthesia medicine there are risks:  The most common risks are: nausea, vomiting, sore throat, muscle soreness, damage to my eyes, mouth, or teeth (from breathing tube placement).  Rare risks include: remembering what happened during my procedure, allergic reactions to medications, injury to my airway, heart, lungs, vision, nerves, or  muscles and in extremely rare instances death.  My doctor has explained to me other choices available to me for my care (alternatives).  Pregnant Patients (“epidural”):  I understand that the risks of having an epidural (medicine given into my back to help control pain during labor), include itching, low blood pressure, difficulty urinating, headache or slowing of the baby’s heart. Very rare risks include infection, bleeding, seizure, irregular heart rhythms and nerve injury.  Regional Anesthesia (“spinal”, “epidural”, & “nerve blocks”):  I understand that rare but potential complications include headache, bleeding, infection, seizure, irregular heart rhythms, and nerve injury.    _____________________________________________________________________________  Patient (or Representative) Signature/Relationship to Patient  Date   Time    _____________________________________________________________________________   Name (if used)    Language/Organization   Time    _____________________________________________________________________________  Nurse Anesthetist Signature     Date   Time  _____________________________________________________________________________  Anesthesiologist Signature     Date   Time  I have discussed the procedure and information above with the patient (or patient’s representative) and answered their questions. The patient or their representative has agreed to have anesthesia services.    _____________________________________________________________________________  Witness        Date   Time  I have verified that the signature is that of the patient or patient’s representative, and that it was signed before the procedure  Patient Name: Karen Espinosa     : 1985                 Printed: 2025 at 1:42 PM    Medical Record #: [id number]                                            Page 1 of 1  ----------ANESTHESIA CONSENT----------

## (undated) NOTE — LETTER
INSTRUCTIONS FOR PRE-SURGICAL   ANTIMICROBIAL BATH/SHOWER    Your doctor has recommended a pre-surgical CHG (chlorhexidine gluconate) shower/bath with Betasept (also sold as Hibiclens).  It reduces bacteria that can potentially cause infection.  Betasept is available at [facility] and usually at your local retail pharmacy.    The average adult will use a total of 6 to 10 ounces for three baths.  That is approximately two 4 oz. Bottles.  Depending on individual size, weight and number of treatments, the amount may vary.    IMPORTANT! Read ALL instructions BEFORE starting.     AVOID these areas:  Head: hair, scalp, eyes, ears, nose and mouth  Genital area (inner vaginal and inner rectal)  All open wounds (including surgical incisions)    CONCENTRATE on these areas:  Under arms  Under breast tissue  Between skin folds  Hair bearing areas of groin and between buttocks    DIRECTIONS:  Take your usual shower or bath, rinse  Pour two ounces of Betasept (or Hibiclens) onto moist washcloth  Avoiding head, wash gently (does not foam)  Let sit on skin for three minutes  Rinse completely with water and towel dry    Repeat bath/shower for three consecutive days:  First bath... Two nights before the day of surgery.  Second bath... The night before surgery.  Third bath... The morning of surgery.  Do not apply lotions, deodorants or powder after the last bath.    DISCARD REMAINING PRODUCT AFTER LAST BATH!    DRUG FACTS    Active Ingredient: Chlorhexidine gluconae solution 4%        Warnings:  For external use only.  Do not use:  If you are allergic to chlorhexidine gluconate or any other ingredients listed on the label  As a pre-surgical cleanser of head or face    STOP USE and notify doctor if :  Irritation or allergic reaction occurs  If contact occurs in eyes, ears, mouth, rinse immediately with cold water.    Keep out of reach of children.  If swallowed get medica help or contact Poison Control Center.   Store between 60-80 degrees F.    Fabric Warning!  CHG WILL STAIN YOUR FABRICS!  Use with care around shower curtains, towels washcloths rugs and clothes.  Wipe surfaces immediately if accidentally splashed.      Laundering Instructions:  CHG skin cleanser will cause stains if used with chlorinated products.  Rinse immediately and completely and use only non-chlorine detergents.

## (undated) NOTE — LETTER
Patient Name: Ulysses Perking  : 1985  MRN: [id number]  Patient Address: [facility] [address]  [place] [address]      Coronavirus Disease 2019 (COVID-19)     [facility] is committed to the safety and well-being of our patients, members, em carefully. If your symptoms get worse, call your healthcare provider immediately. 3. Get rest and stay hydrated.    4. If you have a medical appointment, call the healthcare provider ahead of time and tell them that you have or may have COVID-19.  5. For m of fever-reducing medications; and  · Improvement in respiratory symptoms (e.g., cough, shortness of breath); and  · At least 10 days have passed since symptoms first appeared OR if asymptomatic patient or date of symptom onset is unclear then use 10 days donors must:    · Have had a confirmed diagnosis of COVID-19  · Be symptom-free for at least 14 days*    *Some people will be required to have a repeat COVID-19 test in order to be eligible to donate.  If you’re instructed by [facility] that a repeat test is r random. Researchers are trying to identify similarities between people with a Post-COVID condition to better understand if there are risk factors. How do I prevent a Post-COVID condition?   The best way to prevent the long-term symptoms of COVID-19 is

## (undated) NOTE — LETTER
AGREEMENT FOR TREATMENT WITH Rh IMMUNE GLOBULIN    To:    Nurse (provider) and [place], [place]     Date: March 22, 2024   Time: 1:30 PM  I authorize the administration of Rh Immune Globulin for    Karen Espinosa (myself or name of patient) as deemed advisable in the judgment of the attending physician of his associates or assistants.     It is understood and agreed that the attending physician or his associates and assistants shall be responsible only for the performance of their own individual professional acts and that the blood typing and the selection of compatible Rh Immune Globulin are the responsibilities of those who actually perform the tests.   It has been fully explained that immunization with Rh Immune Globulin is not always successful in producing the desired result.             _____________________________________________  (Patient or person with authority to consent for patient)        _____________________________________________  (Relationship to patient)        _____________________________________________  (Witness)

## (undated) NOTE — LETTER
AGREEMENT FOR TREATMENT WITH Rh IMMUNE GLOBULIN          To:    LUIS Saxena (provider) and [facility]     Date: May 5, 2021   Time: 2:38 PM  I authorize the administration of Rh Immune Globulin for    [facility]

## (undated) NOTE — LETTER
If You Are Rh Negative - Routine Administration    If you’re Rh negative, ask your health care provider about getting treated with RhoGam or Rhophylac. Even if you miscarry or don’t deliver the baby, you will still need treatment. The health of any baby you have in the future depends on it.       When are you treated?   If your blood has not formed Rh antibodies, you’ll be treated during week 28 of your pregnancy. You also may be treated any time there’s a chance that fetal blood has mixed with yours. For example, this might be after an amniocentesis, a prenatal test; or if you have vaginal bleeding earlier than 28 weeks. Treatment is an injection of a medicine called RhoGam or Rhophylac. RhoGam or Rhophylac prevents Rh antibodies from forming. It won’t harm you or the fetus. After you give birth, your baby’s blood will be tested. If it’s Rh positive, you’ll be given RhoGam or Rhophylac again within 3 days. If it’s Rh negative, you won’t need RhoGam or Rhophylac until your next pregnancy.     Preventing future problems   Your chance of forming Rh antibodies increases with each pregnancy. This is true even for an ectopic pregnancy (the fertilized egg is outside the uterus) and pregnancies that end in miscarriage or . In these cases, you will most likely receive a RhoGam or Rhophylac injection. This is because your body can make Rh antibodies even if you don’t deliver a baby. Rh antibodies can cause problems in future pregnancies such as fetal anemia (low iron in the blood), miscarriage, stillbirth, or a serious illness in the baby that is called hemolytic disease of the .  Fortunately, Rh sensitization is very rare because women who are Rh negative can get a shot that stops their body from making antibodies to Rh positive blood.     You can expect the following:     Blood test to check for blood type and Rh factor at an [facility]  RhoGam or Rhophylac injection in your provider’s office as  directed      Location, date and time:  [facility], 4/24/2025